# Patient Record
Sex: FEMALE | Race: WHITE | Employment: OTHER | URBAN - METROPOLITAN AREA
[De-identification: names, ages, dates, MRNs, and addresses within clinical notes are randomized per-mention and may not be internally consistent; named-entity substitution may affect disease eponyms.]

---

## 2018-04-06 RX ORDER — LANOLIN ALCOHOL/MO/W.PET/CERES
100 CREAM (GRAM) TOPICAL DAILY
Qty: 30 TABLET | Refills: 5 | Status: SHIPPED | OUTPATIENT
Start: 2018-04-06 | End: 2018-10-08 | Stop reason: SDUPTHER

## 2018-04-06 RX ORDER — BUPROPION HYDROCHLORIDE 150 MG/1
150 TABLET ORAL EVERY MORNING
Qty: 30 TABLET | Refills: 5 | Status: SHIPPED | OUTPATIENT
Start: 2018-04-06 | End: 2018-06-04 | Stop reason: SDUPTHER

## 2018-06-04 ENCOUNTER — OFFICE VISIT (OUTPATIENT)
Dept: FAMILY MEDICINE CLINIC | Age: 58
End: 2018-06-04
Payer: COMMERCIAL

## 2018-06-04 ENCOUNTER — HOSPITAL ENCOUNTER (OUTPATIENT)
Age: 58
Discharge: HOME | End: 2018-06-06
Payer: COMMERCIAL

## 2018-06-04 VITALS
DIASTOLIC BLOOD PRESSURE: 82 MMHG | TEMPERATURE: 98.3 F | OXYGEN SATURATION: 98 % | SYSTOLIC BLOOD PRESSURE: 124 MMHG | WEIGHT: 123 LBS | BODY MASS INDEX: 21.11 KG/M2 | HEART RATE: 98 BPM

## 2018-06-04 DIAGNOSIS — J44.9 CHRONIC OBSTRUCTIVE PULMONARY DISEASE, UNSPECIFIED COPD TYPE (HCC): Primary | ICD-10-CM

## 2018-06-04 DIAGNOSIS — K21.9 GASTROESOPHAGEAL REFLUX DISEASE WITHOUT ESOPHAGITIS: ICD-10-CM

## 2018-06-04 DIAGNOSIS — D75.89 MACROCYTOSIS: ICD-10-CM

## 2018-06-04 DIAGNOSIS — F17.200 TOBACCO DEPENDENCE: ICD-10-CM

## 2018-06-04 DIAGNOSIS — F10.20 ALCOHOLISM (HCC): ICD-10-CM

## 2018-06-04 DIAGNOSIS — D12.6 ADENOMATOUS POLYP OF COLON, UNSPECIFIED PART OF COLON: ICD-10-CM

## 2018-06-04 DIAGNOSIS — F33.0 MILD EPISODE OF RECURRENT MAJOR DEPRESSIVE DISORDER (HCC): ICD-10-CM

## 2018-06-04 LAB
ALBUMIN SERPL-MCNC: 4.8 G/DL (ref 3.5–5.2)
ALP BLD-CCNC: 55 U/L (ref 35–104)
ALT SERPL-CCNC: 44 U/L (ref 0–32)
ANION GAP SERPL CALCULATED.3IONS-SCNC: 18 MMOL/L (ref 7–16)
AST SERPL-CCNC: 54 U/L (ref 0–31)
BASOPHILS ABSOLUTE: 0.05 E9/L (ref 0–0.2)
BASOPHILS RELATIVE PERCENT: 0.8 % (ref 0–2)
BILIRUB SERPL-MCNC: 0.4 MG/DL (ref 0–1.2)
BUN BLDV-MCNC: 6 MG/DL (ref 6–20)
CALCIUM SERPL-MCNC: 9.7 MG/DL (ref 8.6–10.2)
CHLORIDE BLD-SCNC: 98 MMOL/L (ref 98–107)
CO2: 21 MMOL/L (ref 22–29)
CREAT SERPL-MCNC: 0.6 MG/DL (ref 0.5–1)
EOSINOPHILS ABSOLUTE: 0.07 E9/L (ref 0.05–0.5)
EOSINOPHILS RELATIVE PERCENT: 1.2 % (ref 0–6)
FOLATE: 9.5 NG/ML (ref 4.8–24.2)
GFR AFRICAN AMERICAN: >60
GFR NON-AFRICAN AMERICAN: >60 ML/MIN/1.73
GLUCOSE BLD-MCNC: 68 MG/DL (ref 74–109)
HCT VFR BLD CALC: 44.9 % (ref 34–48)
HEMOGLOBIN: 15.3 G/DL (ref 11.5–15.5)
IMMATURE GRANULOCYTES #: 0.01 E9/L
IMMATURE GRANULOCYTES %: 0.2 % (ref 0–5)
LYMPHOCYTES ABSOLUTE: 2.31 E9/L (ref 1.5–4)
LYMPHOCYTES RELATIVE PERCENT: 38.6 % (ref 20–42)
MCH RBC QN AUTO: 34.2 PG (ref 26–35)
MCHC RBC AUTO-ENTMCNC: 34.1 % (ref 32–34.5)
MCV RBC AUTO: 100.2 FL (ref 80–99.9)
MONOCYTES ABSOLUTE: 0.69 E9/L (ref 0.1–0.95)
MONOCYTES RELATIVE PERCENT: 11.5 % (ref 2–12)
NEUTROPHILS ABSOLUTE: 2.86 E9/L (ref 1.8–7.3)
NEUTROPHILS RELATIVE PERCENT: 47.7 % (ref 43–80)
PDW BLD-RTO: 13.6 FL (ref 11.5–15)
PLATELET # BLD: 253 E9/L (ref 130–450)
PMV BLD AUTO: 10 FL (ref 7–12)
POTASSIUM SERPL-SCNC: 4.3 MMOL/L (ref 3.5–5)
RBC # BLD: 4.48 E12/L (ref 3.5–5.5)
SODIUM BLD-SCNC: 137 MMOL/L (ref 132–146)
TOTAL PROTEIN: 7.5 G/DL (ref 6.4–8.3)
VITAMIN B-12: 276 PG/ML (ref 211–946)
WBC # BLD: 6 E9/L (ref 4.5–11.5)

## 2018-06-04 PROCEDURE — G8926 SPIRO NO PERF OR DOC: HCPCS | Performed by: FAMILY MEDICINE

## 2018-06-04 PROCEDURE — 36415 COLL VENOUS BLD VENIPUNCTURE: CPT | Performed by: FAMILY MEDICINE

## 2018-06-04 PROCEDURE — 82746 ASSAY OF FOLIC ACID SERUM: CPT

## 2018-06-04 PROCEDURE — 3014F SCREEN MAMMO DOC REV: CPT | Performed by: FAMILY MEDICINE

## 2018-06-04 PROCEDURE — 3023F SPIROM DOC REV: CPT | Performed by: FAMILY MEDICINE

## 2018-06-04 PROCEDURE — 4004F PT TOBACCO SCREEN RCVD TLK: CPT | Performed by: FAMILY MEDICINE

## 2018-06-04 PROCEDURE — G8420 CALC BMI NORM PARAMETERS: HCPCS | Performed by: FAMILY MEDICINE

## 2018-06-04 PROCEDURE — 3017F COLORECTAL CA SCREEN DOC REV: CPT | Performed by: FAMILY MEDICINE

## 2018-06-04 PROCEDURE — G8427 DOCREV CUR MEDS BY ELIG CLIN: HCPCS | Performed by: FAMILY MEDICINE

## 2018-06-04 PROCEDURE — 80053 COMPREHEN METABOLIC PANEL: CPT

## 2018-06-04 PROCEDURE — 99214 OFFICE O/P EST MOD 30 MIN: CPT | Performed by: FAMILY MEDICINE

## 2018-06-04 PROCEDURE — 85025 COMPLETE CBC W/AUTO DIFF WBC: CPT

## 2018-06-04 PROCEDURE — 82607 VITAMIN B-12: CPT

## 2018-06-04 RX ORDER — OMEPRAZOLE 40 MG/1
40 CAPSULE, DELAYED RELEASE ORAL DAILY
Qty: 30 CAPSULE | Refills: 3 | Status: SHIPPED | OUTPATIENT
Start: 2018-06-04 | End: 2018-10-08 | Stop reason: SDUPTHER

## 2018-06-04 RX ORDER — BUPROPION HYDROCHLORIDE 300 MG/1
300 TABLET ORAL EVERY MORNING
Qty: 30 TABLET | Refills: 3 | Status: SHIPPED | OUTPATIENT
Start: 2018-06-04 | End: 2018-10-08 | Stop reason: SDUPTHER

## 2018-06-06 DIAGNOSIS — I10 ESSENTIAL HYPERTENSION: ICD-10-CM

## 2018-06-06 RX ORDER — LISINOPRIL 10 MG/1
10 TABLET ORAL DAILY
Qty: 30 TABLET | Refills: 5 | Status: SHIPPED | OUTPATIENT
Start: 2018-06-06 | End: 2018-10-19 | Stop reason: SDUPTHER

## 2018-06-29 ENCOUNTER — TELEPHONE (OUTPATIENT)
Dept: FAMILY MEDICINE CLINIC | Age: 58
End: 2018-06-29

## 2018-07-20 ENCOUNTER — OFFICE VISIT (OUTPATIENT)
Dept: FAMILY MEDICINE CLINIC | Age: 58
End: 2018-07-20
Payer: COMMERCIAL

## 2018-07-20 VITALS
WEIGHT: 120 LBS | OXYGEN SATURATION: 96 % | DIASTOLIC BLOOD PRESSURE: 88 MMHG | HEART RATE: 104 BPM | BODY MASS INDEX: 20.49 KG/M2 | TEMPERATURE: 97.6 F | SYSTOLIC BLOOD PRESSURE: 132 MMHG | HEIGHT: 64 IN

## 2018-07-20 DIAGNOSIS — I10 ESSENTIAL HYPERTENSION: ICD-10-CM

## 2018-07-20 DIAGNOSIS — F33.1 MODERATE EPISODE OF RECURRENT MAJOR DEPRESSIVE DISORDER (HCC): Primary | ICD-10-CM

## 2018-07-20 DIAGNOSIS — F10.20 ALCOHOLISM (HCC): ICD-10-CM

## 2018-07-20 DIAGNOSIS — K29.20 CHRONIC ALCOHOLIC GASTRITIS, PRESENCE OF BLEEDING UNSPECIFIED: ICD-10-CM

## 2018-07-20 DIAGNOSIS — Z23 NEED FOR SHINGLES VACCINE: ICD-10-CM

## 2018-07-20 PROCEDURE — 99214 OFFICE O/P EST MOD 30 MIN: CPT | Performed by: FAMILY MEDICINE

## 2018-07-20 PROCEDURE — G8420 CALC BMI NORM PARAMETERS: HCPCS | Performed by: FAMILY MEDICINE

## 2018-07-20 PROCEDURE — G8427 DOCREV CUR MEDS BY ELIG CLIN: HCPCS | Performed by: FAMILY MEDICINE

## 2018-07-20 PROCEDURE — 4004F PT TOBACCO SCREEN RCVD TLK: CPT | Performed by: FAMILY MEDICINE

## 2018-07-20 PROCEDURE — 3017F COLORECTAL CA SCREEN DOC REV: CPT | Performed by: FAMILY MEDICINE

## 2018-07-20 PROCEDURE — 90750 HZV VACC RECOMBINANT IM: CPT | Performed by: FAMILY MEDICINE

## 2018-07-20 PROCEDURE — 90471 IMMUNIZATION ADMIN: CPT | Performed by: FAMILY MEDICINE

## 2018-07-20 PROCEDURE — 3014F SCREEN MAMMO DOC REV: CPT | Performed by: FAMILY MEDICINE

## 2018-07-20 RX ORDER — PAROXETINE 10 MG/1
10 TABLET, FILM COATED ORAL EVERY MORNING
Qty: 30 TABLET | Refills: 3 | Status: SHIPPED | OUTPATIENT
Start: 2018-07-20 | End: 2018-10-19 | Stop reason: SDUPTHER

## 2018-07-20 NOTE — PROGRESS NOTES
[facility]  Office Progress Note - Dr. Yomi Salas  7/20/18    CC:   Chief Complaint   Patient presents with    Discuss Medications        S: Follow-up Depression and gastritis     Depression  We increased her Wellbutrin to 300 mg daily at her last appointment. She tried it for 4 days but it felt too strong, so she went back to the 150mg. She reports that her mood has been fair since going back to the 150mg but still with aily depressive symptoms. Did good with Paxil and Zoloft historically. She has continued to drink alcohol regularly. 8-15 daily probably. Beer. Gastritis  Increased proton pump inhibitor at last appointment. Patient continues chronic alcohol consumption. She reports that her symptoms are improved. \"Actually I really haven't had much problem. \"  No bowel changes,  N, V. Hypertension  Follow-up  Blood pressure is not controlled today. IMproved on recheck though. Took med recently. BP Readings from Last 3 Encounters:   07/20/18 132/88   06/04/18 124/82   03/02/18 (!) 134/94     Patient continues medications regularly. Compliance is good. Denies CP, sob, abd pain, headaches, vision changes, dizziness, hypotensive symptoms. No side effects from medications noted.       Past Medical History:   Diagnosis Date    Abdominal pain 8-19-15    for EGD/Colonoscopy       Family History   Problem Relation Age of Onset    Cancer Mother     High Blood Pressure Father     Arthritis Father     Cancer Father        Past Surgical History:   Procedure Laterality Date    APPENDECTOMY  1995    COLONOSCOPY  8/19/15    ENDOSCOPY, COLON, DIAGNOSTIC  8/19/15       Social History   Substance Use Topics    Smoking status: Current Every Day Smoker     Packs/day: 0.75     Years: 45.00     Types: Cigarettes    Smokeless tobacco: Never Used    Alcohol use 42.0 oz/week     70 Cans of beer per week      Comment: 8-12 cans daily       ROS:  No CP, No palpitations,   No sob, No cough,   No abd pain, No heartburn,   No headaches,   No tingling, No numbness, No weakness,   No bowel changes, No hematochezia, No melena,  No bladder changes, No hematuria  No skin rashes, No skin lesions. No vision changes, No hearing changes,   No polyuria, polydipsia, polyphagia. Depressed mood. ROS otherwise negative unless as listed in HPI. Chart reviewed and updated where appropriate for PMH, Fam, and Soc Hx. Physical Exam   /88   Pulse 104   Temp 97.6 °F (36.4 °C) (Oral)   Ht 5' 4\" (1.626 m)   Wt 120 lb (54.4 kg)   SpO2 96%   BMI 20.60 kg/m²   Wt Readings from Last 3 Encounters:   07/20/18 120 lb (54.4 kg)   06/04/18 123 lb (55.8 kg)   03/02/18 122 lb (55.3 kg)       Constitutional:    She is oriented to person, place, and time. She appears well-developed and well-nourished. HENT:    Nose: Nose normal.    Mouth/Throat: Oropharynx is clear and moist.   Eyes:    Conjunctivae are normal.    Pupils are equal, round, and reactive to light. EOMI. Neck:    Normal range of motion. No thyromegaly or nodules noted. No bruit. Cardiovascular:    Normal rate, regular rhythm and normal heart sounds. No murmur. No gallop and no friction rub. Pulmonary/Chest:    Effort normal and breath sounds normal.    No wheezes. No rales or rhonchi. Abdominal:    Soft. Thin. Bowel sounds are normal.    No distension. No tenderness. Musculoskeletal:    Normal range of motion. No joint swelling noted. No peripheral edema. Skin:    Skin is warm and dry. No rashes, lesions. Psychiatric:    She has a Depressed mood and normal to tearful affect. Normal groom and dress.     Current Outpatient Prescriptions on File Prior to Visit   Medication Sig Dispense Refill    lisinopril (PRINIVIL;ZESTRIL) 10 MG tablet Take 1 tablet by mouth daily 30 tablet 5    omeprazole (PRILOSEC) 40 MG delayed release capsule Take 1 capsule by mouth Daily 30 capsule 3    buPROPion (WELLBUTRIN XL) 300 MG may persist.

## 2018-07-21 PROBLEM — I10 ESSENTIAL HYPERTENSION: Status: ACTIVE | Noted: 2018-07-21

## 2018-07-26 ENCOUNTER — OFFICE VISIT (OUTPATIENT)
Dept: FAMILY MEDICINE CLINIC | Age: 58
End: 2018-07-26
Payer: COMMERCIAL

## 2018-07-26 VITALS
DIASTOLIC BLOOD PRESSURE: 71 MMHG | HEART RATE: 96 BPM | HEIGHT: 64 IN | OXYGEN SATURATION: 98 % | BODY MASS INDEX: 21.51 KG/M2 | SYSTOLIC BLOOD PRESSURE: 125 MMHG | TEMPERATURE: 98.5 F | WEIGHT: 126 LBS | RESPIRATION RATE: 12 BRPM

## 2018-07-26 DIAGNOSIS — H61.22 HEARING LOSS OF LEFT EAR DUE TO CERUMEN IMPACTION: Primary | ICD-10-CM

## 2018-07-26 PROCEDURE — 3014F SCREEN MAMMO DOC REV: CPT | Performed by: PHYSICIAN ASSISTANT

## 2018-07-26 PROCEDURE — G8420 CALC BMI NORM PARAMETERS: HCPCS | Performed by: PHYSICIAN ASSISTANT

## 2018-07-26 PROCEDURE — 4004F PT TOBACCO SCREEN RCVD TLK: CPT | Performed by: PHYSICIAN ASSISTANT

## 2018-07-26 PROCEDURE — 99213 OFFICE O/P EST LOW 20 MIN: CPT | Performed by: PHYSICIAN ASSISTANT

## 2018-07-26 PROCEDURE — 3017F COLORECTAL CA SCREEN DOC REV: CPT | Performed by: PHYSICIAN ASSISTANT

## 2018-07-26 PROCEDURE — G8427 DOCREV CUR MEDS BY ELIG CLIN: HCPCS | Performed by: PHYSICIAN ASSISTANT

## 2018-07-26 NOTE — PROGRESS NOTES
External Canals: Left TM not visible d/t impacted cerumen. Ear canal without swelling or exudate. Right TM translucent. Nose:  No congestion of the nasal mucosa. Throat:  Posterior pharynx without injection, exudate, or tonsillar hypertrophy. Airway patient. Neck:  Normal ROM. Supple. No adenopathy. Respiratory:  CTAB without wheezing, rales, or rhonchi  CV: Regular rate and rhythm, normal heart sounds, without pathological murmurs, ectopy, gallops, or rubs. Skin:  Moist and warm without rashes or lesions. Lymphatic: No lymphangitis or adenopathy noted. Neurological:  Oriented. Motor functions intact. Lab / Imaging Results   (All laboratory and radiology results have been personally reviewed by myself)  Labs:  No results found for this visit on 07/26/18. Assessment / Plan     Impression(s):  1. Hearing loss of left ear due to cerumen impaction      Disposition:  Disposition: Discharge to home. Ear lavage performed in office, symptoms resolved post-lavage. Left TM translucent without erythema or perforation. Avoid Q-tip use to prevent recurrence. F/u PCP in 5-7 days if symptoms recur. ED sooner if symptoms worsen or change. ED immediately with fever, severe or worsening ear pain, CP, dyspnea, or dysphagia. Pt is in agreement with this care plan. All questions answered.

## 2018-10-02 ENCOUNTER — OFFICE VISIT (OUTPATIENT)
Dept: FAMILY MEDICINE CLINIC | Age: 58
End: 2018-10-02
Payer: COMMERCIAL

## 2018-10-02 VITALS
BODY MASS INDEX: 22.14 KG/M2 | WEIGHT: 129 LBS | SYSTOLIC BLOOD PRESSURE: 128 MMHG | DIASTOLIC BLOOD PRESSURE: 68 MMHG | TEMPERATURE: 97.8 F | OXYGEN SATURATION: 92 % | HEART RATE: 88 BPM

## 2018-10-02 DIAGNOSIS — F33.1 MODERATE EPISODE OF RECURRENT MAJOR DEPRESSIVE DISORDER (HCC): ICD-10-CM

## 2018-10-02 DIAGNOSIS — I10 ESSENTIAL HYPERTENSION: ICD-10-CM

## 2018-10-02 DIAGNOSIS — F10.20 ALCOHOLISM (HCC): Primary | ICD-10-CM

## 2018-10-02 PROCEDURE — 4004F PT TOBACCO SCREEN RCVD TLK: CPT | Performed by: FAMILY MEDICINE

## 2018-10-02 PROCEDURE — 3014F SCREEN MAMMO DOC REV: CPT | Performed by: FAMILY MEDICINE

## 2018-10-02 PROCEDURE — G8420 CALC BMI NORM PARAMETERS: HCPCS | Performed by: FAMILY MEDICINE

## 2018-10-02 PROCEDURE — G8427 DOCREV CUR MEDS BY ELIG CLIN: HCPCS | Performed by: FAMILY MEDICINE

## 2018-10-02 PROCEDURE — G8484 FLU IMMUNIZE NO ADMIN: HCPCS | Performed by: FAMILY MEDICINE

## 2018-10-02 PROCEDURE — 99214 OFFICE O/P EST MOD 30 MIN: CPT | Performed by: FAMILY MEDICINE

## 2018-10-02 PROCEDURE — 3017F COLORECTAL CA SCREEN DOC REV: CPT | Performed by: FAMILY MEDICINE

## 2018-10-08 ENCOUNTER — TELEPHONE (OUTPATIENT)
Dept: FAMILY MEDICINE CLINIC | Age: 58
End: 2018-10-08

## 2018-10-08 DIAGNOSIS — F33.0 MILD EPISODE OF RECURRENT MAJOR DEPRESSIVE DISORDER (HCC): ICD-10-CM

## 2018-10-08 DIAGNOSIS — K21.9 GASTROESOPHAGEAL REFLUX DISEASE WITHOUT ESOPHAGITIS: ICD-10-CM

## 2018-10-08 RX ORDER — OMEPRAZOLE 40 MG/1
40 CAPSULE, DELAYED RELEASE ORAL DAILY
Qty: 30 CAPSULE | Refills: 3 | Status: SHIPPED | OUTPATIENT
Start: 2018-10-08 | End: 2018-10-19 | Stop reason: SDUPTHER

## 2018-10-08 RX ORDER — LANOLIN ALCOHOL/MO/W.PET/CERES
100 CREAM (GRAM) TOPICAL DAILY
Qty: 30 TABLET | Refills: 5 | Status: SHIPPED | OUTPATIENT
Start: 2018-10-08 | End: 2018-10-19 | Stop reason: SDUPTHER

## 2018-10-08 RX ORDER — BUPROPION HYDROCHLORIDE 300 MG/1
300 TABLET ORAL EVERY MORNING
Qty: 30 TABLET | Refills: 3 | Status: SHIPPED | OUTPATIENT
Start: 2018-10-08 | End: 2018-10-19 | Stop reason: SDUPTHER

## 2018-10-08 NOTE — TELEPHONE ENCOUNTER
----- Message from Dionte Carver MD sent at 10/6/2018 12:45 PM EDT -----  Please check with patient and see if she decided to schedule an appointment with a counseling center. If she did not, offer for us to do that for her.

## 2018-10-08 NOTE — PROGRESS NOTES
Left message for pt- has she scheduled with counseling center? Or if she needs number or help scheduling she is to call us back.

## 2018-10-10 NOTE — TELEPHONE ENCOUNTER
Patient called back and left a message, returned her call and got her VM left another message asking for a return call to the office.

## 2018-10-19 ENCOUNTER — TELEPHONE (OUTPATIENT)
Dept: FAMILY MEDICINE CLINIC | Age: 58
End: 2018-10-19

## 2018-10-19 DIAGNOSIS — F33.0 MILD EPISODE OF RECURRENT MAJOR DEPRESSIVE DISORDER (HCC): ICD-10-CM

## 2018-10-19 DIAGNOSIS — K21.9 GASTROESOPHAGEAL REFLUX DISEASE WITHOUT ESOPHAGITIS: ICD-10-CM

## 2018-10-19 DIAGNOSIS — I10 ESSENTIAL HYPERTENSION: ICD-10-CM

## 2018-10-19 DIAGNOSIS — F33.1 MODERATE EPISODE OF RECURRENT MAJOR DEPRESSIVE DISORDER (HCC): ICD-10-CM

## 2018-10-19 NOTE — TELEPHONE ENCOUNTER
Per doctors request, left voicemail for patient to call back at her earliest convenience, notified her that we received a few prescription refill request forms from [address] [place] [address] via fax, and that doctor wanted to know if patient initiated switching pharmacies.     (Forms in [email or web address].)

## 2018-10-22 RX ORDER — LISINOPRIL 10 MG/1
10 TABLET ORAL DAILY
Qty: 90 TABLET | Refills: 0 | Status: SHIPPED | OUTPATIENT
Start: 2018-10-22 | End: 2019-01-29 | Stop reason: SDUPTHER

## 2018-10-22 RX ORDER — LANOLIN ALCOHOL/MO/W.PET/CERES
100 CREAM (GRAM) TOPICAL DAILY
Qty: 90 TABLET | Refills: 0 | Status: SHIPPED | OUTPATIENT
Start: 2018-10-22 | End: 2019-05-30 | Stop reason: SDUPTHER

## 2018-10-22 RX ORDER — OMEPRAZOLE 40 MG/1
40 CAPSULE, DELAYED RELEASE ORAL DAILY
Qty: 90 CAPSULE | Refills: 0 | Status: SHIPPED | OUTPATIENT
Start: 2018-10-22 | End: 2019-04-02 | Stop reason: SDUPTHER

## 2018-10-22 RX ORDER — BUPROPION HYDROCHLORIDE 150 MG/1
150 TABLET ORAL EVERY MORNING
Qty: 90 TABLET | Refills: 0 | Status: SHIPPED | OUTPATIENT
Start: 2018-10-22 | End: 2019-01-29 | Stop reason: SDUPTHER

## 2018-10-22 RX ORDER — ALBUTEROL SULFATE 90 UG/1
2 AEROSOL, METERED RESPIRATORY (INHALATION) EVERY 6 HOURS PRN
Qty: 3 INHALER | Refills: 0 | Status: SHIPPED | OUTPATIENT
Start: 2018-10-22 | End: 2019-04-02 | Stop reason: SDUPTHER

## 2018-10-22 RX ORDER — PAROXETINE 10 MG/1
10 TABLET, FILM COATED ORAL EVERY MORNING
Qty: 90 TABLET | Refills: 0 | Status: SHIPPED | OUTPATIENT
Start: 2018-10-22 | End: 2018-12-26 | Stop reason: SDUPTHER

## 2018-11-27 ENCOUNTER — TELEPHONE (OUTPATIENT)
Dept: FAMILY MEDICINE CLINIC | Age: 58
End: 2018-11-27

## 2018-12-26 DIAGNOSIS — F33.1 MODERATE EPISODE OF RECURRENT MAJOR DEPRESSIVE DISORDER (HCC): ICD-10-CM

## 2018-12-27 RX ORDER — PAROXETINE 10 MG/1
10 TABLET, FILM COATED ORAL EVERY MORNING
Qty: 90 TABLET | Refills: 0 | Status: SHIPPED | OUTPATIENT
Start: 2018-12-27 | End: 2019-04-02 | Stop reason: DRUGHIGH

## 2019-01-02 ENCOUNTER — OFFICE VISIT (OUTPATIENT)
Dept: FAMILY MEDICINE CLINIC | Age: 59
End: 2019-01-02
Payer: COMMERCIAL

## 2019-01-02 ENCOUNTER — HOSPITAL ENCOUNTER (OUTPATIENT)
Age: 59
Discharge: HOME | End: 2019-01-04
Payer: COMMERCIAL

## 2019-01-02 VITALS
RESPIRATION RATE: 14 BRPM | DIASTOLIC BLOOD PRESSURE: 70 MMHG | HEART RATE: 105 BPM | SYSTOLIC BLOOD PRESSURE: 130 MMHG | TEMPERATURE: 98.4 F | OXYGEN SATURATION: 95 % | WEIGHT: 129 LBS | BODY MASS INDEX: 22.86 KG/M2 | HEIGHT: 63 IN

## 2019-01-02 DIAGNOSIS — J44.9 CHRONIC OBSTRUCTIVE PULMONARY DISEASE, UNSPECIFIED COPD TYPE (HCC): ICD-10-CM

## 2019-01-02 DIAGNOSIS — R14.0 BLOATING: Primary | ICD-10-CM

## 2019-01-02 DIAGNOSIS — F10.20 ALCOHOLISM (HCC): ICD-10-CM

## 2019-01-02 DIAGNOSIS — D36.9 TUBULAR ADENOMA: ICD-10-CM

## 2019-01-02 LAB
ALBUMIN SERPL-MCNC: 4.9 G/DL (ref 3.5–5.2)
ALP BLD-CCNC: 64 U/L (ref 35–104)
ALT SERPL-CCNC: 44 U/L (ref 0–32)
ANION GAP SERPL CALCULATED.3IONS-SCNC: 19 MMOL/L (ref 7–16)
AST SERPL-CCNC: 65 U/L (ref 0–31)
BASOPHILS ABSOLUTE: 0.05 E9/L (ref 0–0.2)
BASOPHILS RELATIVE PERCENT: 0.7 % (ref 0–2)
BILIRUB SERPL-MCNC: 0.3 MG/DL (ref 0–1.2)
BUN BLDV-MCNC: 8 MG/DL (ref 6–20)
CALCIUM SERPL-MCNC: 10.1 MG/DL (ref 8.6–10.2)
CHLORIDE BLD-SCNC: 95 MMOL/L (ref 98–107)
CO2: 20 MMOL/L (ref 22–29)
CREAT SERPL-MCNC: 0.6 MG/DL (ref 0.5–1)
EOSINOPHILS ABSOLUTE: 0.14 E9/L (ref 0.05–0.5)
EOSINOPHILS RELATIVE PERCENT: 1.9 % (ref 0–6)
FOLATE: 8.8 NG/ML (ref 4.8–24.2)
GFR AFRICAN AMERICAN: >60
GFR NON-AFRICAN AMERICAN: >60 ML/MIN/1.73
GLUCOSE BLD-MCNC: 66 MG/DL (ref 74–99)
HCT VFR BLD CALC: 46.7 % (ref 34–48)
HEMOGLOBIN: 16 G/DL (ref 11.5–15.5)
IMMATURE GRANULOCYTES #: 0.02 E9/L
IMMATURE GRANULOCYTES %: 0.3 % (ref 0–5)
LYMPHOCYTES ABSOLUTE: 2.4 E9/L (ref 1.5–4)
LYMPHOCYTES RELATIVE PERCENT: 32.9 % (ref 20–42)
MCH RBC QN AUTO: 34.2 PG (ref 26–35)
MCHC RBC AUTO-ENTMCNC: 34.3 % (ref 32–34.5)
MCV RBC AUTO: 99.8 FL (ref 80–99.9)
MONOCYTES ABSOLUTE: 0.61 E9/L (ref 0.1–0.95)
MONOCYTES RELATIVE PERCENT: 8.4 % (ref 2–12)
NEUTROPHILS ABSOLUTE: 4.08 E9/L (ref 1.8–7.3)
NEUTROPHILS RELATIVE PERCENT: 55.8 % (ref 43–80)
PDW BLD-RTO: 13 FL (ref 11.5–15)
PLATELET # BLD: 209 E9/L (ref 130–450)
PMV BLD AUTO: 11.7 FL (ref 7–12)
POTASSIUM SERPL-SCNC: 4.7 MMOL/L (ref 3.5–5)
RBC # BLD: 4.68 E12/L (ref 3.5–5.5)
SODIUM BLD-SCNC: 134 MMOL/L (ref 132–146)
TOTAL PROTEIN: 8.3 G/DL (ref 6.4–8.3)
VITAMIN B-12: 297 PG/ML (ref 211–946)
WBC # BLD: 7.3 E9/L (ref 4.5–11.5)

## 2019-01-02 PROCEDURE — 36415 COLL VENOUS BLD VENIPUNCTURE: CPT | Performed by: FAMILY MEDICINE

## 2019-01-02 PROCEDURE — 3014F SCREEN MAMMO DOC REV: CPT | Performed by: FAMILY MEDICINE

## 2019-01-02 PROCEDURE — 99214 OFFICE O/P EST MOD 30 MIN: CPT | Performed by: FAMILY MEDICINE

## 2019-01-02 PROCEDURE — G8420 CALC BMI NORM PARAMETERS: HCPCS | Performed by: FAMILY MEDICINE

## 2019-01-02 PROCEDURE — 3023F SPIROM DOC REV: CPT | Performed by: FAMILY MEDICINE

## 2019-01-02 PROCEDURE — 85025 COMPLETE CBC W/AUTO DIFF WBC: CPT

## 2019-01-02 PROCEDURE — 82746 ASSAY OF FOLIC ACID SERUM: CPT

## 2019-01-02 PROCEDURE — 3017F COLORECTAL CA SCREEN DOC REV: CPT | Performed by: FAMILY MEDICINE

## 2019-01-02 PROCEDURE — G8427 DOCREV CUR MEDS BY ELIG CLIN: HCPCS | Performed by: FAMILY MEDICINE

## 2019-01-02 PROCEDURE — G8482 FLU IMMUNIZE ORDER/ADMIN: HCPCS | Performed by: FAMILY MEDICINE

## 2019-01-02 PROCEDURE — 82607 VITAMIN B-12: CPT

## 2019-01-02 PROCEDURE — 80053 COMPREHEN METABOLIC PANEL: CPT

## 2019-01-02 PROCEDURE — G8926 SPIRO NO PERF OR DOC: HCPCS | Performed by: FAMILY MEDICINE

## 2019-01-02 PROCEDURE — 4004F PT TOBACCO SCREEN RCVD TLK: CPT | Performed by: FAMILY MEDICINE

## 2019-01-03 ENCOUNTER — TELEPHONE (OUTPATIENT)
Dept: FAMILY MEDICINE CLINIC | Age: 59
End: 2019-01-03

## 2019-01-03 PROCEDURE — 90686 IIV4 VACC NO PRSV 0.5 ML IM: CPT | Performed by: FAMILY MEDICINE

## 2019-01-03 PROCEDURE — 90471 IMMUNIZATION ADMIN: CPT | Performed by: FAMILY MEDICINE

## 2019-01-10 ENCOUNTER — TELEPHONE (OUTPATIENT)
Dept: FAMILY MEDICINE CLINIC | Age: 59
End: 2019-01-10

## 2019-01-10 ENCOUNTER — TELEPHONE (OUTPATIENT)
Dept: SURGERY | Age: 59
End: 2019-01-10

## 2019-01-17 ENCOUNTER — OFFICE VISIT (OUTPATIENT)
Dept: SURGERY | Age: 59
End: 2019-01-17
Payer: COMMERCIAL

## 2019-01-17 ENCOUNTER — PREP FOR PROCEDURE (OUTPATIENT)
Dept: SURGERY | Age: 59
End: 2019-01-17

## 2019-01-17 VITALS
OXYGEN SATURATION: 97 % | HEART RATE: 98 BPM | DIASTOLIC BLOOD PRESSURE: 93 MMHG | SYSTOLIC BLOOD PRESSURE: 137 MMHG | RESPIRATION RATE: 16 BRPM | BODY MASS INDEX: 23.92 KG/M2 | TEMPERATURE: 98 F | HEIGHT: 63 IN | WEIGHT: 135 LBS

## 2019-01-17 DIAGNOSIS — D12.6 ADENOMATOUS POLYP OF COLON, UNSPECIFIED PART OF COLON: ICD-10-CM

## 2019-01-17 DIAGNOSIS — R10.13 EPIGASTRIC PAIN: Primary | ICD-10-CM

## 2019-01-17 PROCEDURE — G8420 CALC BMI NORM PARAMETERS: HCPCS | Performed by: SURGERY

## 2019-01-17 PROCEDURE — 99243 OFF/OP CNSLTJ NEW/EST LOW 30: CPT | Performed by: SURGERY

## 2019-01-17 PROCEDURE — G8482 FLU IMMUNIZE ORDER/ADMIN: HCPCS | Performed by: SURGERY

## 2019-01-17 PROCEDURE — 3017F COLORECTAL CA SCREEN DOC REV: CPT | Performed by: SURGERY

## 2019-01-17 PROCEDURE — 3014F SCREEN MAMMO DOC REV: CPT | Performed by: SURGERY

## 2019-01-17 PROCEDURE — G8427 DOCREV CUR MEDS BY ELIG CLIN: HCPCS | Performed by: SURGERY

## 2019-01-17 RX ORDER — 0.9 % SODIUM CHLORIDE 0.9 %
10 VIAL (ML) INJECTION EVERY 12 HOURS SCHEDULED
Status: CANCELLED | OUTPATIENT
Start: 2019-01-17

## 2019-01-17 RX ORDER — 0.9 % SODIUM CHLORIDE 0.9 %
10 VIAL (ML) INJECTION PRN
Status: CANCELLED | OUTPATIENT
Start: 2019-01-17

## 2019-01-29 DIAGNOSIS — F33.0 MILD EPISODE OF RECURRENT MAJOR DEPRESSIVE DISORDER (HCC): ICD-10-CM

## 2019-01-29 DIAGNOSIS — I10 ESSENTIAL HYPERTENSION: ICD-10-CM

## 2019-01-29 RX ORDER — LISINOPRIL 10 MG/1
10 TABLET ORAL DAILY
Qty: 90 TABLET | Refills: 3 | Status: SHIPPED | OUTPATIENT
Start: 2019-01-29 | End: 2020-01-21

## 2019-01-29 RX ORDER — BUPROPION HYDROCHLORIDE 150 MG/1
150 TABLET ORAL EVERY MORNING
Qty: 90 TABLET | Refills: 3 | Status: SHIPPED | OUTPATIENT
Start: 2019-01-29 | End: 2020-01-02

## 2019-04-02 ENCOUNTER — OFFICE VISIT (OUTPATIENT)
Dept: FAMILY MEDICINE CLINIC | Age: 59
End: 2019-04-02
Payer: COMMERCIAL

## 2019-04-02 VITALS
WEIGHT: 129 LBS | BODY MASS INDEX: 22.02 KG/M2 | HEIGHT: 64 IN | TEMPERATURE: 97.8 F | OXYGEN SATURATION: 99 % | SYSTOLIC BLOOD PRESSURE: 100 MMHG | DIASTOLIC BLOOD PRESSURE: 70 MMHG | HEART RATE: 52 BPM

## 2019-04-02 DIAGNOSIS — F33.1 MODERATE EPISODE OF RECURRENT MAJOR DEPRESSIVE DISORDER (HCC): ICD-10-CM

## 2019-04-02 DIAGNOSIS — K21.9 GASTROESOPHAGEAL REFLUX DISEASE WITHOUT ESOPHAGITIS: ICD-10-CM

## 2019-04-02 DIAGNOSIS — F10.20 ALCOHOLISM (HCC): Primary | ICD-10-CM

## 2019-04-02 PROCEDURE — 3017F COLORECTAL CA SCREEN DOC REV: CPT | Performed by: FAMILY MEDICINE

## 2019-04-02 PROCEDURE — G8420 CALC BMI NORM PARAMETERS: HCPCS | Performed by: FAMILY MEDICINE

## 2019-04-02 PROCEDURE — G8427 DOCREV CUR MEDS BY ELIG CLIN: HCPCS | Performed by: FAMILY MEDICINE

## 2019-04-02 PROCEDURE — 3014F SCREEN MAMMO DOC REV: CPT | Performed by: FAMILY MEDICINE

## 2019-04-02 PROCEDURE — 4004F PT TOBACCO SCREEN RCVD TLK: CPT | Performed by: FAMILY MEDICINE

## 2019-04-02 PROCEDURE — 99214 OFFICE O/P EST MOD 30 MIN: CPT | Performed by: FAMILY MEDICINE

## 2019-04-02 RX ORDER — OMEPRAZOLE 20 MG/1
20 CAPSULE, DELAYED RELEASE ORAL DAILY
Qty: 90 CAPSULE | Refills: 0 | Status: SHIPPED | OUTPATIENT
Start: 2019-04-02 | End: 2019-07-05 | Stop reason: SDUPTHER

## 2019-04-02 RX ORDER — UMECLIDINIUM 62.5 UG/1
AEROSOL, POWDER ORAL
Qty: 90 EACH | Refills: 3 | Status: SHIPPED
Start: 2019-04-02 | End: 2020-02-14

## 2019-04-02 RX ORDER — PAROXETINE 10 MG/1
5 TABLET, FILM COATED ORAL EVERY MORNING
Qty: 7 TABLET | Refills: 0 | Status: SHIPPED
Start: 2019-04-02 | End: 2019-10-02

## 2019-04-02 RX ORDER — OMEPRAZOLE 40 MG/1
40 CAPSULE, DELAYED RELEASE ORAL DAILY
Qty: 90 CAPSULE | Refills: 10 | OUTPATIENT
Start: 2019-04-02

## 2019-04-02 RX ORDER — ALBUTEROL SULFATE 90 UG/1
2 AEROSOL, METERED RESPIRATORY (INHALATION) EVERY 6 HOURS PRN
Qty: 54 G | Refills: 3 | Status: SHIPPED
Start: 2019-04-02 | End: 2020-04-01 | Stop reason: SDUPTHER

## 2019-04-02 NOTE — PROGRESS NOTES
[facility]  Office Progress Note - Dr. Dominique Pickens  4/2/19    CC:   Chief Complaint   Patient presents with    Gastroesophageal Reflux        S: GERD  stomac has been great lately  She has bene cuttting back on drinking a little bit and been eating better foods like salads and better nutrition instead of fast food. No N/V. No melena or bowel changes. Continues high dose PPI. Mood  She has been feeling pretty decent recently  Interested in getting rid of a medication. Would like to stop the paxil. COntinue wellbutrin. Has bene on it long term. No SI or HI. Strained relationships with children still. Alcoholism. Alcoholism. She is drunk right now. Usually is every day at this point. She remains interested in detox program, but doesn't want to do much beyond the initial detox. l  When asked about how she will maintain sobriety after returning home, she doesn't have that insight right now. Discussed this as a reason to transition to a program first to maintain sobriety and then return home. She is worried about finances while she is gone. Wants to work on putting some money back for this over next couple months and then go to detox program.     Past Medical History:   Diagnosis Date    Abdominal pain 8-19-15    for EGD/Colonoscopy       Family History   Problem Relation Age of Onset    Cancer Mother     High Blood Pressure Father     Arthritis Father     Cancer Father        Past Surgical History:   Procedure Laterality Date    APPENDECTOMY  1995    COLONOSCOPY  8/19/15    ENDOSCOPY, COLON, DIAGNOSTIC  8/19/15       Social History     Tobacco Use    Smoking status: Current Every Day Smoker     Packs/day: 0.75     Years: 45.00     Pack years: 33.75     Types: Cigarettes    Smokeless tobacco: Never Used   Substance Use Topics    Alcohol use:  Yes     Alcohol/week: 42.0 oz     Types: 70 Cans of beer per week     Comment: 8-12 cans daily    Drug use: No       ROS:  No CP, (PRINIVIL;ZESTRIL) 10 MG tablet TAKE 1 TABLET BY MOUTH DAILY 90 tablet 3    thiamine 100 MG tablet Take 1 tablet by mouth daily 90 tablet 0     No current facility-administered medications on file prior to visit. Patient Active Problem List   Diagnosis Code    Epigastric abdominal pain R10.13    Alcoholism ([facility] F10.20    Gastroesophageal reflux disease without esophagitis K21.9    Polyp of colon, adenomatous D12.6    Chronic obstructive pulmonary disease ([facility] J44.9    Depression F32.9    Essential hypertension I10        Assessment / Plan  Kehinde Nava was seen today for gastroesophageal reflux. Diagnoses and all orders for this visit:    Alcoholism [facility])  Not making progress, but contemplative. Does admit to decreasing number of beers daily slightly. Still motivated to go to detox but finances of lost work while she is there remain the major barrier. She is going to save some money for a couple months and see if she is able to go then. Remains motivated. Gastroesophageal reflux disease without esophagitis  - Decrease dose. omeprazole (PRILOSEC) 20 MG delayed release capsule; Take 1 capsule by mouth Daily For upset stomach . Moderate episode of recurrent major depressive disorder ([facility]  -  Wean and stop. PARoxetine (PAXIL) 10 MG tablet; Take 0.5 tablets by mouth every morning For a week and then stop. Continue wellbutrin. Return in about 2 months (around 6/2/2019). Patient counseled to follow up sooner or seek more acute care if symptoms worsening. Electronically signed by Tim Hoang MD on 4/7/2019    This note may have been created using dictation software.  Efforts were made to reduce grammatical or syntax errors, but some may persist.

## 2019-04-30 DIAGNOSIS — F33.1 MODERATE EPISODE OF RECURRENT MAJOR DEPRESSIVE DISORDER (HCC): ICD-10-CM

## 2019-04-30 RX ORDER — PAROXETINE 10 MG/1
TABLET, FILM COATED ORAL
Qty: 4 TABLET | Refills: 10 | OUTPATIENT
Start: 2019-04-30

## 2019-05-15 DIAGNOSIS — F33.1 MODERATE EPISODE OF RECURRENT MAJOR DEPRESSIVE DISORDER (HCC): ICD-10-CM

## 2019-05-15 RX ORDER — PAROXETINE 10 MG/1
TABLET, FILM COATED ORAL
Qty: 4 TABLET | Refills: 0 | OUTPATIENT
Start: 2019-05-15

## 2019-07-05 DIAGNOSIS — K21.9 GASTROESOPHAGEAL REFLUX DISEASE WITHOUT ESOPHAGITIS: ICD-10-CM

## 2019-07-08 RX ORDER — OMEPRAZOLE 20 MG/1
20 CAPSULE, DELAYED RELEASE ORAL DAILY
Qty: 90 CAPSULE | Refills: 3 | Status: SHIPPED
Start: 2019-07-08 | End: 2020-06-18

## 2019-10-02 ENCOUNTER — HOSPITAL ENCOUNTER (OUTPATIENT)
Age: 59
Discharge: HOME | End: 2019-10-04
Payer: COMMERCIAL

## 2019-10-02 ENCOUNTER — OFFICE VISIT (OUTPATIENT)
Dept: FAMILY MEDICINE CLINIC | Age: 59
End: 2019-10-02
Payer: COMMERCIAL

## 2019-10-02 VITALS
HEIGHT: 64 IN | DIASTOLIC BLOOD PRESSURE: 90 MMHG | SYSTOLIC BLOOD PRESSURE: 140 MMHG | WEIGHT: 128 LBS | OXYGEN SATURATION: 95 % | TEMPERATURE: 98 F | HEART RATE: 95 BPM | BODY MASS INDEX: 21.85 KG/M2

## 2019-10-02 DIAGNOSIS — I10 ESSENTIAL HYPERTENSION: Primary | ICD-10-CM

## 2019-10-02 DIAGNOSIS — K21.9 GASTROESOPHAGEAL REFLUX DISEASE WITHOUT ESOPHAGITIS: ICD-10-CM

## 2019-10-02 DIAGNOSIS — I10 ESSENTIAL HYPERTENSION: ICD-10-CM

## 2019-10-02 DIAGNOSIS — Z12.31 ENCOUNTER FOR MAMMOGRAM TO ESTABLISH BASELINE MAMMOGRAM: ICD-10-CM

## 2019-10-02 DIAGNOSIS — F17.210 CIGARETTE NICOTINE DEPENDENCE WITHOUT COMPLICATION: ICD-10-CM

## 2019-10-02 DIAGNOSIS — J44.9 CHRONIC OBSTRUCTIVE PULMONARY DISEASE, UNSPECIFIED COPD TYPE (HCC): ICD-10-CM

## 2019-10-02 DIAGNOSIS — Z12.11 COLON CANCER SCREENING: ICD-10-CM

## 2019-10-02 LAB
ALBUMIN SERPL-MCNC: 4.9 G/DL (ref 3.5–5.2)
ALP BLD-CCNC: 68 U/L (ref 35–104)
ALT SERPL-CCNC: 63 U/L (ref 0–32)
ANION GAP SERPL CALCULATED.3IONS-SCNC: 17 MMOL/L (ref 7–16)
AST SERPL-CCNC: 63 U/L (ref 0–31)
BASOPHILS ABSOLUTE: 0.07 E9/L (ref 0–0.2)
BASOPHILS RELATIVE PERCENT: 1.1 % (ref 0–2)
BILIRUB SERPL-MCNC: 0.4 MG/DL (ref 0–1.2)
BUN BLDV-MCNC: 7 MG/DL (ref 6–20)
CALCIUM SERPL-MCNC: 10.3 MG/DL (ref 8.6–10.2)
CHLORIDE BLD-SCNC: 99 MMOL/L (ref 98–107)
CHOLESTEROL, TOTAL: 232 MG/DL (ref 0–199)
CO2: 22 MMOL/L (ref 22–29)
CREAT SERPL-MCNC: 0.6 MG/DL (ref 0.5–1)
EOSINOPHILS ABSOLUTE: 0.12 E9/L (ref 0.05–0.5)
EOSINOPHILS RELATIVE PERCENT: 1.8 % (ref 0–6)
GFR AFRICAN AMERICAN: >60
GFR NON-AFRICAN AMERICAN: >60 ML/MIN/1.73
GLUCOSE BLD-MCNC: 90 MG/DL (ref 74–99)
HCT VFR BLD CALC: 48 % (ref 34–48)
HDLC SERPL-MCNC: 101 MG/DL
HEMOGLOBIN: 16.8 G/DL (ref 11.5–15.5)
IMMATURE GRANULOCYTES #: 0.03 E9/L
IMMATURE GRANULOCYTES %: 0.5 % (ref 0–5)
LDL CHOLESTEROL CALCULATED: 112 MG/DL (ref 0–99)
LYMPHOCYTES ABSOLUTE: 1.81 E9/L (ref 1.5–4)
LYMPHOCYTES RELATIVE PERCENT: 27.4 % (ref 20–42)
MCH RBC QN AUTO: 34.9 PG (ref 26–35)
MCHC RBC AUTO-ENTMCNC: 35 % (ref 32–34.5)
MCV RBC AUTO: 99.6 FL (ref 80–99.9)
MONOCYTES ABSOLUTE: 0.78 E9/L (ref 0.1–0.95)
MONOCYTES RELATIVE PERCENT: 11.8 % (ref 2–12)
NEUTROPHILS ABSOLUTE: 3.8 E9/L (ref 1.8–7.3)
NEUTROPHILS RELATIVE PERCENT: 57.4 % (ref 43–80)
PDW BLD-RTO: 14.2 FL (ref 11.5–15)
PLATELET # BLD: 274 E9/L (ref 130–450)
PMV BLD AUTO: 10.2 FL (ref 7–12)
POTASSIUM SERPL-SCNC: 4.7 MMOL/L (ref 3.5–5)
RBC # BLD: 4.82 E12/L (ref 3.5–5.5)
SODIUM BLD-SCNC: 138 MMOL/L (ref 132–146)
TOTAL PROTEIN: 8.2 G/DL (ref 6.4–8.3)
TRIGL SERPL-MCNC: 96 MG/DL (ref 0–149)
VLDLC SERPL CALC-MCNC: 19 MG/DL
WBC # BLD: 6.6 E9/L (ref 4.5–11.5)

## 2019-10-02 PROCEDURE — 80053 COMPREHEN METABOLIC PANEL: CPT

## 2019-10-02 PROCEDURE — 4004F PT TOBACCO SCREEN RCVD TLK: CPT | Performed by: FAMILY MEDICINE

## 2019-10-02 PROCEDURE — 99214 OFFICE O/P EST MOD 30 MIN: CPT | Performed by: FAMILY MEDICINE

## 2019-10-02 PROCEDURE — 90686 IIV4 VACC NO PRSV 0.5 ML IM: CPT | Performed by: FAMILY MEDICINE

## 2019-10-02 PROCEDURE — G8926 SPIRO NO PERF OR DOC: HCPCS | Performed by: FAMILY MEDICINE

## 2019-10-02 PROCEDURE — 36415 COLL VENOUS BLD VENIPUNCTURE: CPT

## 2019-10-02 PROCEDURE — 3017F COLORECTAL CA SCREEN DOC REV: CPT | Performed by: FAMILY MEDICINE

## 2019-10-02 PROCEDURE — G8482 FLU IMMUNIZE ORDER/ADMIN: HCPCS | Performed by: FAMILY MEDICINE

## 2019-10-02 PROCEDURE — 3023F SPIROM DOC REV: CPT | Performed by: FAMILY MEDICINE

## 2019-10-02 PROCEDURE — 3014F SCREEN MAMMO DOC REV: CPT | Performed by: FAMILY MEDICINE

## 2019-10-02 PROCEDURE — G8420 CALC BMI NORM PARAMETERS: HCPCS | Performed by: FAMILY MEDICINE

## 2019-10-02 PROCEDURE — G8427 DOCREV CUR MEDS BY ELIG CLIN: HCPCS | Performed by: FAMILY MEDICINE

## 2019-10-02 PROCEDURE — 80061 LIPID PANEL: CPT

## 2019-10-02 PROCEDURE — 90471 IMMUNIZATION ADMIN: CPT | Performed by: FAMILY MEDICINE

## 2019-10-02 PROCEDURE — 85025 COMPLETE CBC W/AUTO DIFF WBC: CPT

## 2019-10-02 RX ORDER — NICOTINE 21 MG/24HR
1 PATCH, TRANSDERMAL 24 HOURS TRANSDERMAL EVERY 24 HOURS
Qty: 30 PATCH | Refills: 1 | Status: SHIPPED | OUTPATIENT
Start: 2019-10-02 | End: 2019-10-31 | Stop reason: SDUPTHER

## 2019-12-06 DIAGNOSIS — Z12.11 COLON CANCER SCREENING: ICD-10-CM

## 2019-12-06 LAB
CONTROL: NORMAL
HEMOCCULT STL QL: NORMAL

## 2019-12-06 PROCEDURE — 82274 ASSAY TEST FOR BLOOD FECAL: CPT | Performed by: FAMILY MEDICINE

## 2020-01-02 ENCOUNTER — OFFICE VISIT (OUTPATIENT)
Dept: FAMILY MEDICINE CLINIC | Age: 60
End: 2020-01-02
Payer: COMMERCIAL

## 2020-01-02 VITALS
HEIGHT: 64 IN | HEART RATE: 111 BPM | DIASTOLIC BLOOD PRESSURE: 90 MMHG | TEMPERATURE: 98.3 F | SYSTOLIC BLOOD PRESSURE: 140 MMHG | OXYGEN SATURATION: 94 % | BODY MASS INDEX: 23.05 KG/M2 | WEIGHT: 135 LBS

## 2020-01-02 PROBLEM — F10.20 ALCOHOL USE DISORDER, MODERATE, DEPENDENCE (HCC): Status: ACTIVE | Noted: 2020-01-02

## 2020-01-02 PROCEDURE — G8482 FLU IMMUNIZE ORDER/ADMIN: HCPCS | Performed by: FAMILY MEDICINE

## 2020-01-02 PROCEDURE — G8427 DOCREV CUR MEDS BY ELIG CLIN: HCPCS | Performed by: FAMILY MEDICINE

## 2020-01-02 PROCEDURE — G8420 CALC BMI NORM PARAMETERS: HCPCS | Performed by: FAMILY MEDICINE

## 2020-01-02 PROCEDURE — 3017F COLORECTAL CA SCREEN DOC REV: CPT | Performed by: FAMILY MEDICINE

## 2020-01-02 PROCEDURE — 3023F SPIROM DOC REV: CPT | Performed by: FAMILY MEDICINE

## 2020-01-02 PROCEDURE — 4004F PT TOBACCO SCREEN RCVD TLK: CPT | Performed by: FAMILY MEDICINE

## 2020-01-02 PROCEDURE — 99214 OFFICE O/P EST MOD 30 MIN: CPT | Performed by: FAMILY MEDICINE

## 2020-01-02 PROCEDURE — G8926 SPIRO NO PERF OR DOC: HCPCS | Performed by: FAMILY MEDICINE

## 2020-01-02 PROCEDURE — G0296 VISIT TO DETERM LDCT ELIG: HCPCS | Performed by: FAMILY MEDICINE

## 2020-01-02 RX ORDER — BUPROPION HYDROCHLORIDE 300 MG/1
300 TABLET ORAL EVERY MORNING
Qty: 30 TABLET | Refills: 2 | Status: SHIPPED
Start: 2020-01-02 | End: 2020-02-12

## 2020-01-02 NOTE — PROGRESS NOTES
[facility]  Office Progress Note - Dr. Arnav Causey  1/2/20    CC:   Chief Complaint   Patient presents with    Hypertension        S:   Hypertension  Follow-up  She has not yet taken her blood pressure pill today. Blood pressure is not quite controlled today. BP Readings from Last 3 Encounters:   01/02/20 (!) 140/90   10/02/19 (!) 140/90   04/02/19 100/70     Patient continues medications regularly. Compliance is good. Denies CP, sob, abd pain, headaches, vision changes, dizziness, hypotensive symptoms. No side effects from medications noted. COPD  Feels that SOB has persisted since last appointment. With exertion, makes it hard to work. Nicotine patches not helping much   Chantix worked but mood side effects. Some coughing. Some sputum production. Continues Incruse inhaler as well as albuterol. GERD  Has been on and off symptomatic recently. Gets some bloating after meals. She continues taking omeprazole 20 mg daily. She is not having reflux. Abdominal pain is decreased. Alcohol use is decreased. Alcohol use disorder  Improving  Patient has cut back significantly on consumption. She was previously doing 8 to 15 cans of beer daily, now she is probably down to 6 to 8 cans of beer daily. Changes came because she has wanted to work on this, and she has been taking care of her grandson more regularly, which has been enjoyable to her and given her some purpose. She says that she drove to her appointment today, which is probably the first time that she has ever been able to do this. She has not had anything to drink yet. She does feel mildly anxious, she does have mild tremulousness. Nicotine dependence  Patient continues smoking. She would qualify for CT lung cancer screening and is interested in that. She took Chantix historically, but it caused mood side effects. Nicotine patches have not helped much and she is currently using them.   She continues round, and reactive to light. EOMI. Neck:    Normal range of motion. No thyromegaly or nodules noted. No bruit. No adenopathy  Cardiovascular:    Normal rate, regular rhythm and normal heart sounds. No murmur. No gallop and no friction rub. Pulmonary/Chest:    Effort normal and breath sounds normal.    No wheezes. No rales or rhonchi. Abdominal:    Soft. Thin. Bowel sounds are normal.    No distension. Mild epigastric tenderness. Musculoskeletal:    Normal range of motion. No joint swelling noted. No peripheral edema. Neurological:    She is alert and oriented to person, place, and time. Motor and sensation grossly intact. Normal Gait. Slight shakiness. Skin:    Skin is warm and dry. No rashes, lesions. Psychiatric:    She has a sometimes anxious mood and affect. Normal groom and dress. Current Outpatient Medications on File Prior to Visit   Medication Sig Dispense Refill    nicotine (NICODERM CQ) 21 MG/24HR APPLY 1 PATCH TOPICALLY TO THE SKIN EVERY 24 HOURS 30 patch 0    omeprazole (PRILOSEC) 20 MG delayed release capsule TAKE 1 CAPSULE BY MOUTH DAILY FOR UPSET STOMACH 90 capsule 3    Thiamine HCl (B-1) 100 MG TABS TAKE 1 TABLET BY MOUTH DAILY 90 tablet 3    albuterol sulfate  (90 Base) MCG/ACT inhaler INHALE 2 PUFFS INTO THE LUNGS EVERY 6 HOURS AS NEEDED FOR WHEEZING OR SHORTNESS OF BREATH 54 g 3    INCRUSE ELLIPTA 62.5 MCG/INH AEPB INHALE ONE (1) PUFF BY MOUTH DAILY 90 each 3    lisinopril (PRINIVIL;ZESTRIL) 10 MG tablet TAKE 1 TABLET BY MOUTH DAILY 90 tablet 3     No current facility-administered medications on file prior to visit.         Patient Active Problem List   Diagnosis Code    Epigastric abdominal pain R10.13    Gastroesophageal reflux disease without esophagitis K21.9    Polyp of colon, adenomatous D12.6    Chronic obstructive pulmonary disease ([facility] J44.9    Depression F32.9    Essential hypertension I10    Alcohol use disorder, criteria met   Age 50-69   Pack year smoking >30   Still smoking or less than 15 year since quit   No sign or symptoms of lung cancer   > 11 months since last LDCT     Risks and benefits of lung cancer screening with LDCT scans discussed:    Significance of positive screen - False-positive LDCT results often occur. 95% of all positive results do not lead to a diagnosis of cancer. Usually further imaging can resolve most false-positive results; however, some patients may require invasive procedures. Over diagnosis risk - 10% to 12% of screen-detected lung cancer cases are over diagnosed--that is, the cancer would not have been detected in the patient's lifetime without the screening. Need for follow up screens annually to continue lung cancer screening effectiveness     Risks associated with radiation from annual LDCT- Radiation exposure is about the same as for a mammogram, which is about 1/3 of the annual background radiation exposure from everyday life. Starting screening at age 54 is not likely to increase cancer risk from radiation exposure. Patients with comorbidities resulting in life expectancy of < 10 years, or that would preclude treatment of an abnormality identified on CT, should not be screened due to lack of benefit.     To obtain maximal benefit from this screening, smoking cessation and long-term abstinence from smoking is critical

## 2020-01-14 RX ORDER — NICOTINE 21 MG/24HR
PATCH, TRANSDERMAL 24 HOURS TRANSDERMAL
Qty: 30 PATCH | Refills: 0 | Status: CANCELLED | OUTPATIENT
Start: 2020-01-14

## 2020-01-14 NOTE — TELEPHONE ENCOUNTER
Last Appointment:  1/2/2020  Future Appointments   Date Time Provider Glen Alvarado   4/3/2020  9:00 AM Artis Bo  [address]

## 2020-01-16 ENCOUNTER — TELEPHONE (OUTPATIENT)
Dept: FAMILY MEDICINE CLINIC | Age: 60
End: 2020-01-16

## 2020-02-12 NOTE — TELEPHONE ENCOUNTER
Last Appointment:  1/2/2020  Future Appointments   Date Time Provider Glen Alvarado   4/3/2020  9:00 AM Briana Glaser  [address] [facility] [address]

## 2020-02-13 RX ORDER — BUPROPION HYDROCHLORIDE 300 MG/1
300 TABLET ORAL EVERY MORNING
Qty: 30 TABLET | Refills: 5 | Status: SHIPPED
Start: 2020-02-13 | End: 2020-03-13 | Stop reason: SDUPTHER

## 2020-02-24 ENCOUNTER — TELEPHONE (OUTPATIENT)
Dept: FAMILY MEDICINE CLINIC | Age: 60
End: 2020-02-24

## 2020-03-13 RX ORDER — BUPROPION HYDROCHLORIDE 300 MG/1
300 TABLET ORAL EVERY MORNING
Qty: 30 TABLET | Refills: 5 | Status: SHIPPED
Start: 2020-03-13 | End: 2020-09-15

## 2020-04-01 RX ORDER — ALBUTEROL SULFATE 90 UG/1
2 AEROSOL, METERED RESPIRATORY (INHALATION) EVERY 6 HOURS PRN
Qty: 54 G | Refills: 0 | Status: SHIPPED
Start: 2020-04-01 | End: 2020-06-18

## 2020-06-18 RX ORDER — OMEPRAZOLE 20 MG/1
CAPSULE, DELAYED RELEASE ORAL
Qty: 90 CAPSULE | Refills: 3 | Status: SHIPPED
Start: 2020-06-18 | End: 2021-05-19 | Stop reason: ALTCHOICE

## 2020-06-18 RX ORDER — ALBUTEROL SULFATE 90 UG/1
AEROSOL, METERED RESPIRATORY (INHALATION)
Qty: 54 G | Refills: 0 | Status: SHIPPED
Start: 2020-06-18 | End: 2020-09-15

## 2020-07-17 RX ORDER — LISINOPRIL 10 MG/1
TABLET ORAL
Qty: 90 TABLET | Refills: 1 | Status: SHIPPED
Start: 2020-07-17 | End: 2021-01-18

## 2020-08-17 ENCOUNTER — TELEPHONE (OUTPATIENT)
Dept: ADMINISTRATIVE | Age: 60
End: 2020-08-17

## 2020-08-17 ENCOUNTER — NURSE TRIAGE (OUTPATIENT)
Dept: OTHER | Facility: CLINIC | Age: 60
End: 2020-08-17

## 2020-08-17 NOTE — TELEPHONE ENCOUNTER
I sent to call to nurse triage, but had to leave a message for them to call her back. She wants an appt with Dr. Kimmy Gilmore in the office, but she has a whole list of symptoms going on that are on our [facility]\"" list. She has diarrhea, bloating, vomiting, SOB she says from COPD, stomach pains/gas and weight loss. She states this has been going on for 3 months now. Please contact pt with further instructions on what to do?

## 2020-08-17 NOTE — TELEPHONE ENCOUNTER
Spoke with patient and advised [facility] d/t multiple symptoms on Covid list.   Pt understood and said she will go there

## 2020-08-18 ENCOUNTER — HOSPITAL ENCOUNTER (OUTPATIENT)
Age: 60
Discharge: HOME | End: 2020-08-20
Payer: COMMERCIAL

## 2020-08-18 ENCOUNTER — NURSE ONLY (OUTPATIENT)
Dept: PRIMARY CARE CLINIC | Age: 60
End: 2020-08-18
Payer: COMMERCIAL

## 2020-08-18 VITALS
HEIGHT: 64 IN | BODY MASS INDEX: 19.63 KG/M2 | HEART RATE: 113 BPM | WEIGHT: 115 LBS | DIASTOLIC BLOOD PRESSURE: 90 MMHG | TEMPERATURE: 98.3 F | SYSTOLIC BLOOD PRESSURE: 130 MMHG | OXYGEN SATURATION: 96 %

## 2020-08-18 PROBLEM — Z20.822 SUSPECTED COVID-19 VIRUS INFECTION: Status: ACTIVE | Noted: 2020-08-18

## 2020-08-18 PROBLEM — J44.1 ACUTE EXACERBATION OF CHRONIC OBSTRUCTIVE PULMONARY DISEASE (COPD) (HCC): Status: ACTIVE | Noted: 2020-08-18

## 2020-08-18 PROCEDURE — 99212 OFFICE O/P EST SF 10 MIN: CPT | Performed by: CLINICAL NURSE SPECIALIST

## 2020-08-18 PROCEDURE — U0003 INFECTIOUS AGENT DETECTION BY NUCLEIC ACID (DNA OR RNA); SEVERE ACUTE RESPIRATORY SYNDROME CORONAVIRUS 2 (SARS-COV-2) (CORONAVIRUS DISEASE [COVID-19]), AMPLIFIED PROBE TECHNIQUE, MAKING USE OF HIGH THROUGHPUT TECHNOLOGIES AS DESCRIBED BY CMS-2020-01-R: HCPCS

## 2020-08-18 RX ORDER — AZITHROMYCIN 250 MG/1
250 TABLET, FILM COATED ORAL SEE ADMIN INSTRUCTIONS
Qty: 6 TABLET | Refills: 0 | Status: SHIPPED | OUTPATIENT
Start: 2020-08-18 | End: 2020-08-23

## 2020-08-18 ASSESSMENT — ENCOUNTER SYMPTOMS
WHEEZING: 1
VOMITING: 1
RHINORRHEA: 1
EYES NEGATIVE: 1
DIARRHEA: 1
NAUSEA: 1

## 2020-08-18 NOTE — PROGRESS NOTES
clear. Posterior oropharyngeal erythema present. No oropharyngeal exudate. Eyes:      General: No scleral icterus. Right eye: No discharge. Left eye: No discharge. Extraocular Movements: Extraocular movements intact. Conjunctiva/sclera: Conjunctivae normal.      Pupils: Pupils are equal, round, and reactive to light. Neck:      Musculoskeletal: Normal range of motion and neck supple. No neck rigidity or muscular tenderness. Vascular: No carotid bruit. Cardiovascular:      Rate and Rhythm: Normal rate and regular rhythm. Pulses: Normal pulses. Heart sounds: Normal heart sounds. No murmur. No friction rub. No gallop. Pulmonary:      Effort: Pulmonary effort is normal. No respiratory distress. Breath sounds: No stridor. Wheezing present. No rhonchi or rales. Comments: Diminished with wheezing  Chest:      Chest wall: No tenderness. Abdominal:      General: Abdomen is flat. Bowel sounds are normal. There is no distension. Palpations: Abdomen is soft. There is no mass. Tenderness: There is no abdominal tenderness. There is no right CVA tenderness, left CVA tenderness, guarding or rebound. Hernia: No hernia is present. Musculoskeletal: Normal range of motion. General: No swelling, tenderness, deformity or signs of injury. Right lower leg: No edema. Left lower leg: No edema. Lymphadenopathy:      Cervical: No cervical adenopathy. Skin:     General: Skin is warm and dry. Capillary Refill: Capillary refill takes less than 2 seconds. Coloration: Skin is not jaundiced or pale. Findings: No bruising, erythema, lesion or rash. Neurological:      General: No focal deficit present. Mental Status: She is alert and oriented to person, place, and time. Cranial Nerves: No cranial nerve deficit. Sensory: No sensory deficit. Motor: No weakness.       Coordination: Coordination normal.      Gait: Gait normal.      Deep Tendon Reflexes: Reflexes normal.   Psychiatric:         Mood and Affect: Mood normal.         Behavior: Behavior normal.         Thought Content: Thought content normal.         Judgment: Judgment normal.     BP (!) 130/90   Pulse 113   Temp 98.3 °F (36.8 °C)   Ht 5' 4\" (1.626 m)   Wt 115 lb (52.2 kg)   LMP  (LMP Unknown)   SpO2 96%   Breastfeeding No   BMI 19.74 kg/m²       Assessment:       Diagnosis Orders   1. Suspected COVID-19 virus infection  COVID-19 Ambulatory   2. Chronic obstructive pulmonary disease, unspecified COPD type ([facility]     3. Acute exacerbation of chronic obstructive pulmonary disease (COPD) ([facility]             Plan:      Reviewed medication and plan of care. Patient was swabbed for Covid. Advised patient to self isolate until results are available. Suspect exacerbation of copd and will escribe zpack to [facility] in [place]. Encouraged patient to hydrate and rest.  Advised to go to ER if symptoms worsen.         Braulio Daly, APRN - CNP

## 2020-08-20 LAB
SARS-COV-2: NOT DETECTED
SOURCE: NORMAL

## 2020-09-15 RX ORDER — ALBUTEROL SULFATE 90 UG/1
AEROSOL, METERED RESPIRATORY (INHALATION)
Qty: 54 G | Refills: 5 | Status: SHIPPED
Start: 2020-09-15 | End: 2021-03-24 | Stop reason: SDUPTHER

## 2020-09-15 RX ORDER — BUPROPION HYDROCHLORIDE 300 MG/1
300 TABLET ORAL EVERY MORNING
Qty: 30 TABLET | Refills: 5 | Status: SHIPPED
Start: 2020-09-15 | End: 2021-03-17

## 2020-11-10 NOTE — TELEPHONE ENCOUNTER
Kenji calling In she prefers to use spriiva and wants a refill on it. Please disregard incruse ellipta .   She would like refill sent asap

## 2020-11-11 RX ORDER — TIOTROPIUM BROMIDE 18 UG/1
18 CAPSULE ORAL; RESPIRATORY (INHALATION) DAILY
Qty: 30 CAPSULE | Refills: 5 | Status: SHIPPED
Start: 2020-11-11 | End: 2021-03-24 | Stop reason: SDUPTHER

## 2020-12-18 RX ORDER — ADHESIVE BANDAGE 3/4"
BANDAGE TOPICAL
Qty: 15 ML | Refills: 10 | Status: SHIPPED
Start: 2020-12-18 | End: 2021-04-08

## 2021-01-18 DIAGNOSIS — I10 ESSENTIAL HYPERTENSION: ICD-10-CM

## 2021-01-18 RX ORDER — LISINOPRIL 10 MG/1
TABLET ORAL
Qty: 30 TABLET | Refills: 5 | Status: SHIPPED
Start: 2021-01-18 | End: 2021-03-24 | Stop reason: SDUPTHER

## 2021-03-17 DIAGNOSIS — F33.0 MILD EPISODE OF RECURRENT MAJOR DEPRESSIVE DISORDER (HCC): ICD-10-CM

## 2021-03-17 NOTE — TELEPHONE ENCOUNTER
Last Appointment:  1/2/2020  Future Appointments   Date Time Provider Glen Alvarado   3/24/2021  8:20 AM Juliet Uriarte  [address]

## 2021-03-19 RX ORDER — BUPROPION HYDROCHLORIDE 300 MG/1
300 TABLET ORAL EVERY MORNING
Qty: 30 TABLET | Refills: 5 | Status: SHIPPED
Start: 2021-03-19 | End: 2021-05-10

## 2021-03-24 ENCOUNTER — OFFICE VISIT (OUTPATIENT)
Dept: FAMILY MEDICINE CLINIC | Age: 61
End: 2021-03-24
Payer: COMMERCIAL

## 2021-03-24 VITALS
SYSTOLIC BLOOD PRESSURE: 144 MMHG | OXYGEN SATURATION: 95 % | HEART RATE: 114 BPM | BODY MASS INDEX: 20.38 KG/M2 | TEMPERATURE: 97.8 F | HEIGHT: 63 IN | WEIGHT: 115 LBS | DIASTOLIC BLOOD PRESSURE: 90 MMHG

## 2021-03-24 DIAGNOSIS — J44.9 CHRONIC OBSTRUCTIVE PULMONARY DISEASE, UNSPECIFIED COPD TYPE (HCC): ICD-10-CM

## 2021-03-24 DIAGNOSIS — K21.9 GASTROESOPHAGEAL REFLUX DISEASE WITHOUT ESOPHAGITIS: ICD-10-CM

## 2021-03-24 DIAGNOSIS — L65.9 HAIR THINNING: ICD-10-CM

## 2021-03-24 DIAGNOSIS — Z12.31 ENCOUNTER FOR MAMMOGRAM TO ESTABLISH BASELINE MAMMOGRAM: ICD-10-CM

## 2021-03-24 DIAGNOSIS — F10.20 ALCOHOL USE DISORDER, MODERATE, DEPENDENCE (HCC): ICD-10-CM

## 2021-03-24 DIAGNOSIS — Z13.31 POSITIVE DEPRESSION SCREENING: Primary | ICD-10-CM

## 2021-03-24 DIAGNOSIS — I10 ESSENTIAL HYPERTENSION: ICD-10-CM

## 2021-03-24 DIAGNOSIS — Z87.891 PERSONAL HISTORY OF TOBACCO USE: ICD-10-CM

## 2021-03-24 DIAGNOSIS — R39.15 URINARY URGENCY: ICD-10-CM

## 2021-03-24 LAB
ALBUMIN SERPL-MCNC: 5.2 G/DL (ref 3.5–5.2)
ALP BLD-CCNC: 89 U/L (ref 35–104)
ALT SERPL-CCNC: 38 U/L (ref 0–32)
ANION GAP SERPL CALCULATED.3IONS-SCNC: 16 MMOL/L (ref 7–16)
AST SERPL-CCNC: 46 U/L (ref 0–31)
BASOPHILS ABSOLUTE: 0.06 E9/L (ref 0–0.2)
BASOPHILS RELATIVE PERCENT: 0.8 % (ref 0–2)
BILIRUB SERPL-MCNC: 0.6 MG/DL (ref 0–1.2)
BUN BLDV-MCNC: 4 MG/DL (ref 8–23)
CALCIUM SERPL-MCNC: 9.7 MG/DL (ref 8.6–10.2)
CHLORIDE BLD-SCNC: 92 MMOL/L (ref 98–107)
CHOLESTEROL, TOTAL: 193 MG/DL (ref 0–199)
CO2: 23 MMOL/L (ref 22–29)
CREAT SERPL-MCNC: 0.5 MG/DL (ref 0.5–1)
EOSINOPHILS ABSOLUTE: 0.08 E9/L (ref 0.05–0.5)
EOSINOPHILS RELATIVE PERCENT: 1.1 % (ref 0–6)
FERRITIN: 369 NG/ML
FOLATE: 17.4 NG/ML (ref 4.8–24.2)
GFR AFRICAN AMERICAN: >60
GFR NON-AFRICAN AMERICAN: >60 ML/MIN/1.73
GLUCOSE BLD-MCNC: 90 MG/DL (ref 74–99)
HCT VFR BLD CALC: 49.6 % (ref 34–48)
HDLC SERPL-MCNC: 66 MG/DL
HEMOGLOBIN: 17.1 G/DL (ref 11.5–15.5)
IMMATURE GRANULOCYTES #: 0.03 E9/L
IMMATURE GRANULOCYTES %: 0.4 % (ref 0–5)
LDL CHOLESTEROL CALCULATED: 110 MG/DL (ref 0–99)
LIPASE: 29 U/L (ref 13–60)
LYMPHOCYTES ABSOLUTE: 1.65 E9/L (ref 1.5–4)
LYMPHOCYTES RELATIVE PERCENT: 23.1 % (ref 20–42)
MCH RBC QN AUTO: 34.1 PG (ref 26–35)
MCHC RBC AUTO-ENTMCNC: 34.5 % (ref 32–34.5)
MCV RBC AUTO: 99 FL (ref 80–99.9)
MONOCYTES ABSOLUTE: 0.61 E9/L (ref 0.1–0.95)
MONOCYTES RELATIVE PERCENT: 8.6 % (ref 2–12)
NEUTROPHILS ABSOLUTE: 4.7 E9/L (ref 1.8–7.3)
NEUTROPHILS RELATIVE PERCENT: 66 % (ref 43–80)
PDW BLD-RTO: 13 FL (ref 11.5–15)
PLATELET # BLD: 252 E9/L (ref 130–450)
PMV BLD AUTO: 10.1 FL (ref 7–12)
POTASSIUM SERPL-SCNC: 4.2 MMOL/L (ref 3.5–5)
RBC # BLD: 5.01 E12/L (ref 3.5–5.5)
SODIUM BLD-SCNC: 131 MMOL/L (ref 132–146)
TOTAL PROTEIN: 7.9 G/DL (ref 6.4–8.3)
TRIGL SERPL-MCNC: 87 MG/DL (ref 0–149)
TSH SERPL DL<=0.05 MIU/L-ACNC: 1.89 UIU/ML (ref 0.27–4.2)
VITAMIN B-12: 464 PG/ML (ref 211–946)
VLDLC SERPL CALC-MCNC: 17 MG/DL
WBC # BLD: 7.1 E9/L (ref 4.5–11.5)

## 2021-03-24 PROCEDURE — G0296 VISIT TO DETERM LDCT ELIG: HCPCS | Performed by: FAMILY MEDICINE

## 2021-03-24 PROCEDURE — 3023F SPIROM DOC REV: CPT | Performed by: FAMILY MEDICINE

## 2021-03-24 PROCEDURE — G8431 POS CLIN DEPRES SCRN F/U DOC: HCPCS | Performed by: FAMILY MEDICINE

## 2021-03-24 PROCEDURE — G8420 CALC BMI NORM PARAMETERS: HCPCS | Performed by: FAMILY MEDICINE

## 2021-03-24 PROCEDURE — G8427 DOCREV CUR MEDS BY ELIG CLIN: HCPCS | Performed by: FAMILY MEDICINE

## 2021-03-24 PROCEDURE — G8926 SPIRO NO PERF OR DOC: HCPCS | Performed by: FAMILY MEDICINE

## 2021-03-24 PROCEDURE — 4004F PT TOBACCO SCREEN RCVD TLK: CPT | Performed by: FAMILY MEDICINE

## 2021-03-24 PROCEDURE — 99215 OFFICE O/P EST HI 40 MIN: CPT | Performed by: FAMILY MEDICINE

## 2021-03-24 PROCEDURE — G8484 FLU IMMUNIZE NO ADMIN: HCPCS | Performed by: FAMILY MEDICINE

## 2021-03-24 PROCEDURE — 3017F COLORECTAL CA SCREEN DOC REV: CPT | Performed by: FAMILY MEDICINE

## 2021-03-24 RX ORDER — LISINOPRIL 10 MG/1
TABLET ORAL
Qty: 30 TABLET | Refills: 5 | Status: SHIPPED
Start: 2021-03-24 | End: 2021-07-12 | Stop reason: SDUPTHER

## 2021-03-24 RX ORDER — ALBUTEROL SULFATE 90 UG/1
AEROSOL, METERED RESPIRATORY (INHALATION)
Qty: 54 G | Refills: 5 | Status: SHIPPED
Start: 2021-03-24 | End: 2021-09-28

## 2021-03-24 RX ORDER — METHION/INOS/CHOL BT/B COM/LIV 110MG-86MG
CAPSULE ORAL
Qty: 90 TABLET | Refills: 3 | Status: SHIPPED
Start: 2021-03-24 | End: 2021-05-10 | Stop reason: SDUPTHER

## 2021-03-24 RX ORDER — OXYBUTYNIN CHLORIDE 5 MG/1
5 TABLET, EXTENDED RELEASE ORAL DAILY
Qty: 30 TABLET | Refills: 3 | Status: SHIPPED
Start: 2021-03-24 | End: 2021-07-12

## 2021-03-24 RX ORDER — TIOTROPIUM BROMIDE 18 UG/1
18 CAPSULE ORAL; RESPIRATORY (INHALATION) DAILY
Qty: 30 CAPSULE | Refills: 5 | Status: SHIPPED
Start: 2021-03-24 | End: 2021-11-19

## 2021-03-24 ASSESSMENT — PATIENT HEALTH QUESTIONNAIRE - PHQ9
1. LITTLE INTEREST OR PLEASURE IN DOING THINGS: 3
SUM OF ALL RESPONSES TO PHQ QUESTIONS 1-9: 18
2. FEELING DOWN, DEPRESSED OR HOPELESS: 3
10. IF YOU CHECKED OFF ANY PROBLEMS, HOW DIFFICULT HAVE THESE PROBLEMS MADE IT FOR YOU TO DO YOUR WORK, TAKE CARE OF THINGS AT HOME, OR GET ALONG WITH OTHER PEOPLE: 1
7. TROUBLE CONCENTRATING ON THINGS, SUCH AS READING THE NEWSPAPER OR WATCHING TELEVISION: 3
SUM OF ALL RESPONSES TO PHQ9 QUESTIONS 1 & 2: 6
9. THOUGHTS THAT YOU WOULD BE BETTER OFF DEAD, OR OF HURTING YOURSELF: 0
8. MOVING OR SPEAKING SO SLOWLY THAT OTHER PEOPLE COULD HAVE NOTICED. OR THE OPPOSITE, BEING SO FIGETY OR RESTLESS THAT YOU HAVE BEEN MOVING AROUND A LOT MORE THAN USUAL: 0

## 2021-03-24 NOTE — PROGRESS NOTES
from initial SBIRT screening. We will recheck in a couple weeks. Gastroesophageal reflux disease without esophagitis  -     CBC Auto Differential; Future  -     Comprehensive Metabolic Panel; Future  Continues PPI. Worsening abdominal complaints. Poor dietary intake. Major alcohol intake. Considering chronic pancreatitis. Will check lipase and may get CT abdomen to further assess. Exocrine pancreatic insufficiency is not unreasonable in that light and we will need to explore further. Chronic obstructive pulmonary disease, unspecified COPD type (HCC)  -     albuterol sulfate  (90 Base) MCG/ACT inhaler; NHALE 2 PUFFS BY MOUTH INTO THE LUNGS EVERY 6 HOURS AS NEEDED FOR WHEEZING OR SHORTNESS OF BREATH  -     tiotropium (SPIRIVA HANDIHALER) 18 MCG inhalation capsule; Inhale 1 capsule into the lungs daily  Worsening. Add Advair. Continue Spiriva, continue albuterol. Counseled to stop smoking and that this is the only thing which will slow down the progression of this problem. Alcohol use disorder, moderate, dependence (HCC)  -     Thiamine HCl (B-1) 100 MG TABS; TAKE 1 TABLET BY MOUTH DAILY  -     Vitamin B12 & Folate; Future  -     VITAMIN B1; Future  -     VITAMIN B6; Future  -     LIPASE; Future  Continue thiamine supplementation. Hair thinning  -     TSH without Reflex; Future  -     Ferritin; Future    Urinary urgency  Start oxybutynin    Personal history of tobacco use  -     [place] VISIT TO DISCUSS LUNG CA SCREEN W LDCT  -     CT Lung Screen (Annual); Future  She would like to continue annual lung cancer screening as she does have a family history. She continues smoking and meets the qualifications. Shared decision making. Encounter for mammogram to establish baseline mammogram  -     [facility] [place] DIGITAL SCREEN BILATERAL; Future    Other orders  -Start   fluticasone-salmeterol (ADVAIR DISKUS) 250-50 MCG/DOSE AEPB; Inhale 1 puff into the lungs 2 times daily Rinse mouth after use.   -   Start oxybutynin (DITROPAN XL) 5 MG extended release tablet; Take 1 tablet by mouth daily For bladder spasms      2 to 3 weeks or sooner as needed or as scheduled. Patient counseled to follow up sooner or seek more acute care if symptoms worsening or not improving according to plan. Electronically signed by Roshni Land MD on 3/24/2021  Please note that >40 minutes was spent face-to-face with patient gathering history, performing physical exam, discussing findings, counseling patient, and determining plan forward, documenting, coordinating care. All questions addressed and answered. Patient had not been in quite some time due to Covid. We addressed many concerns today, started new medications, worked up new problems. We will revisit some of this in 2 weeks. _________________________________________________________  Current Outpatient Medications on File Prior to Visit   Medication Sig Dispense Refill    buPROPion (WELLBUTRIN XL) 300 MG extended release tablet TAKE 1 TABLET BY MOUTH EVERY MORNING (Patient not taking: Reported on 3/24/2021) 30 tablet 5    EAR DROPS 6.5 % otic solution PLACE 5 DROPS INTO LEFT EAR AS NEEDED FOR EARWAX (Patient not taking: Reported on 3/24/2021) 15 mL 10    omeprazole (PRILOSEC) 20 MG delayed release capsule TAKE (1) CAPSULE BY MOUTH DAILY FOR UPSET STOMACH (Patient not taking: Reported on 8/18/2020) 90 capsule 3    nicotine (NICODERM CQ) 21 MG/24HR APPLY 1 PATCH TOPICALLY TO THE SKIN EVERY 24 HOURS (Patient not taking: Reported on 8/18/2020) 30 patch 0     No current facility-administered medications on file prior to visit.         Patient Active Problem List   Diagnosis Code    Epigastric abdominal pain R10.13    Gastroesophageal reflux disease without esophagitis K21.9    Polyp of colon, adenomatous D12.6    Chronic obstructive pulmonary disease ([facility] [address] J44.9    Depression F32.9    Essential hypertension I10    Alcohol use disorder, moderate, dependence ([facility] [address] F10.20    Suspected COVID-19 virus infection Z20.822    Acute exacerbation of chronic obstructive pulmonary disease (COPD) ([facility]) J44.1     _________________________________________________________  Past Medical History:   Diagnosis Date    Abdominal pain 8-19-15    for EGD/Colonoscopy       Family History   Problem Relation Age of Onset    Cancer Mother     High Blood Pressure Father     Arthritis Father     Cancer Father        Past Surgical History:   Procedure Laterality Date    APPENDECTOMY  1995    COLONOSCOPY  8/19/15    ENDOSCOPY, COLON, DIAGNOSTIC  8/19/15       Social History     Tobacco Use    Smoking status: Current Every Day Smoker     Packs/day: 0.75     Years: 45.00     Pack years: 33.75     Types: Cigarettes    Smokeless tobacco: Never Used   Substance Use Topics    Alcohol use: Yes     Alcohol/week: 70.0 standard drinks     Types: 70 Cans of beer per week     Comment: 8-12 cans daily    Drug use: No       Chart reviewed and updated where appropriate for PMH, Fam, and Soc Hx.  _________________________________________________________  ROS: POSITIVE: As in the HPI. Otherwise Pertinent negatives are negative.    __________________________________________________________  Physical Exam   BP (!) 144/90   Pulse 114   Temp 97.8 °F (36.6 °C)   Ht 5' 2.5\" (1.588 m)   Wt 115 lb (52.2 kg)   LMP  (LMP Unknown)   SpO2 95%   BMI 20.70 kg/m²   Wt Readings from Last 3 Encounters:   03/24/21 115 lb (52.2 kg)   08/18/20 115 lb (52.2 kg)   01/02/20 135 lb (61.2 kg)       Constitutional:    She is oriented to person, place, and time. She appears well-developed and well-nourished. Eyes:    Conjunctivae are normal.    Pupils are equal, round, and reactive to light. EOMI. Neck:    Normal range of motion. No thyromegaly or nodules noted. No bruit. No adenopathy  Cardiovascular:    Normal rate, regular rhythm and normal heart sounds. No murmur. No gallop and no friction rub. Pulmonary/Chest:    Effort increased and breath sounds decreased. Scattered wheezes. No rales or rhonchi. Abdominal:    Soft. Thin. Bowel sounds are normal.    No distension. No tenderness. Musculoskeletal:    Normal range of motion. No joint swelling noted. No peripheral edema. Skin:    Skin is warm and dry. No rashes, No lesions. Psychiatric:    She has a anxious mood and affect. Normal groom and dress. No SI or HI.   ________________________________________________________    This note may have been created using dictation software. Efforts were made to reduce grammatical or syntax errors, but some may persist.   Low Dose CT (LDCT) Lung Screening criteria met   Age 50-69   Pack year smoking >30   Still smoking or less than 15 year since quit   No sign or symptoms of lung cancer   > 11 months since last LDCT     Risks and benefits of lung cancer screening with LDCT scans discussed:    Significance of positive screen - False-positive LDCT results often occur. 95% of all positive results do not lead to a diagnosis of cancer. Usually further imaging can resolve most false-positive results; however, some patients may require invasive procedures. Over diagnosis risk - 10% to 12% of screen-detected lung cancer cases are over diagnosed--that is, the cancer would not have been detected in the patient's lifetime without the screening. Need for follow up screens annually to continue lung cancer screening effectiveness     Risks associated with radiation from annual LDCT- Radiation exposure is about the same as for a mammogram, which is about 1/3 of the annual background radiation exposure from everyday life. Starting screening at age 54 is not likely to increase cancer risk from radiation exposure. Patients with comorbidities resulting in life expectancy of < 10 years, or that would preclude treatment of an abnormality identified on CT, should not be screened due to lack of benefit.     To obtain maximal benefit from this screening, smoking cessation and long-term abstinence from smoking is critical

## 2021-03-29 LAB — VITAMIN B6: 43.4 NMOL/L (ref 20–125)

## 2021-03-30 LAB — VITAMIN B1 WHOLE BLOOD: 135 NMOL/L (ref 70–180)

## 2021-04-02 DIAGNOSIS — G89.29 CHRONIC ABDOMINAL PAIN: Primary | ICD-10-CM

## 2021-04-02 DIAGNOSIS — R10.9 CHRONIC ABDOMINAL PAIN: Primary | ICD-10-CM

## 2021-04-08 ENCOUNTER — OFFICE VISIT (OUTPATIENT)
Dept: FAMILY MEDICINE CLINIC | Age: 61
End: 2021-04-08
Payer: COMMERCIAL

## 2021-04-08 VITALS
BODY MASS INDEX: 20.91 KG/M2 | TEMPERATURE: 98.2 F | HEIGHT: 63 IN | HEART RATE: 112 BPM | WEIGHT: 118 LBS | SYSTOLIC BLOOD PRESSURE: 126 MMHG | DIASTOLIC BLOOD PRESSURE: 86 MMHG | OXYGEN SATURATION: 97 %

## 2021-04-08 DIAGNOSIS — R39.15 URINARY URGENCY: ICD-10-CM

## 2021-04-08 DIAGNOSIS — R10.9 CHRONIC ABDOMINAL PAIN: Primary | ICD-10-CM

## 2021-04-08 DIAGNOSIS — J44.9 CHRONIC OBSTRUCTIVE PULMONARY DISEASE, UNSPECIFIED COPD TYPE (HCC): ICD-10-CM

## 2021-04-08 DIAGNOSIS — G89.29 CHRONIC ABDOMINAL PAIN: Primary | ICD-10-CM

## 2021-04-08 DIAGNOSIS — R41.3 MEMORY CHANGES: ICD-10-CM

## 2021-04-08 DIAGNOSIS — I10 ESSENTIAL HYPERTENSION: ICD-10-CM

## 2021-04-08 PROCEDURE — 3023F SPIROM DOC REV: CPT | Performed by: FAMILY MEDICINE

## 2021-04-08 PROCEDURE — G8926 SPIRO NO PERF OR DOC: HCPCS | Performed by: FAMILY MEDICINE

## 2021-04-08 PROCEDURE — G8420 CALC BMI NORM PARAMETERS: HCPCS | Performed by: FAMILY MEDICINE

## 2021-04-08 PROCEDURE — 3017F COLORECTAL CA SCREEN DOC REV: CPT | Performed by: FAMILY MEDICINE

## 2021-04-08 PROCEDURE — G8427 DOCREV CUR MEDS BY ELIG CLIN: HCPCS | Performed by: FAMILY MEDICINE

## 2021-04-08 PROCEDURE — 99214 OFFICE O/P EST MOD 30 MIN: CPT | Performed by: FAMILY MEDICINE

## 2021-04-08 PROCEDURE — 4004F PT TOBACCO SCREEN RCVD TLK: CPT | Performed by: FAMILY MEDICINE

## 2021-04-08 NOTE — PROGRESS NOTES
[facility]  Office Progress Note - Dr. Renan Avina  4/8/21    CC:   Chief Complaint   Patient presents with    Depression        HPI:   Hypertension  Follow-up  Blood pressure is better and controlled today. BP Readings from Last 3 Encounters:   04/08/21 126/86   03/24/21 (!) 144/90   08/18/20 (!) 130/90     Patient continues medications regularly. Compliance is good. Denies CP, sob, abd pain, headaches, vision changes, dizziness, hypotensive symptoms. No side effects from medications noted. Added Advair for COPD  Reports she just started it this morning, but her breathing feels a little easier right now. She is not wheezing on exam.  Continue Spiriva and has albuterol inhaler. Continues smoking. Added thiamine given chronic Etoh and memory changes noted lats appt. Labs all normal I31, thiamine, folic acid, lipase. LFT mildly elevated chromically. Not trending. Mild hyponatremia  Ferritin was high at 369. CT abd pelv pending to check on pancreas in consideration of chronic pancreatitis or EPI. Mammo pending  CT lung screen pending. Says her memory seems better now and she thinks that she was having panic attacks  She has been watching her grandson 2 yo for pretyt much all winter 5 days a week. That's bene stressful. She felt like everything was piling up on her but feels like things are smoothing out now. She was having some wordfinding problems last time (swingset, picnic table)  She feels like this has not been as notable. Seemed to occur more when stressed last appt. She hasnt started the thiamine yet bc didn't know it was coming via [facility]     Given oxybutynin for suspected OAB. She just started it this morning. She stopped omeprazole bc didn't feel like it was helping  She has been off for a onth now and stomach fels about the same. And stopped wellbutrin for similar reasons. _________________________________________________________    Assessment / Laura Mir was seen today for depression. Diagnoses and all orders for this visit:    Chronic abdominal pain  CT of the abdomen and pelvis pending. She has had more of an appetite and has gained 3 pounds in the last 2 weeks. No significant changes made. Continues regular alcohol consumption. Essential hypertension  Blood pressure at goal today. Continue same medication. Patient was upset at her last appointment. Chronic obstructive pulmonary disease, unspecified COPD type ([address]  Addition of Advair seems to be helping, but she has only tried this for 1 day and it is really too soon to tell overall. Smoking cessation encouraged. She continue Spiriva and albuterol. Urinary urgency  Took oxybutynin this morning. Too soon to tell if it is helpful. Memory changes  Unclear etiology. She relates this to stress. I ordered her a thiamine supplement given the chronic alcohol consumption. She did not yet start it, she thinks that is probably in her box of medications from [address] which she has not yet open. Ct abd and screening lungs and mammo at [place]. Return in about 1 month (around 5/8/2021). or as scheduled. Patient counseled to follow up sooner or seek more acute care if symptoms worsening or not improving according to plan.      Electronically signed by Flora Fischer MD on 4/8/2021    _________________________________________________________  Current Outpatient Medications on File Prior to Visit   Medication Sig Dispense Refill    albuterol sulfate  (90 Base) MCG/ACT inhaler NHALE 2 PUFFS BY MOUTH INTO THE LUNGS EVERY 6 HOURS AS NEEDED FOR WHEEZING OR SHORTNESS OF BREATH 54 g 5    lisinopril (PRINIVIL;ZESTRIL) 10 MG tablet TAKE (1) TABLET BY MOUTH DAILY 30 tablet 5    Thiamine HCl (B-1) 100 MG TABS TAKE 1 TABLET BY MOUTH DAILY 90 tablet 3    tiotropium (SPIRIVA HANDIHALER) 18 MCG inhalation capsule Inhale 1 capsule into the lungs daily 30 capsule 5    fluticasone-salmeterol (ADVAIR DISKUS) 250-50 MCG/DOSE AEPB Inhale 1 puff into the lungs 2 times daily Rinse mouth after use. 1 Inhaler 5    oxybutynin (DITROPAN XL) 5 MG extended release tablet Take 1 tablet by mouth daily For bladder spasms 30 tablet 3    buPROPion (WELLBUTRIN XL) 300 MG extended release tablet TAKE 1 TABLET BY MOUTH EVERY MORNING (Patient not taking: Reported on 4/8/2021) 30 tablet 5    omeprazole (PRILOSEC) 20 MG delayed release capsule TAKE (1) CAPSULE BY MOUTH DAILY FOR UPSET STOMACH (Patient not taking: Reported on 8/18/2020) 90 capsule 3     No current facility-administered medications on file prior to visit. Patient Active Problem List   Diagnosis Code    Epigastric abdominal pain R10.13    Gastroesophageal reflux disease without esophagitis K21.9    Polyp of colon, adenomatous D12.6    Chronic obstructive pulmonary disease ([facility] [address] J44.9    Depression F32.9    Essential hypertension I10    Alcohol use disorder, moderate, dependence ([facility] [address] F10.20    Suspected COVID-19 virus infection Z20.822    Acute exacerbation of chronic obstructive pulmonary disease (COPD) ([facility] [address] J44.1     _________________________________________________________  Past Medical History:   Diagnosis Date    Abdominal pain 8-19-15    for EGD/Colonoscopy       Family History   Problem Relation Age of Onset    Cancer Mother     High Blood Pressure Father     Arthritis Father     Cancer Father        Past Surgical History:   Procedure Laterality Date    APPENDECTOMY  1995    COLONOSCOPY  8/19/15    ENDOSCOPY, COLON, DIAGNOSTIC  8/19/15       Social History     Tobacco Use    Smoking status: Current Every Day Smoker     Packs/day: 0.75     Years: 45.00     Pack years: 33.75     Types: Cigarettes    Smokeless tobacco: Never Used   Substance Use Topics    Alcohol use:  Yes     Alcohol/week: 70.0 standard drinks     Types: 70 Cans of beer per week     Comment: 8-12 cans daily    Drug use: No       Chart reviewed and updated where appropriate for PMH, Fam, and Soc Hx.  _________________________________________________________  ROS: POSITIVE: As in the HPI. Otherwise Pertinent negatives are negative.    __________________________________________________________  Physical Exam   /86 (Site: Right Upper Arm, Position: Sitting, Cuff Size: Medium Adult)   Pulse 112   Temp 98.2 °F (36.8 °C) (Temporal)   Ht 5' 3\" (1.6 m)   Wt 118 lb (53.5 kg)   LMP  (LMP Unknown)   SpO2 97%   BMI 20.90 kg/m²   Wt Readings from Last 3 Encounters:   04/08/21 118 lb (53.5 kg)   03/24/21 115 lb (52.2 kg)   08/18/20 115 lb (52.2 kg)       Constitutional:    She is oriented to person, place, and time. She appears well-developed and well-nourished. Eyes:    Conjunctivae are normal.    Pupils are equal, round, and reactive to light. EOMI. Neck:    Normal range of motion. No thyromegaly or nodules noted. No bruit. Cardiovascular:    Normal rate, regular rhythm and normal heart sounds. No murmur. No gallop and no friction rub. Pulmonary/Chest:    Effort normal and breath sounds normal.    No wheezes. No rales or rhonchi. Clear today. Abdominal:    Soft. Bowel sounds are normal.    No distension. Mild epigastric tenderness. Musculoskeletal:    Normal range of motion. No joint swelling noted. No peripheral edema. Skin:    Skin is warm and dry. No rashes, No lesions. Psychiatric:    She has a normal mood and affect. Normal groom and dress. No SI or HI.   ________________________________________________________    This note may have been created using dictation software.  Efforts were made to reduce grammatical or syntax errors, but some may persist.

## 2021-04-08 NOTE — Clinical Note
Patient needs mammogram, CT lung screening, and CT abdomen pelvis all scheduled at [place]. Ordered prior to this appointment and she has not been contacted. I originally ordered abdomen pelvis CT at [facility], but have changed it.

## 2021-04-09 ENCOUNTER — TELEPHONE (OUTPATIENT)
Dept: FAMILY MEDICINE CLINIC | Age: 61
End: 2021-04-09

## 2021-04-09 NOTE — TELEPHONE ENCOUNTER
All test have been faxed to [facility]. I called patient and advised that [facility] will be calling her to schedule and if she does not hear from them within the next week to give us a call.

## 2021-04-09 NOTE — TELEPHONE ENCOUNTER
----- Message from Helen Encarnacion MD sent at 4/8/2021  5:25 PM EDT -----  Patient needs mammogram, CT lung screening, and CT abdomen pelvis all scheduled at [facility]. Ordered prior to this appointment and she has not been contacted. I originally ordered abdomen pelvis CT at [facility], but have changed it.

## 2021-04-22 ENCOUNTER — TELEPHONE (OUTPATIENT)
Dept: FAMILY MEDICINE CLINIC | Age: 61
End: 2021-04-22

## 2021-04-22 NOTE — TELEPHONE ENCOUNTER
Liana calling about the CT scan ordered. The insurance has not covered this yet and is requesting US results be faxed to them.     She is asking if you want to get the US done there first?

## 2021-04-22 NOTE — TELEPHONE ENCOUNTER
CT abdomen and pelvis  Or CT lungs for screening? I ordered two CTs. Also, they are asking for an ultrasound first? Your message is not clear to me. I hadnt brought up an ultrasound with patient or in notes, I dont think. Also who is Liana?

## 2021-04-26 NOTE — TELEPHONE ENCOUNTER
Got it, thanks. Please advise patient CT abd likely wont be covered. She wanted this due to abdominal pains, and I had concern about her pancreas.    She improved though, but said she would still like the test.    If she wants to work toward that, I'll likely need to order an ultrasound of her abdomen first.

## 2021-04-29 NOTE — TELEPHONE ENCOUNTER
Spoke with patient. She states she has improved and she is okay for now. She will let us know if she would like to move forward with this in the future.

## 2021-05-10 ENCOUNTER — OFFICE VISIT (OUTPATIENT)
Dept: FAMILY MEDICINE CLINIC | Age: 61
End: 2021-05-10
Payer: COMMERCIAL

## 2021-05-10 VITALS
HEIGHT: 63 IN | HEART RATE: 83 BPM | WEIGHT: 119 LBS | SYSTOLIC BLOOD PRESSURE: 128 MMHG | OXYGEN SATURATION: 99 % | BODY MASS INDEX: 21.09 KG/M2 | TEMPERATURE: 97.8 F | DIASTOLIC BLOOD PRESSURE: 80 MMHG

## 2021-05-10 DIAGNOSIS — Z12.11 COLON CANCER SCREENING: ICD-10-CM

## 2021-05-10 DIAGNOSIS — F10.20 ALCOHOL USE DISORDER, MODERATE, DEPENDENCE (HCC): ICD-10-CM

## 2021-05-10 DIAGNOSIS — I73.00 RAYNAUD'S DISEASE WITHOUT GANGRENE: ICD-10-CM

## 2021-05-10 DIAGNOSIS — R10.84 GENERALIZED ABDOMINAL PAIN: Primary | ICD-10-CM

## 2021-05-10 DIAGNOSIS — L98.9 SKIN LESION: ICD-10-CM

## 2021-05-10 PROCEDURE — G8427 DOCREV CUR MEDS BY ELIG CLIN: HCPCS | Performed by: FAMILY MEDICINE

## 2021-05-10 PROCEDURE — 99214 OFFICE O/P EST MOD 30 MIN: CPT | Performed by: FAMILY MEDICINE

## 2021-05-10 PROCEDURE — G8420 CALC BMI NORM PARAMETERS: HCPCS | Performed by: FAMILY MEDICINE

## 2021-05-10 PROCEDURE — 4004F PT TOBACCO SCREEN RCVD TLK: CPT | Performed by: FAMILY MEDICINE

## 2021-05-10 PROCEDURE — 3017F COLORECTAL CA SCREEN DOC REV: CPT | Performed by: FAMILY MEDICINE

## 2021-05-10 RX ORDER — METHION/INOS/CHOL BT/B COM/LIV 110MG-86MG
CAPSULE ORAL
Qty: 90 TABLET | Refills: 3 | Status: SHIPPED
Start: 2021-05-10 | End: 2021-05-19 | Stop reason: ALTCHOICE

## 2021-05-10 RX ORDER — PANTOPRAZOLE SODIUM 40 MG/1
40 TABLET, DELAYED RELEASE ORAL
Qty: 30 TABLET | Refills: 0 | Status: SHIPPED
Start: 2021-05-10 | End: 2021-05-13

## 2021-05-10 NOTE — PROGRESS NOTES
ayah.  _________________________________________________________    Assessment / Helga Melo was seen today for gi problem. Diagnoses and all orders for this visit:    Generalized abdominal pain  -     Xavi Kay MD, General Surgery, [address] [place] [address]; Future  I have asked her to follow-up with her surgeon for upper and lower endoscopy. She is due for follow-up colonoscopy a few years ago. I suspect that she could have gastritis or an ulcer given that her abdominal pain does worsen when she eats and she has loss of appetite. We will try starting a high-dose PPI as the lower dose less potent PPI did not work in the past.  We will also get an ultrasound of her gallbladder to attend to any possibility of stones. -     pantoprazole (PROTONIX) 40 MG tablet; Take 1 tablet by mouth every morning (before breakfast)    Alcohol use disorder, moderate, dependence (HCC)  -Refill   thiamine HCl (B-1) 100 MG TABS; TAKE 1 TABLET BY MOUTH DAILY    Skin lesion  -     Yulia Nascimento DO, Dermatology, [place] ([place])  Suspect seborrheic keratosis, but this has changed recently. Colon cancer screening  -     Xavi Kay MD, General Surgery, [place]    Raynaud's disease without gangrene  Likely Raynaud's phenomenon. Not particularly bothersome at this time. Does not warrant taking medication right now and she does not want to go down that path yet. Encouraged to keep her hands as warm as she is able. Follow-up in 2 months or as scheduled. Patient counseled to follow up sooner or seek more acute care if symptoms worsening or not improving according to plan.      Electronically signed by Alena Grace MD on 5/10/2021    _________________________________________________________  Current Outpatient Medications on File Prior to Visit   Medication Sig Dispense Refill    albuterol sulfate  (90 Base) MCG/ACT inhaler NHALE 2 PUFFS BY MOUTH INTO THE LUNGS EVERY 6 HOURS AS NEEDED FOR WHEEZING OR SHORTNESS OF BREATH 54 g 5    lisinopril (PRINIVIL;ZESTRIL) 10 MG tablet TAKE (1) TABLET BY MOUTH DAILY 30 tablet 5    tiotropium (SPIRIVA HANDIHALER) 18 MCG inhalation capsule Inhale 1 capsule into the lungs daily 30 capsule 5    fluticasone-salmeterol (ADVAIR DISKUS) 250-50 MCG/DOSE AEPB Inhale 1 puff into the lungs 2 times daily Rinse mouth after use. 1 Inhaler 5    oxybutynin (DITROPAN XL) 5 MG extended release tablet Take 1 tablet by mouth daily For bladder spasms 30 tablet 3    omeprazole (PRILOSEC) 20 MG delayed release capsule TAKE (1) CAPSULE BY MOUTH DAILY FOR UPSET STOMACH (Patient not taking: Reported on 8/18/2020) 90 capsule 3     No current facility-administered medications on file prior to visit. Patient Active Problem List   Diagnosis Code    Epigastric abdominal pain R10.13    Gastroesophageal reflux disease without esophagitis K21.9    Polyp of colon, adenomatous D12.6    Chronic obstructive pulmonary disease ([facility] [address] J44.9    Depression F32.9    Essential hypertension I10    Alcohol use disorder, moderate, dependence ([facility] [address] F10.20    Suspected COVID-19 virus infection Z20.822    Acute exacerbation of chronic [facility] pulmonary disease (COPD) [facility] J44.1     _________________________________________________________  Past Medical History:   Diagnosis Date    Abdominal pain 8-19-15    for EGD/Colonoscopy       Family History   Problem Relation Age of Onset    Cancer Mother     High Blood Pressure Father     Arthritis Father     Cancer Father        Past Surgical History:   Procedure Laterality Date    APPENDECTOMY  1995    COLONOSCOPY  8/19/15    ENDOSCOPY, COLON, DIAGNOSTIC  8/19/15       Social History     Tobacco Use    Smoking status: Current Every Day Smoker     Packs/day: 0.75     Years: 45.00     Pack years: 33.75     Types: Cigarettes    Smokeless tobacco: Never Used   Substance Use Topics    Alcohol use:  Yes Alcohol/week: 70.0 standard drinks     Types: 70 Cans of beer per week     Comment: 8-12 cans daily    Drug use: No       Chart reviewed and updated where appropriate for PMH, Fam, and Soc Hx.  _________________________________________________________  ROS: POSITIVE: As in the HPI. Otherwise Pertinent negatives are negative.    __________________________________________________________  Physical Exam   Constitutional:    She is oriented to person, place, and time. She appears well-developed and well-nourished. Eyes:    Conjunctivae are normal.    Pupils are equal, round, and reactive to light. EOMI. Neck:    Normal range of motion. No thyromegaly or nodules noted. No bruit. No LAD. Cardiovascular:    Normal rate, regular rhythm and normal heart sounds. No murmur. No gallop and no friction rub. Pulmonary/Chest:    Effort normal and breath sounds normal.    No wheezes. No rales or rhonchi. Abdominal:    Soft. Bowel sounds are normal.    No distension. Mild epigastric tenderness. Musculoskeletal:    Normal range of motion. No joint swelling noted. No peripheral edema. Skin:    Skin is warm and dry. No rashes, No lesions. Psychiatric:    She has a normal mood and affect. Normal groom and dress. No SI or HI.   ________________________________________________________    This note may have been created using dictation software.  Efforts were made to reduce errors, but some may persist.

## 2021-05-13 RX ORDER — PANTOPRAZOLE SODIUM 40 MG/1
TABLET, DELAYED RELEASE ORAL
Qty: 30 TABLET | Refills: 5 | Status: ON HOLD
Start: 2021-05-13 | End: 2021-06-11 | Stop reason: SDUPTHER

## 2021-05-13 NOTE — TELEPHONE ENCOUNTER
Last Appointment:  5/10/2021  Future Appointments   Date Time Provider [place]   5/17/2021  8:00  [address]   5/19/2021  1:50 PM Juliette Lunsford MD [facility]   7/12/2021  8:20 AM Aba Chapa  [address] [facility] [address]

## 2021-05-19 ENCOUNTER — OFFICE VISIT (OUTPATIENT)
Dept: SURGERY | Age: 61
End: 2021-05-19
Payer: COMMERCIAL

## 2021-05-19 VITALS
RESPIRATION RATE: 16 BRPM | DIASTOLIC BLOOD PRESSURE: 80 MMHG | SYSTOLIC BLOOD PRESSURE: 110 MMHG | BODY MASS INDEX: 20.49 KG/M2 | HEART RATE: 112 BPM | WEIGHT: 120 LBS | HEIGHT: 64 IN

## 2021-05-19 DIAGNOSIS — R10.13 EPIGASTRIC PAIN: Primary | ICD-10-CM

## 2021-05-19 DIAGNOSIS — R11.14 BILIOUS VOMITING WITH NAUSEA: ICD-10-CM

## 2021-05-19 PROCEDURE — 3017F COLORECTAL CA SCREEN DOC REV: CPT | Performed by: SURGERY

## 2021-05-19 PROCEDURE — 4004F PT TOBACCO SCREEN RCVD TLK: CPT | Performed by: SURGERY

## 2021-05-19 PROCEDURE — G8420 CALC BMI NORM PARAMETERS: HCPCS | Performed by: SURGERY

## 2021-05-19 PROCEDURE — G8427 DOCREV CUR MEDS BY ELIG CLIN: HCPCS | Performed by: SURGERY

## 2021-05-19 PROCEDURE — 99214 OFFICE O/P EST MOD 30 MIN: CPT | Performed by: SURGERY

## 2021-05-19 NOTE — PROGRESS NOTES
General Surgery History and Physical    Patient's Name/Date of Birth: Liban Chavez / 1960    Date: 5/19/2021    PCP: Emmanuel Alfonso MD    Referring Physician:   Jigar Joseph  [phone]    CHIEF COMPLAINT:    Chief Complaint   Patient presents with    New Patient    Abdominal Pain    Colonoscopy     consult         HISTORY OF PRESENT ILLNESS:    Liban Chavez is an 64 y.o. female who presents with bloating, dry heaving that awakens her in the middle of the night, work. She said she has no appetite. She has diarrhea as well. She said she has these symptoms intermittently but it can last for months at a time. She said these are similar to the symptoms she has in 2019 when I saw her last but it has worsened. She said she is now having heartburn which is new. She has a hard time telling if certain foods make it worse. She has upper abdominal pain as well. Past Medical History:   Past Medical History:   Diagnosis Date    Abdominal pain 8-19-15    for EGD/Colonoscopy        Past Surgical History:   Past Surgical History:   Procedure Laterality Date    APPENDECTOMY  1995    COLONOSCOPY  8/19/15    ENDOSCOPY, COLON, DIAGNOSTIC  8/19/15        Allergies: Patient has no known allergies. Medications:   Current Outpatient Medications   Medication Sig Dispense Refill    albuterol sulfate  (90 Base) MCG/ACT inhaler NHALE 2 PUFFS BY MOUTH INTO THE LUNGS EVERY 6 HOURS AS NEEDED FOR WHEEZING OR SHORTNESS OF BREATH 54 g 5    lisinopril (PRINIVIL;ZESTRIL) 10 MG tablet TAKE (1) TABLET BY MOUTH DAILY 30 tablet 5    tiotropium (SPIRIVA HANDIHALER) 18 MCG inhalation capsule Inhale 1 capsule into the lungs daily 30 capsule 5    fluticasone-salmeterol (ADVAIR DISKUS) 250-50 MCG/DOSE AEPB Inhale 1 puff into the lungs 2 times daily Rinse mouth after use.  1 Inhaler 5    oxybutynin (DITROPAN XL) 5 MG extended release tablet Take 1 tablet by mouth daily For bladder spasms 30 tablet 3    complications associated with the above procedure to be performed and transfusions when applicable with the patient/responsible person prior to the procedure. I discussed the risk of bowel peroration, postoperative bleeding, post-polypectomy syndrome, as well as the possibility of needing emergency surgery or another colonoscopy. All of the patient's questions were answered. The patient understands and agrees to the procedure. Check HIDA with [employer].            Physician Signature: Electronically signed by Yu Talley MD, General Surgery    Send copy of H&P to PCP, Carmelina Mdoi MD and referring physician, Kristi Weaver,*

## 2021-05-20 ENCOUNTER — TELEPHONE (OUTPATIENT)
Dept: SURGERY | Age: 61
End: 2021-05-20

## 2021-05-20 NOTE — TELEPHONE ENCOUNTER
Called patient's insurance and no auth required for HIDA Scan. Ref [id number]. ANAHY with Juliana scheduling to schedule. Order faxed to [facility].

## 2021-05-21 ENCOUNTER — TELEPHONE (OUTPATIENT)
Dept: SURGERY | Age: 61
End: 2021-05-21

## 2021-05-21 NOTE — TELEPHONE ENCOUNTER
Prior Authorization Form:      DEMOGRAPHICS:                     Patient Name:  Mimi Kelly  Patient :  1960            Insurance:  Payor: [id number] [place] [address]  [address] / Plan: [facility] [address] / Product Type: *No Product type* /   Insurance ID Number:    Payor/Plan Subscr  Sex Relation Sub.  Ins. ID Effective Group Num   1. SILVIO TRAN* DEMETRIO ARNETT 1960 Female Self [id number] 1/1/15                                    [address]         DIAGNOSIS & PROCEDURE:                       Procedure/Operation: EGD/COLONOSCOPY           CPT Code: 18631/84454    Diagnosis:  EPIGASTRIC PAIN/DIARRHEA    ICD10 Code: R10.13/R19.7    Location:  [facility]     Surgeon:  Duyen Back    SCHEDULING INFORMATION:                          Date: 21    Time: 8AM              Anesthesia:  MAC/TIVA                                                       Status:  Outpatient        Special Comments:         Electronically signed by Vince Johnson MA on 2021 at 11:27 AM

## 2021-05-21 NOTE — TELEPHONE ENCOUNTER
Scheduled for 6/1 at [place] [address] at 7:45AM. NPO after midnight.   Detailed message left for patient

## 2021-06-10 ENCOUNTER — ANESTHESIA EVENT (OUTPATIENT)
Dept: ENDOSCOPY | Age: 61
End: 2021-06-10
Payer: COMMERCIAL

## 2021-06-10 RX ORDER — THIAMINE MONONITRATE (VIT B1) 100 MG
100 TABLET ORAL DAILY
COMMUNITY
End: 2021-07-12

## 2021-06-11 ENCOUNTER — HOSPITAL ENCOUNTER (OUTPATIENT)
Age: 61
Setting detail: OUTPATIENT SURGERY
Discharge: HOME | End: 2021-06-11
Attending: SURGERY | Admitting: SURGERY
Payer: COMMERCIAL

## 2021-06-11 ENCOUNTER — ANESTHESIA (OUTPATIENT)
Dept: ENDOSCOPY | Age: 61
End: 2021-06-11
Payer: COMMERCIAL

## 2021-06-11 VITALS — DIASTOLIC BLOOD PRESSURE: 66 MMHG | SYSTOLIC BLOOD PRESSURE: 102 MMHG | OXYGEN SATURATION: 96 %

## 2021-06-11 VITALS
WEIGHT: 118 LBS | HEART RATE: 84 BPM | OXYGEN SATURATION: 94 % | SYSTOLIC BLOOD PRESSURE: 141 MMHG | BODY MASS INDEX: 20.91 KG/M2 | RESPIRATION RATE: 16 BRPM | TEMPERATURE: 97.5 F | DIASTOLIC BLOOD PRESSURE: 78 MMHG | HEIGHT: 63 IN

## 2021-06-11 PROCEDURE — 43239 EGD BIOPSY SINGLE/MULTIPLE: CPT | Performed by: SURGERY

## 2021-06-11 PROCEDURE — 3700000000 HC ANESTHESIA ATTENDED CARE: Performed by: SURGERY

## 2021-06-11 PROCEDURE — 88108 CYTOPATH CONCENTRATE TECH: CPT

## 2021-06-11 PROCEDURE — 3700000001 HC ADD 15 MINUTES (ANESTHESIA): Performed by: SURGERY

## 2021-06-11 PROCEDURE — 88305 TISSUE EXAM BY PATHOLOGIST: CPT

## 2021-06-11 PROCEDURE — 88312 SPECIAL STAINS GROUP 1: CPT

## 2021-06-11 PROCEDURE — 2580000003 HC RX 258

## 2021-06-11 PROCEDURE — 2709999900 HC NON-CHARGEABLE SUPPLY: Performed by: SURGERY

## 2021-06-11 PROCEDURE — 7100000010 HC PHASE II RECOVERY - FIRST 15 MIN: Performed by: SURGERY

## 2021-06-11 PROCEDURE — 6360000002 HC RX W HCPCS

## 2021-06-11 PROCEDURE — 88342 IMHCHEM/IMCYTCHM 1ST ANTB: CPT

## 2021-06-11 PROCEDURE — 7100000011 HC PHASE II RECOVERY - ADDTL 15 MIN: Performed by: SURGERY

## 2021-06-11 PROCEDURE — 3609012400 HC EGD TRANSORAL BIOPSY SINGLE/MULTIPLE: Performed by: SURGERY

## 2021-06-11 RX ORDER — PANTOPRAZOLE SODIUM 40 MG/1
40 TABLET, DELAYED RELEASE ORAL 2 TIMES DAILY
Qty: 120 TABLET | Refills: 0 | Status: SHIPPED | OUTPATIENT
Start: 2021-06-11 | End: 2021-08-17

## 2021-06-11 RX ORDER — SODIUM CHLORIDE 9 MG/ML
INJECTION, SOLUTION INTRAVENOUS CONTINUOUS PRN
Status: DISCONTINUED | OUTPATIENT
Start: 2021-06-11 | End: 2021-06-11 | Stop reason: SDUPTHER

## 2021-06-11 RX ORDER — PROPOFOL 10 MG/ML
INJECTION, EMULSION INTRAVENOUS PRN
Status: DISCONTINUED | OUTPATIENT
Start: 2021-06-11 | End: 2021-06-11 | Stop reason: SDUPTHER

## 2021-06-11 RX ORDER — FLUCONAZOLE 100 MG/1
200 TABLET ORAL DAILY
Qty: 28 TABLET | Refills: 0 | Status: SHIPPED | OUTPATIENT
Start: 2021-06-11 | End: 2021-06-25

## 2021-06-11 RX ADMIN — SODIUM CHLORIDE: 9 INJECTION, SOLUTION INTRAVENOUS at 07:56

## 2021-06-11 RX ADMIN — PROPOFOL 220 MG: 10 INJECTION, EMULSION INTRAVENOUS at 07:59

## 2021-06-11 ASSESSMENT — LIFESTYLE VARIABLES: SMOKING_STATUS: 1

## 2021-06-11 ASSESSMENT — PAIN SCALES - GENERAL
PAINLEVEL_OUTOF10: 0

## 2021-06-11 ASSESSMENT — PAIN - FUNCTIONAL ASSESSMENT: PAIN_FUNCTIONAL_ASSESSMENT: 0-10

## 2021-06-11 ASSESSMENT — COPD QUESTIONNAIRES: CAT_SEVERITY: MODERATE

## 2021-06-11 NOTE — PROGRESS NOTES
Have you been tested for COVID  Yes           Have you been told you were positive for COVID No  Have you had any known exposure to someone that is positive for COVID No  Do you have a cough                   No              Do you have shortness of breath No                 Do you have a sore throat            No                Are you having chills                    No                Are you having muscle aches. No                    Please come to the hospital wearing a mask and have your significant other wear a mask as well. Both of you should check your temperature before leaving to come here,  if it is 100 or higher please call [phone] for instruction.
Patient had called earlier and said she had thrown up her prep and had no result. Was told to come in anyway.
Patient has been fully vaccinated for COVID -19, preop COVID test not required.
would greatly appreciate your comments    [x] Please notify surgeon if you develop any illness between now and time of surgery (cold, cough, sore throat, fever, nausea, vomiting) or any signs of infections  including skin, wounds, and dental.    []  The Outpatient Pharmacy is available to fill your prescription here on your day of surgery, ask your preop nurse for details

## 2021-06-11 NOTE — ANESTHESIA POSTPROCEDURE EVALUATION
Department of Anesthesiology  Postprocedure Note    Patient: Ramon Rincon  MRN: [id number]  YOB: 1960  Date of evaluation: 6/11/2021  Time:  8:57 AM     Procedure Summary     Date: 06/11/21 Room / Location: [facility] 01 / 106 [facility]    Anesthesia Start: 2032 Anesthesia Stop: 3728    Procedure: EGD BIOPSY (N/A ) Diagnosis: (EPIGASTRIC PAIN DIARRHEA)    Surgeons: Ben Quiñonez MD Responsible Provider: Jacob Campos MD    Anesthesia Type: MAC ASA Status: 3          Anesthesia Type: MAC    Juan Luis Phase I: Juan Luis Score: 10    Juan Luis Phase II: Juan Luis Score: 10    Last vitals: Reviewed and per EMR flowsheets.        Anesthesia Post Evaluation    Patient location during evaluation: bedside  Patient participation: complete - patient participated  Level of consciousness: awake  Pain score: 2  Airway patency: patent  Nausea & Vomiting: no nausea  Complications: no  Cardiovascular status: blood pressure returned to baseline  Respiratory status: acceptable  Hydration status: euvolemic

## 2021-06-11 NOTE — H&P
Patient's office history and physical was reviewed. Patient examined. There has been no change in the patient's history and physical.    Patient did not tolerate her prep and is not clear. Will proceed with EGD only. Physician Signature: Electronically signed by Dr. Delmy Rosa Surgery History and Physical    Patient's Name/Date of Birth: Zoë Zamora / 1960    Date: 5/20/2021    PCP: Dylan Avila MD    Referring Physician:   No ref. provider found  N/A    CHIEF COMPLAINT:    No chief complaint on file. HISTORY OF PRESENT ILLNESS:    Zoë Zamora is an 64 y.o. female who presents with bloating, dry heaving that awakens her in the middle of the night, work. She said she has no appetite. She has diarrhea as well. She said she has these symptoms intermittently but it can last for months at a time. She said these are similar to the symptoms she has in 2019 when I saw her last but it has worsened. She said she is now having heartburn which is new. She has a hard time telling if certain foods make it worse. She has upper abdominal pain as well. Past Medical History:   Past Medical History:   Diagnosis Date    Bladder spasm     COPD (chronic obstructive pulmonary disease) (HCC)     Diarrhea     GERD (gastroesophageal reflux disease)     Hypertension         Past Surgical History:   Past Surgical History:   Procedure Laterality Date    APPENDECTOMY  1995    COLONOSCOPY  8/19/15    ENDOSCOPY, COLON, DIAGNOSTIC  8/19/15        Allergies: Patient has no known allergies. Medications:   No current facility-administered medications for this encounter. Social History:   Social History     Tobacco Use    Smoking status: Current Every Day Smoker     Packs/day: 1.00     Years: 45.00     Pack years: 45.00     Types: Cigarettes    Smokeless tobacco: Never Used   Substance Use Topics    Alcohol use:  Yes     Alcohol/week: 70.0 standard drinks     Types: 70 Electronically signed by Mukesh Fernandez MD, General Surgery    Send copy of H&P to PCP, Samanta Dougherty MD and referring physician, No ref.  provider found

## 2021-06-11 NOTE — OP NOTE
Operative Note: EGD    Trevor Blue     DATE OF PROCEDURE: 6/11/2021  SURGEON: Dr. Dm Mackey MD, M.D. PREOPERATIVE DIAGNOSES:   Nausea and vomiting  Diarrhea  Bloating    POSTOPERATIVE DIAGNOSES:  R/O candida esophagitis  Moderately severe gastritis  Duodenitis      OPERATION:    EGD esophagogastroduodenoscopy with distal esophageal, gastric body, antral, duodenal biopsies  Esophageal brushings    SPECIMENS:  ID Type Source Tests Collected by Time Destination   A : DUODENUM Tissue Tissue SURGICAL PATHOLOGY Myke Mitchell MD 6/11/2021 0802    B : ANTRUM Tissue Tissue SURGICAL PATHOLOGY Myke Mitchell MD 6/11/2021 0803    C : GASTRIC BODY Tissue Tissue SURGICAL PATHOLOGY Myke Mitchell MD 6/11/2021 0805    D : DISTAL ESOPHAGUS Tissue Tissue SURGICAL PATHOLOGY Myke Mitchell MD 6/11/2021 5907    E : ESOPHAGUS Respiratory Esophageal [place] CYTOLOGY, NON-GYN Myke Mitchell MD 6/11/2021 0809        ANESTHESIA: LMAC    BLOOD LOSS: Minimal    CONSENT AND INDICATIONS:  This is a 64y.o. year old female who is having the above issues. I have discussed with the patient and/or the patient representative the indication, alternatives, and the possible risks and/or complications of the planned procedure and the anesthesia methods. The patient and/or patient representative appear to understand and agree to proceed. OPERATIONS: The patient was placed on the table and sedated by anesthesia. Bite block was placed. A lubricated scope was easily passed into the upper esophagus which looked abnormal: white exudate that is suspicious for candida and brushings were taken. The distal esophagus looked abnormal: GERD. The gastroesophageal junction was at 40 cm. The scope was passed into the stomach and retroflexed. There was a small hiatal hernia. The gastric body had white patchy appearence and was also biopsied. The scope was passed down toward the pylorus.  The antral mucosa all looked abnormal: moderately severe gastritis. Biopsy was taken to check for H. pylori. The scope was then passed through the pylorus into the duodenal bulb which looked abnormal: duodenitis, then around to the distal duodenum which looked abnormal and biopsies were taken, and the scope was then withdrawn. The patient tolerated the procedure well. PLAN: Follow up biopsies. Increase protonix to BID  Diflucan  HIDA showed EF 18%, will need cholecystectomy. Will need colonoscopy once these above issues are resolved and she can tolerate a prep. Physician Signature: Electronically signed by Dr. Arin Funes MD [employer]. FACS    Send copy of H&P to PCP, Shaheed Link MD and referring physician, No ref.  provider found

## 2021-06-16 ENCOUNTER — OFFICE VISIT (OUTPATIENT)
Dept: SURGERY | Age: 61
End: 2021-06-16
Payer: COMMERCIAL

## 2021-06-16 VITALS
HEART RATE: 104 BPM | DIASTOLIC BLOOD PRESSURE: 80 MMHG | WEIGHT: 111 LBS | BODY MASS INDEX: 19.66 KG/M2 | SYSTOLIC BLOOD PRESSURE: 130 MMHG | RESPIRATION RATE: 16 BRPM

## 2021-06-16 DIAGNOSIS — K80.50 BILIARY COLIC: ICD-10-CM

## 2021-06-16 DIAGNOSIS — B37.81 CANDIDA ESOPHAGITIS (HCC): ICD-10-CM

## 2021-06-16 DIAGNOSIS — K29.90 GASTRODUODENITIS: Primary | ICD-10-CM

## 2021-06-16 PROCEDURE — G8427 DOCREV CUR MEDS BY ELIG CLIN: HCPCS | Performed by: SURGERY

## 2021-06-16 PROCEDURE — G8420 CALC BMI NORM PARAMETERS: HCPCS | Performed by: SURGERY

## 2021-06-16 PROCEDURE — 3017F COLORECTAL CA SCREEN DOC REV: CPT | Performed by: SURGERY

## 2021-06-16 PROCEDURE — 4004F PT TOBACCO SCREEN RCVD TLK: CPT | Performed by: SURGERY

## 2021-06-16 PROCEDURE — 99214 OFFICE O/P EST MOD 30 MIN: CPT | Performed by: SURGERY

## 2021-06-16 RX ORDER — SODIUM CHLORIDE 0.9 % (FLUSH) 0.9 %
10 SYRINGE (ML) INJECTION PRN
Status: CANCELLED | OUTPATIENT
Start: 2021-06-16

## 2021-06-16 RX ORDER — SODIUM CHLORIDE 9 MG/ML
25 INJECTION, SOLUTION INTRAVENOUS PRN
Status: CANCELLED | OUTPATIENT
Start: 2021-06-16

## 2021-06-16 RX ORDER — INDOCYANINE GREEN AND WATER 25 MG
2.5 KIT INJECTION ONCE
Status: CANCELLED | OUTPATIENT
Start: 2021-06-16 | End: 2021-06-16

## 2021-06-16 RX ORDER — DOXYCYCLINE HYCLATE 100 MG
100 TABLET ORAL 2 TIMES DAILY
Qty: 28 TABLET | Refills: 0 | Status: SHIPPED | OUTPATIENT
Start: 2021-06-16 | End: 2021-06-30

## 2021-06-16 RX ORDER — SODIUM CHLORIDE 0.9 % (FLUSH) 0.9 %
10 SYRINGE (ML) INJECTION EVERY 12 HOURS SCHEDULED
Status: CANCELLED | OUTPATIENT
Start: 2021-06-16

## 2021-06-16 RX ORDER — AMOXICILLIN AND CLAVULANATE POTASSIUM 875; 125 MG/1; MG/1
1 TABLET, FILM COATED ORAL 2 TIMES DAILY
Qty: 20 TABLET | Refills: 0 | Status: CANCELLED | OUTPATIENT
Start: 2021-06-16 | End: 2021-06-26

## 2021-06-17 ENCOUNTER — TELEPHONE (OUTPATIENT)
Dept: SURGERY | Age: 61
End: 2021-06-17

## 2021-06-22 ENCOUNTER — ANESTHESIA EVENT (OUTPATIENT)
Dept: OPERATING ROOM | Age: 61
End: 2021-06-22
Payer: COMMERCIAL

## 2021-06-22 RX ORDER — ADHESIVE BANDAGE 3/4"
BANDAGE TOPICAL
COMMUNITY
Start: 2021-05-28 | End: 2021-06-22

## 2021-06-22 RX ORDER — BUPROPION HYDROCHLORIDE 300 MG/1
TABLET ORAL EVERY MORNING
COMMUNITY
Start: 2021-05-28 | End: 2021-06-22 | Stop reason: ALTCHOICE

## 2021-06-22 NOTE — PROGRESS NOTES
[facility] PRE-ADMISSION TESTING INSTRUCTIONS    The Preadmission Testing patient is instructed accordingly using the following criteria (check applicable):    ARRIVAL INSTRUCTIONS:  [x] Parking the day of Surgery is located in the Main Entrance lot. Upon entering the door, make an immediate right to the surgery reception desk    [x] Bring photo ID and insurance card    [] Bring in a copy of Living will or Durable Power of  papers. [x] Please be sure to arrange transportation to and from the hospital    [x] Please arrange for someone to be with you the remainder of the day due to having anesthesia      GENERAL INSTRUCTIONS:    [x] Nothing by mouth after midnight, including gum, candy, mints or water    [x] You may brush your teeth, but do not swallow any water    [x] Take medications as instructed with 1-2 oz of water    [] Stop herbal supplements and vitamins 5 days prior to procedure    [x] Follow preop dosing of blood thinners per physician instructions    [] Do not take insulin or oral diabetic medications    [] If diabetic and have low blood sugar or feel symptomatic, take 1-2oz apple juice or glucose tablets    [] Bring inhalers day of surgery    [] Bring C-PAP/ Bi-Pap day of surgery    [] Bring urine specimen day of surgery    [x] Antibacterial Soap shower or bath AM of Surgery, no lotion, powders or creams to surgical site    [] Follow bowel prep as instructed per surgeon    [x] No tobacco products within 24 hours of surgery     [x] No alcohol or illegal drug use within 24 hours of surgery.     [x] Jewelry, body piercing's, eyeglasses, contact lenses and dentures are not permitted into surgery (bring cases)      [] Please do not wear any nail polish or make up on the day of surgery    [x] If not already done, you can expect a call from registration    [x] If surgeon requests a time change you will be notified the day prior to surgery    [] If you receive a survey after surgery we would greatly appreciate your comments    [] Parent/guardian of a minor must accompany their child and remain on the premises  the entire time they are under our care     [] Pediatric patients may bring favorite toy, blanket or comfort item with them    [] A caregiver or family member must remain with the patient during their stay if they are mentally handicapped, have dementia, disoriented or unable to use a call light or would be a safety concern if left unattended    [x] Please notify surgeon if you develop any illness between now and time of surgery (cold, cough, sore throat, fever, nausea, vomiting) or any signs of infections  including skin, wounds, and dental.    [] Other instructions    EDUCATIONAL MATERIALS PROVIDED:    [] PAT Preoperative Education Packet/Booklet     [] Medication List    [] Fluoroscopy Information Pamphlet    [] Transfusion bracelet applied with instructions    [] Joint replacement video reviewed    [] Shower with antibacterial soap and use CHG wipes provided the evening before surgery as instructed

## 2021-06-22 NOTE — PROGRESS NOTES
Have you been tested for COVID  Yes           Have you been told you were positive for COVID No  Have you had any known exposure to someone that is positive for COVID No  Do you have a cough                   No              Do you have shortness of breath No                 Do you have a sore throat            No                Are you having chills                    No                Are you having muscle aches. No                    Please come to the hospital wearing a mask and have your significant other wear a mask as well. Both of you should check your temperature before leaving to come here,  if it is 100 or higher please call [phone] for instruction.     HAS COUGH DAILY NO CHANGE

## 2021-06-23 ENCOUNTER — HOSPITAL ENCOUNTER (OUTPATIENT)
Age: 61
Setting detail: OUTPATIENT SURGERY
Discharge: HOME | End: 2021-06-23
Attending: SURGERY | Admitting: SURGERY
Payer: COMMERCIAL

## 2021-06-23 ENCOUNTER — ANESTHESIA (OUTPATIENT)
Dept: OPERATING ROOM | Age: 61
End: 2021-06-23
Payer: COMMERCIAL

## 2021-06-23 VITALS
OXYGEN SATURATION: 92 % | RESPIRATION RATE: 16 BRPM | DIASTOLIC BLOOD PRESSURE: 81 MMHG | BODY MASS INDEX: 19.67 KG/M2 | HEART RATE: 98 BPM | SYSTOLIC BLOOD PRESSURE: 133 MMHG | HEIGHT: 63 IN | TEMPERATURE: 98.1 F | WEIGHT: 111 LBS

## 2021-06-23 VITALS — SYSTOLIC BLOOD PRESSURE: 161 MMHG | DIASTOLIC BLOOD PRESSURE: 80 MMHG | OXYGEN SATURATION: 98 %

## 2021-06-23 DIAGNOSIS — Z01.818 PRE-OP EXAM: Primary | ICD-10-CM

## 2021-06-23 DIAGNOSIS — G89.18 POSTOPERATIVE PAIN: ICD-10-CM

## 2021-06-23 LAB
ALBUMIN SERPL-MCNC: 4 G/DL (ref 3.5–5.2)
ALBUMIN SERPL-MCNC: 4 G/DL (ref 3.5–5.2)
ALP BLD-CCNC: 88 U/L (ref 35–104)
ALP BLD-CCNC: 89 U/L (ref 35–104)
ALT SERPL-CCNC: 27 U/L (ref 0–32)
ALT SERPL-CCNC: 28 U/L (ref 0–32)
ANION GAP SERPL CALCULATED.3IONS-SCNC: 9 MMOL/L (ref 7–16)
AST SERPL-CCNC: 30 U/L (ref 0–31)
AST SERPL-CCNC: 30 U/L (ref 0–31)
BILIRUB SERPL-MCNC: 0.5 MG/DL (ref 0–1.2)
BILIRUB SERPL-MCNC: 0.5 MG/DL (ref 0–1.2)
BILIRUBIN DIRECT: <0.2 MG/DL (ref 0–0.3)
BILIRUBIN, INDIRECT: NORMAL MG/DL (ref 0–1)
BUN BLDV-MCNC: 5 MG/DL (ref 6–23)
CALCIUM SERPL-MCNC: 9.8 MG/DL (ref 8.6–10.2)
CHLORIDE BLD-SCNC: 94 MMOL/L (ref 98–107)
CO2: 27 MMOL/L (ref 22–29)
CREAT SERPL-MCNC: 0.5 MG/DL (ref 0.5–1)
EKG ATRIAL RATE: 94 BPM
EKG P AXIS: 80 DEGREES
EKG P-R INTERVAL: 156 MS
EKG Q-T INTERVAL: 374 MS
EKG QRS DURATION: 94 MS
EKG QTC CALCULATION (BAZETT): 467 MS
EKG R AXIS: 75 DEGREES
EKG T AXIS: 66 DEGREES
EKG VENTRICULAR RATE: 94 BPM
GFR AFRICAN AMERICAN: >60
GFR NON-AFRICAN AMERICAN: >60 ML/MIN/1.73
GLUCOSE BLD-MCNC: 133 MG/DL (ref 74–99)
HCG, URINE, POC: NEGATIVE
HCT VFR BLD CALC: 43.7 % (ref 34–48)
HEMOGLOBIN: 15.2 G/DL (ref 11.5–15.5)
Lab: NORMAL
MCH RBC QN AUTO: 34.2 PG (ref 26–35)
MCHC RBC AUTO-ENTMCNC: 34.8 % (ref 32–34.5)
MCV RBC AUTO: 98.2 FL (ref 80–99.9)
NEGATIVE QC PASS/FAIL: NORMAL
PDW BLD-RTO: 12.4 FL (ref 11.5–15)
PLATELET # BLD: 292 E9/L (ref 130–450)
PMV BLD AUTO: 9.2 FL (ref 7–12)
POSITIVE QC PASS/FAIL: NORMAL
POTASSIUM REFLEX MAGNESIUM: 4.3 MMOL/L (ref 3.5–5)
RBC # BLD: 4.45 E12/L (ref 3.5–5.5)
SODIUM BLD-SCNC: 130 MMOL/L (ref 132–146)
TOTAL PROTEIN: 7.1 G/DL (ref 6.4–8.3)
TOTAL PROTEIN: 7.3 G/DL (ref 6.4–8.3)
WBC # BLD: 6.5 E9/L (ref 4.5–11.5)

## 2021-06-23 PROCEDURE — 88304 TISSUE EXAM BY PATHOLOGIST: CPT

## 2021-06-23 PROCEDURE — 36415 COLL VENOUS BLD VENIPUNCTURE: CPT

## 2021-06-23 PROCEDURE — 7100000001 HC PACU RECOVERY - ADDTL 15 MIN: Performed by: SURGERY

## 2021-06-23 PROCEDURE — 93005 ELECTROCARDIOGRAM TRACING: CPT

## 2021-06-23 PROCEDURE — 7100000010 HC PHASE II RECOVERY - FIRST 15 MIN: Performed by: SURGERY

## 2021-06-23 PROCEDURE — 2580000003 HC RX 258

## 2021-06-23 PROCEDURE — 7100000000 HC PACU RECOVERY - FIRST 15 MIN: Performed by: SURGERY

## 2021-06-23 PROCEDURE — 47562 LAPAROSCOPIC CHOLECYSTECTOMY: CPT | Performed by: SURGERY

## 2021-06-23 PROCEDURE — 80053 COMPREHEN METABOLIC PANEL: CPT

## 2021-06-23 PROCEDURE — S2900 ROBOTIC SURGICAL SYSTEM: HCPCS | Performed by: SURGERY

## 2021-06-23 PROCEDURE — 6360000002 HC RX W HCPCS

## 2021-06-23 PROCEDURE — 2709999900 HC NON-CHARGEABLE SUPPLY: Performed by: SURGERY

## 2021-06-23 PROCEDURE — 3600000019 HC SURGERY ROBOT ADDTL 15MIN: Performed by: SURGERY

## 2021-06-23 PROCEDURE — 80076 HEPATIC FUNCTION PANEL: CPT

## 2021-06-23 PROCEDURE — 85027 COMPLETE CBC AUTOMATED: CPT

## 2021-06-23 PROCEDURE — 3600000009 HC SURGERY ROBOT BASE: Performed by: SURGERY

## 2021-06-23 PROCEDURE — 7100000011 HC PHASE II RECOVERY - ADDTL 15 MIN: Performed by: SURGERY

## 2021-06-23 PROCEDURE — 94664 DEMO&/EVAL PT USE INHALER: CPT

## 2021-06-23 PROCEDURE — 3700000000 HC ANESTHESIA ATTENDED CARE: Performed by: SURGERY

## 2021-06-23 PROCEDURE — 3700000001 HC ADD 15 MINUTES (ANESTHESIA): Performed by: SURGERY

## 2021-06-23 PROCEDURE — 2500000003 HC RX 250 WO HCPCS: Performed by: SURGERY

## 2021-06-23 PROCEDURE — 6360000002 HC RX W HCPCS: Performed by: ANESTHESIOLOGY

## 2021-06-23 PROCEDURE — 2500000003 HC RX 250 WO HCPCS: Performed by: ANESTHESIOLOGY

## 2021-06-23 PROCEDURE — 6360000002 HC RX W HCPCS: Performed by: SURGERY

## 2021-06-23 PROCEDURE — 2500000003 HC RX 250 WO HCPCS

## 2021-06-23 RX ORDER — PROPOFOL 10 MG/ML
INJECTION, EMULSION INTRAVENOUS PRN
Status: DISCONTINUED | OUTPATIENT
Start: 2021-06-23 | End: 2021-06-23 | Stop reason: SDUPTHER

## 2021-06-23 RX ORDER — OXYCODONE HYDROCHLORIDE AND ACETAMINOPHEN 5; 325 MG/1; MG/1
1 TABLET ORAL
Status: DISCONTINUED | OUTPATIENT
Start: 2021-06-23 | End: 2021-06-23 | Stop reason: HOSPADM

## 2021-06-23 RX ORDER — DEXAMETHASONE SODIUM PHOSPHATE 4 MG/ML
INJECTION, SOLUTION INTRA-ARTICULAR; INTRALESIONAL; INTRAMUSCULAR; INTRAVENOUS; SOFT TISSUE PRN
Status: DISCONTINUED | OUTPATIENT
Start: 2021-06-23 | End: 2021-06-23 | Stop reason: SDUPTHER

## 2021-06-23 RX ORDER — INDOCYANINE GREEN AND WATER 25 MG
2.5 KIT INJECTION ONCE
Status: COMPLETED | OUTPATIENT
Start: 2021-06-23 | End: 2021-06-23

## 2021-06-23 RX ORDER — HYDROCODONE BITARTRATE AND ACETAMINOPHEN 5; 325 MG/1; MG/1
1 TABLET ORAL EVERY 4 HOURS PRN
Qty: 18 TABLET | Refills: 0 | Status: SHIPPED | OUTPATIENT
Start: 2021-06-23 | End: 2021-06-26

## 2021-06-23 RX ORDER — PROMETHAZINE HYDROCHLORIDE 25 MG/ML
6.25 INJECTION, SOLUTION INTRAMUSCULAR; INTRAVENOUS
Status: DISCONTINUED | OUTPATIENT
Start: 2021-06-23 | End: 2021-06-23 | Stop reason: HOSPADM

## 2021-06-23 RX ORDER — LABETALOL HYDROCHLORIDE 5 MG/ML
5 INJECTION, SOLUTION INTRAVENOUS EVERY 10 MIN PRN
Status: DISCONTINUED | OUTPATIENT
Start: 2021-06-23 | End: 2021-06-23 | Stop reason: HOSPADM

## 2021-06-23 RX ORDER — MEPERIDINE HYDROCHLORIDE 25 MG/ML
12.5 INJECTION INTRAMUSCULAR; INTRAVENOUS; SUBCUTANEOUS EVERY 5 MIN PRN
Status: DISCONTINUED | OUTPATIENT
Start: 2021-06-23 | End: 2021-06-23 | Stop reason: HOSPADM

## 2021-06-23 RX ORDER — SODIUM CHLORIDE 9 MG/ML
INJECTION, SOLUTION INTRAVENOUS CONTINUOUS PRN
Status: DISCONTINUED | OUTPATIENT
Start: 2021-06-23 | End: 2021-06-23 | Stop reason: SDUPTHER

## 2021-06-23 RX ORDER — SODIUM CHLORIDE 0.9 % (FLUSH) 0.9 %
10 SYRINGE (ML) INJECTION PRN
Status: DISCONTINUED | OUTPATIENT
Start: 2021-06-23 | End: 2021-06-23 | Stop reason: HOSPADM

## 2021-06-23 RX ORDER — GLYCOPYRROLATE 1 MG/5 ML
SYRINGE (ML) INTRAVENOUS PRN
Status: DISCONTINUED | OUTPATIENT
Start: 2021-06-23 | End: 2021-06-23 | Stop reason: SDUPTHER

## 2021-06-23 RX ORDER — ALBUTEROL SULFATE 2.5 MG/3ML
2.5 SOLUTION RESPIRATORY (INHALATION) ONCE
Status: COMPLETED | OUTPATIENT
Start: 2021-06-23 | End: 2021-06-23

## 2021-06-23 RX ORDER — FENTANYL CITRATE 50 UG/ML
INJECTION, SOLUTION INTRAMUSCULAR; INTRAVENOUS PRN
Status: DISCONTINUED | OUTPATIENT
Start: 2021-06-23 | End: 2021-06-23 | Stop reason: SDUPTHER

## 2021-06-23 RX ORDER — ONDANSETRON 2 MG/ML
INJECTION INTRAMUSCULAR; INTRAVENOUS PRN
Status: DISCONTINUED | OUTPATIENT
Start: 2021-06-23 | End: 2021-06-23 | Stop reason: SDUPTHER

## 2021-06-23 RX ORDER — LIDOCAINE HYDROCHLORIDE 20 MG/ML
INJECTION, SOLUTION EPIDURAL; INFILTRATION; INTRACAUDAL; PERINEURAL PRN
Status: DISCONTINUED | OUTPATIENT
Start: 2021-06-23 | End: 2021-06-23 | Stop reason: SDUPTHER

## 2021-06-23 RX ORDER — SODIUM CHLORIDE 9 MG/ML
25 INJECTION, SOLUTION INTRAVENOUS PRN
Status: DISCONTINUED | OUTPATIENT
Start: 2021-06-23 | End: 2021-06-23 | Stop reason: HOSPADM

## 2021-06-23 RX ORDER — ESMOLOL HYDROCHLORIDE 10 MG/ML
INJECTION INTRAVENOUS PRN
Status: DISCONTINUED | OUTPATIENT
Start: 2021-06-23 | End: 2021-06-23 | Stop reason: SDUPTHER

## 2021-06-23 RX ORDER — MIDAZOLAM HYDROCHLORIDE 1 MG/ML
INJECTION INTRAMUSCULAR; INTRAVENOUS PRN
Status: DISCONTINUED | OUTPATIENT
Start: 2021-06-23 | End: 2021-06-23 | Stop reason: SDUPTHER

## 2021-06-23 RX ORDER — ROCURONIUM BROMIDE 10 MG/ML
INJECTION, SOLUTION INTRAVENOUS PRN
Status: DISCONTINUED | OUTPATIENT
Start: 2021-06-23 | End: 2021-06-23 | Stop reason: SDUPTHER

## 2021-06-23 RX ORDER — NEOSTIGMINE METHYLSULFATE 1 MG/ML
INJECTION, SOLUTION INTRAVENOUS PRN
Status: DISCONTINUED | OUTPATIENT
Start: 2021-06-23 | End: 2021-06-23 | Stop reason: SDUPTHER

## 2021-06-23 RX ORDER — SODIUM CHLORIDE 0.9 % (FLUSH) 0.9 %
10 SYRINGE (ML) INJECTION EVERY 12 HOURS SCHEDULED
Status: DISCONTINUED | OUTPATIENT
Start: 2021-06-23 | End: 2021-06-23 | Stop reason: HOSPADM

## 2021-06-23 RX ORDER — HYDRALAZINE HYDROCHLORIDE 20 MG/ML
5 INJECTION INTRAMUSCULAR; INTRAVENOUS EVERY 10 MIN PRN
Status: DISCONTINUED | OUTPATIENT
Start: 2021-06-23 | End: 2021-06-23 | Stop reason: HOSPADM

## 2021-06-23 RX ADMIN — FENTANYL CITRATE 100 MCG: 50 INJECTION, SOLUTION INTRAMUSCULAR; INTRAVENOUS at 08:13

## 2021-06-23 RX ADMIN — MIDAZOLAM 2 MG: 1 INJECTION INTRAMUSCULAR; INTRAVENOUS at 08:03

## 2021-06-23 RX ADMIN — Medication 2000 MG: at 08:18

## 2021-06-23 RX ADMIN — PROPOFOL 200 MG: 10 INJECTION, EMULSION INTRAVENOUS at 08:13

## 2021-06-23 RX ADMIN — Medication 2.5 MG: at 08:59

## 2021-06-23 RX ADMIN — ROCURONIUM BROMIDE 50 MG: 10 INJECTION, SOLUTION INTRAVENOUS at 08:13

## 2021-06-23 RX ADMIN — SODIUM CHLORIDE: 9 INJECTION, SOLUTION INTRAVENOUS at 08:02

## 2021-06-23 RX ADMIN — ALBUTEROL SULFATE 2.5 MG: 2.5 SOLUTION RESPIRATORY (INHALATION) at 07:47

## 2021-06-23 RX ADMIN — ESMOLOL HYDROCHLORIDE 50 MG: 10 INJECTION, SOLUTION INTRAVENOUS at 09:01

## 2021-06-23 RX ADMIN — INDOCYANINE GREEN AND WATER 2.5 MG: KIT at 07:29

## 2021-06-23 RX ADMIN — ONDANSETRON 4 MG: 2 INJECTION INTRAMUSCULAR; INTRAVENOUS at 08:59

## 2021-06-23 RX ADMIN — DEXAMETHASONE SODIUM PHOSPHATE 10 MG: 4 INJECTION, SOLUTION INTRAMUSCULAR; INTRAVENOUS at 08:22

## 2021-06-23 RX ADMIN — Medication 0.5 MG: at 08:59

## 2021-06-23 RX ADMIN — LIDOCAINE HYDROCHLORIDE 100 MG: 20 INJECTION, SOLUTION EPIDURAL; INFILTRATION; INTRACAUDAL; PERINEURAL at 08:13

## 2021-06-23 ASSESSMENT — PULMONARY FUNCTION TESTS
PIF_VALUE: 18
PIF_VALUE: 18
PIF_VALUE: 11
PIF_VALUE: 26
PIF_VALUE: 2
PIF_VALUE: 18
PIF_VALUE: 18
PIF_VALUE: 2
PIF_VALUE: 11
PIF_VALUE: 18
PIF_VALUE: 11
PIF_VALUE: 13
PIF_VALUE: 11
PIF_VALUE: 18
PIF_VALUE: 20
PIF_VALUE: 18
PIF_VALUE: 13
PIF_VALUE: 13
PIF_VALUE: 2
PIF_VALUE: 11
PIF_VALUE: 16
PIF_VALUE: 18
PIF_VALUE: 13
PIF_VALUE: 18
PIF_VALUE: 12
PIF_VALUE: 11
PIF_VALUE: 18
PIF_VALUE: 17
PIF_VALUE: 18
PIF_VALUE: 13
PIF_VALUE: 11
PIF_VALUE: 18
PIF_VALUE: 2
PIF_VALUE: 1
PIF_VALUE: 19
PIF_VALUE: 15
PIF_VALUE: 17
PIF_VALUE: 11
PIF_VALUE: 18
PIF_VALUE: 2
PIF_VALUE: 12
PIF_VALUE: 14
PIF_VALUE: 11
PIF_VALUE: 17
PIF_VALUE: 12
PIF_VALUE: 11
PIF_VALUE: 18
PIF_VALUE: 2
PIF_VALUE: 17
PIF_VALUE: 11
PIF_VALUE: 2
PIF_VALUE: 13
PIF_VALUE: 16
PIF_VALUE: 11
PIF_VALUE: 18
PIF_VALUE: 16
PIF_VALUE: 18
PIF_VALUE: 18

## 2021-06-23 ASSESSMENT — PAIN SCALES - GENERAL
PAINLEVEL_OUTOF10: 0
PAINLEVEL_OUTOF10: 3
PAINLEVEL_OUTOF10: 0

## 2021-06-23 ASSESSMENT — LIFESTYLE VARIABLES: SMOKING_STATUS: 1

## 2021-06-23 NOTE — H&P
Patient's office history and physical was reviewed. Patient examined. There has been no change in the patient's history and physical.      Physician Signature: Electronically signed by Dr. Mary Ann Jean    Patient's Name/Date of Birth: Ethan Roque / 1960    Date: 6/17/2021    PCP: Vladimir Mckeon MD    Referring Physician:   No ref. provider found  N/A    No chief complaint on file. HPI:    The patient continues to have upper abdominal pain, nausea, diarrhea. She also has dysphagia and burning with swallowing. She said she has not gotten the diflucan yet because it was sent to her mail order prescription service. Patient's medications, allergies, past medical, surgical, social and family histories were reviewed and updated as appropriate. Review of Systems  Constitutional: negative  Respiratory: negative  Cardiovascular: negative  Gastrointestinal: as in hpi  Genitourinary:negative  Integument/breast: negative    Physical Exam:  /70   Pulse 97   Temp 98.7 °F (37.1 °C) (Temporal)   Resp 16   Ht 5' 3\" (1.6 m)   Wt 111 lb (50.3 kg)   LMP  (LMP Unknown)   SpO2 97%   BMI 19.66 kg/m²   General appearance: alert, cooperative and in no acute distress. Lungs: normal work of breathing  Heart: regular rate  Abdomen: soft, nontender, nondistended  Musculoskeletal: symmetrical without edema.   Skin: normal     Data Reviewed:   Pathology: Diagnosis:   A.  Duodenum, biopsy: Chronic duodenitis with gastric foveolar   metaplasia, consistent with peptic duodenitis     B.  Stomach, antrum, biopsy: Reactive gastropathy with superimposed mild   chronic gastritis; immunostain negative for Helicobacter pylori organisms     No evidence of intestinal metaplasia or malignancy     C.  Stomach, body, biopsy: Mild chronic gastritis with lamina propria   edema; immunostain negative for Helicobacter pylori organisms   Microcystic glandular change suggestive of proton pump inhibitor effect   No evidence of intestinal metaplasia or malignancy     D.  Distal esophagus, biopsy: Detached, superficial squamous epithelium   with fungal organisms consistent with Candida species (positive by PAS   stain) and bacterial colonization   Intact esophageal squamous mucosa with no significant pathologic change   Gastric body-type mucosa with mild chronic gastritis; immunostain   negative for Helicobacter pylori  organisms   No evidence of intestinal metaplasia or malignancy     Cytology:    DIAGNOSIS   NEGATIVE FOR MALIGNANT CELLS     Reactive squamous cells, fungal organisms morphologically consistent with candida, abundant bacteria, and neutrophils present. Cellblock is similar. HIDA  IMPRESSION:             HIDA scan with CCK: Normal filling of the gallbladder. The ejection fraction measured 18% (Normal      range 30-50%).           Impression/Plan:  64y.o. year old female with:    1) Gastroduodenitis - continue PPI  2) Esophagitis - candida and bacteria: Doxycycline , Diflucan  3) Robotic assisted Laparoscopic possible open cholecystectomy possible intraoperative cholangiogram  Risks of cholecystectomy were discussed with the patient. These include but are not limited to common bile duct injury, bile leak, liver injury, damage to blood vessels and bleeding, injury to bowel with port placement, as well as infection in the abdomen or of the incisions. I explained all other risks, benefits, alternatives, and potential complications associated with the procedure to be performed and transfusions when applicable with the patient/responsible person prior to the procedure. All of the patient's questions were answered. The patient understands and agrees to surgery. Electronically by Linus Hanna MD, General Surgery  on 6/23/2021 at 7:55 AM      Send copy of H&P to PCP, Jose Cano MD and referring physician, No ref.  provider found      6/17/2021

## 2021-06-23 NOTE — ANESTHESIA PRE PROCEDURE
Department of Anesthesiology  Preprocedure Note       Name:  Pedro Terry   Age:  64 y.o.  :  1960                                          MRN:  [id number]         Date:  2021      Surgeon: Galo Scales):  Agatha Osler, MD    Procedure: Procedure(s):  LAPAROSCOPIC ROBOTIC XI ASSISTED CHOLECYSTECTOMY POSSIBLE OPEN POSSIBLE GRAM    Medications prior to admission:   Prior to Admission medications    Medication Sig Start Date End Date Taking? Authorizing Provider   doxycycline hyclate (VIBRA-TABS) 100 MG tablet Take 1 tablet by mouth 2 times daily for 14 days 21 Yes Agatha Osler, MD   pantoprazole (PROTONIX) 40 MG tablet Take 1 tablet by mouth 2 times daily 6/11/21 8/10/21 Yes Agatha Osler, MD   fluconazole (DIFLUCAN) 100 MG tablet Take 2 tablets by mouth daily for 14 days  Patient taking differently: Take 200 mg by mouth daily TAKING FOR ESOPHAGEAL INFECTION 21 Yes Agatha Osler, MD   vitamin B-1 (THIAMINE) 100 MG tablet Take 100 mg by mouth daily   Yes Historical Provider, MD   albuterol sulfate  (90 Base) MCG/ACT inhaler NHALE 2 PUFFS BY MOUTH INTO THE LUNGS EVERY 6 HOURS AS NEEDED FOR WHEEZING OR SHORTNESS OF BREATH 3/24/21  Yes Aiyana Machado MD   lisinopril (PRINIVIL;ZESTRIL) 10 MG tablet TAKE (1) TABLET BY MOUTH DAILY 3/24/21  Yes Aiyana Machado MD   tiotropium (Carine Sly) 18 MCG inhalation capsule Inhale 1 capsule into the lungs daily 3/24/21  Yes Aiyana Machado MD   fluticasone-salmeterol (ADVAIR DISKUS) 250-50 MCG/DOSE AEPB Inhale 1 puff into the lungs 2 times daily Rinse mouth after use. 3/24/21  Yes Aiyana Machado MD   oxybutynin (DITROPAN XL) 5 MG extended release tablet Take 1 tablet by mouth daily For bladder spasms 3/24/21  Yes Aiyana Machado MD       Current medications:    No current facility-administered medications for this encounter.        Allergies:  No Known Allergies    Problem List:    Patient Active Problem List   Diagnosis Code    Epigastric abdominal pain R10.13    Gastroesophageal reflux disease without esophagitis K21.9    Polyp of colon, adenomatous D12.6    Chronic obstructive pulmonary disease ([facility] [address] J44.9    Depression F32.9    Essential hypertension I10    Alcohol use disorder, moderate, dependence ([facility] [address] F10.20    Suspected COVID-19 virus infection Z20.822    Acute exacerbation of chronic obstructive pulmonary disease (COPD) ([facility] [address] J44.1    Bilious vomiting with nausea R11.14       Past Medical History:        Diagnosis Date    Bladder spasm     COPD (chronic obstructive pulmonary disease) (HCC)     Diarrhea     GERD (gastroesophageal reflux disease)     Hypertension        Past Surgical History:        Procedure Laterality Date    APPENDECTOMY  1995    COLONOSCOPY  8/19/15    ENDOSCOPY, COLON, DIAGNOSTIC  8/19/15    UPPER GASTROINTESTINAL ENDOSCOPY N/A 6/11/2021    EGD [facility]OPSY performed by Walter Bonner MD at [facility] ENDOSCOPY       Social History:    Social History     Tobacco Use    Smoking status: Current Every Day Smoker     Packs/day: 1.00     Years: 45.00     Pack years: 45.00     Types: Cigarettes    Smokeless tobacco: Never Used   Substance Use Topics    Alcohol use:  Yes     Alcohol/week: 70.0 standard drinks     Types: 70 Cans of beer per week     Comment: 12 cans daily                                Ready to quit: Not Answered  Counseling given: Not Answered      Vital Signs (Current):   Vitals:    06/22/21 0925   Weight: 111 lb (50.3 kg)   Height: 5' 3\" (1.6 m)                                              BP Readings from Last 3 Encounters:   06/16/21 130/80   06/11/21 102/66   06/11/21 (!) 141/78       NPO Status:                                                                                 BMI:   Wt Readings from Last 3 Encounters:   06/22/21 111 lb (50.3 kg)   06/16/21 111 lb (50.3 kg)   06/11/21 118 lb (53.5 kg) Body mass index is 19.66 kg/m². CBC:   Lab Results   Component Value Date    WBC 7.1 03/24/2021    RBC 5.01 03/24/2021    HGB 17.1 03/24/2021    HCT 49.6 03/24/2021    MCV 99.0 03/24/2021    RDW 13.0 03/24/2021     03/24/2021       CMP:   Lab Results   Component Value Date     03/24/2021    K 4.2 03/24/2021    CL 92 03/24/2021    CO2 23 03/24/2021    BUN 4 03/24/2021    CREATININE 0.5 03/24/2021    GFRAA >60 03/24/2021    LABGLOM >60 03/24/2021    GLUCOSE 90 03/24/2021    PROT 7.9 03/24/2021    CALCIUM 9.7 03/24/2021    BILITOT 0.6 03/24/2021    ALKPHOS 89 03/24/2021    AST 46 03/24/2021    ALT 38 03/24/2021       POC Tests: No results for input(s): POCGLU, POCNA, POCK, POCCL, POCBUN, POCHEMO, POCHCT in the last 72 hours. Coags: No results found for: PROTIME, INR, APTT    HCG (If Applicable): No results found for: PREGTESTUR, PREGSERUM, HCG, HCGQUANT     ABGs: No results found for: PHART, PO2ART, CSX8JKU, AYK2BRR, BEART, G6VZGJKE     Type & Screen (If Applicable):  No results found for: LABABO, LABRH    Drug/Infectious Status (If Applicable):  No results found for: HIV, HEPCAB    COVID-19 Screening (If Applicable):   Lab Results   Component Value Date    COVID19 Not Detected 08/18/2020           Anesthesia Evaluation  Patient summary reviewed no history of anesthetic complications:   Airway: Mallampati: II  TM distance: >3 FB   Neck ROM: full  Mouth opening: > = 3 FB Dental:          Pulmonary: breath sounds clear to auscultation  (+) COPD:  current smoker          Patient did not smoke on day of surgery. ROS comment: CT Chest 4/2021:     1. Extensive emphysematous changes with upper lobe predominance. Suspect pulmonary fibrosis in the     lung bases similar to 02/25/2020.     2.  No significant lung nodules are identified.  Annual Low-Dose CT Lung Screening is suggested.         Cardiovascular:  Exercise tolerance: good (>4 METS),   (+) hypertension:,       ECG reviewed  Rhythm: regular  Rate: normal                 ROS comment: EKG 4/2017:  Sinus  Tachycardia   -Combined atrial enlargement. Possible left ventricular hypertrophy on non-voltage basis. -Nonspecific ST depression   +   Nonspecific T-abnormality  -Seen with left ventricular hypertrophy (strain). Neuro/Psych:   (+) psychiatric history (ETOH dependence, depression):            GI/Hepatic/Renal:   (+) GERD:, PUD ( Gastroduodenitis),          ROS comment: Bladder spasm. Endo/Other:    (+) blood dyscrasia (Erythrocytosis)::., .                 Abdominal:           Vascular: negative vascular ROS. Anesthesia Plan      general     ASA 3       Induction: intravenous. MIPS: Postoperative opioids intended, Prophylactic antiemetics administered and Postoperative trial extubation. Anesthetic plan and risks discussed with patient. Use of blood products discussed with patient whom did not consent to blood products.                  Kiesha Puckett MD   6/23/2021

## 2021-06-23 NOTE — PROGRESS NOTES
CLINICAL PHARMACY NOTE: MEDS TO BEDS    Total # of Prescriptions Filled: 1   The following medications were delivered to the patient:  · norco 5/ 325 mg    Additional Documentation:

## 2021-06-23 NOTE — OP NOTE
Operative Note - Robotic Assisted Laparoscopic Cholecystectomy    Liz Escobedo     DATE OF PROCEDURE: 6/23/2021    SURGEON: Surgeon(s):  Teagan Walker MD    PREOPERATIVE DIAGNOSIS: Biliary dyskinesia    POSTOPERATIVE DIAGNOSIS: Same    OPERATION: Robotic Assisted Laparoscopic cholecystectomy. ANESTHESIA: General endotracheal.     ESTIMATED BLOOD LOSS: minimal    SPECIMEN: Gallbladder    COMPLICATIONS: None. BRIEF HISTORY: Liz Escobedo is a 64 y.o.  female who presented with RUQ pain. I discussed the procedure with the patient, including the procedure, benefits, risks, and alternatives. She agreed. ICG was given in preoperative holding prior to the procedure. PROCEDURE: The patient was taken to the operative suite and was placed on the table in the supine position. General endotracheal anesthesia was administered. An orogastric tube was placed to decompress the stomach. The abdomen was then prepped with Chloraprep and draped in a sterile fashion. An 8 mm incision was made at Trevino's point with an 11-blade scalpel, and then while tenting the abdominal wall upward, a Veress needle was easily inserted through the abdominal cavity. After confirmation of its placement using the saline drop test, a total of approximately 3 L of carbon dioxide was insufflated, creating a pneumoperitoneum. The Veress needle was removed, and an 8 mm trocar was inserted while tenting the abdominal wall upward. A prepared laparoscope was inserted, and the right upper quadrant was visualized. An 8-mm port was placed in the supraumbilical position, another two 8 mm ports were placed in the RLQ. There was no injury from port placement. The robot was then placed over the patient and the ports docked. I then broke scrub and began the case at the surgeon console. The robotic scope was introduced into the abdomen and two Cadiere graspers were placed in arms 1 and 2 under direct vision.  A hook was then skin incisions were closed with 4-0 Monocryl subcuticular sutures. Skin glue was placed over all incisions. The patient tolerated the procedure well. Sponge, needle, and instrument counts were correct. The orogastric tube was replaced, and the patient was extubated and sent to the postanesthesia care unit in stable condition. Severa Ear, MD, MD, Karie Spencer 6/23/2021 8:52 AM    Send copy of H&P to PCP, William Ramirez MD and referring physician, No ref.  provider found

## 2021-06-23 NOTE — ANESTHESIA POSTPROCEDURE EVALUATION
Department of Anesthesiology  Postprocedure Note    Patient: Gay De Leon  MRN: [id number]  YOB: 1960  Date of evaluation: 6/23/2021  Time:  12:10 PM     Procedure Summary     Date: 06/23/21 Room / Location: [address]    Anesthesia Start: 6525 Anesthesia Stop: 0912    Procedure: [address] (N/A Abdomen) Diagnosis: (BILIARY DYSKINESIA)    Surgeons: Talita Anderson MD Responsible Provider: Carin Cervantes MD    Anesthesia Type: general ASA Status: 3          Anesthesia Type: general    Juan Luis Phase I: Juan Luis Score: 10    Juan Luis Phase II: Juan Luis Score: 10    Last vitals: Reviewed and per EMR flowsheets.        Anesthesia Post Evaluation    Patient location during evaluation: PACU  Patient participation: complete - patient participated  Level of consciousness: awake and alert  Airway patency: patent  Nausea & Vomiting: no nausea and no vomiting  Complications: no  Cardiovascular status: hemodynamically stable  Respiratory status: acceptable  Hydration status: euvolemic

## 2021-07-08 ENCOUNTER — OFFICE VISIT (OUTPATIENT)
Dept: SURGERY | Age: 61
End: 2021-07-08

## 2021-07-08 VITALS
WEIGHT: 108.2 LBS | RESPIRATION RATE: 16 BRPM | HEART RATE: 81 BPM | BODY MASS INDEX: 19.17 KG/M2 | OXYGEN SATURATION: 98 % | DIASTOLIC BLOOD PRESSURE: 77 MMHG | HEIGHT: 63 IN | TEMPERATURE: 98.2 F | SYSTOLIC BLOOD PRESSURE: 126 MMHG

## 2021-07-08 DIAGNOSIS — Z09 POSTOP CHECK: Primary | ICD-10-CM

## 2021-07-08 PROCEDURE — 99024 POSTOP FOLLOW-UP VISIT: CPT | Performed by: SURGERY

## 2021-07-08 NOTE — PROGRESS NOTES
Progress Note - Post-op follow up     Patient's Name/Date of Birth: Adams Cabral / 1960    Date: 7/8/2021    PCP: Mariana Shaver MD    Referring Physician:   Jeffrey Chatman  [phone]    Chief Complaint   Patient presents with    Post-Op Check     post op check, lap olena and also needs a colonoscopy. Subjective:  Patient presents to the office following surgery. The patient is tolerating a regular diet. Denies n/v/d/c. Pain controlled with pain medication. The patient took the doxycycline and diflucan foe her esophagitis. She said it feels a little sore but it feels better overall. Patient's medications, allergies, past medical, surgical, social and family histories were reviewed and updated as appropriate. Physical Exam:  /77   Pulse 81   Temp 98.2 °F (36.8 °C) (Temporal)   Resp 16   Ht 5' 3\" (1.6 m)   Wt 108 lb 3.2 oz (49.1 kg)   LMP  (LMP Unknown)   SpO2 98%   BMI 19.17 kg/m²   General Appearance: NAD  Abdomen: soft, non-distended mild incisional tenderness, no rebound or guarding, incision C/D/I    Data Reviewed: Pathology reviewed with patient  Diagnosis:   Gallbladder, cholecystectomy: Mild chronic cholecystitis.      Assessment/Plan:    64y.o. year old female s/p robotic assisted laparoscopic cholecystectomy     Patient recovering well from surgery  Wound care discussed    Colonoscopy in 3 months  Follow up in 3 months    Osman Monreal MD 7/8/2021 10:15 AM     Send copy of H&P to PCP, Mariana Shaver MD and referring physician, Jeffrey Chatman,*

## 2021-07-12 ENCOUNTER — OFFICE VISIT (OUTPATIENT)
Dept: FAMILY MEDICINE CLINIC | Age: 61
End: 2021-07-12
Payer: COMMERCIAL

## 2021-07-12 VITALS
BODY MASS INDEX: 19.14 KG/M2 | TEMPERATURE: 98.8 F | HEIGHT: 63 IN | DIASTOLIC BLOOD PRESSURE: 86 MMHG | OXYGEN SATURATION: 96 % | SYSTOLIC BLOOD PRESSURE: 146 MMHG | HEART RATE: 120 BPM | WEIGHT: 108 LBS

## 2021-07-12 DIAGNOSIS — K52.9 CHRONIC DIARRHEA: ICD-10-CM

## 2021-07-12 DIAGNOSIS — R39.15 URINARY URGENCY: Primary | ICD-10-CM

## 2021-07-12 DIAGNOSIS — I10 ESSENTIAL HYPERTENSION: ICD-10-CM

## 2021-07-12 PROCEDURE — G8427 DOCREV CUR MEDS BY ELIG CLIN: HCPCS | Performed by: FAMILY MEDICINE

## 2021-07-12 PROCEDURE — 4004F PT TOBACCO SCREEN RCVD TLK: CPT | Performed by: FAMILY MEDICINE

## 2021-07-12 PROCEDURE — 99214 OFFICE O/P EST MOD 30 MIN: CPT | Performed by: FAMILY MEDICINE

## 2021-07-12 PROCEDURE — G8420 CALC BMI NORM PARAMETERS: HCPCS | Performed by: FAMILY MEDICINE

## 2021-07-12 PROCEDURE — 3017F COLORECTAL CA SCREEN DOC REV: CPT | Performed by: FAMILY MEDICINE

## 2021-07-12 RX ORDER — LISINOPRIL 20 MG/1
TABLET ORAL
Qty: 30 TABLET | Refills: 2 | Status: SHIPPED
Start: 2021-07-12 | End: 2021-12-23 | Stop reason: SDUPTHER

## 2021-07-12 RX ORDER — OXYBUTYNIN CHLORIDE 10 MG/1
10 TABLET, EXTENDED RELEASE ORAL DAILY
Qty: 30 TABLET | Refills: 2 | Status: SHIPPED
Start: 2021-07-12 | End: 2021-10-06 | Stop reason: SDUPTHER

## 2021-07-12 SDOH — ECONOMIC STABILITY: FOOD INSECURITY: WITHIN THE PAST 12 MONTHS, YOU WORRIED THAT YOUR FOOD WOULD RUN OUT BEFORE YOU GOT MONEY TO BUY MORE.: NEVER TRUE

## 2021-07-12 SDOH — ECONOMIC STABILITY: FOOD INSECURITY: WITHIN THE PAST 12 MONTHS, THE FOOD YOU BOUGHT JUST DIDN'T LAST AND YOU DIDN'T HAVE MONEY TO GET MORE.: NEVER TRUE

## 2021-07-12 ASSESSMENT — SOCIAL DETERMINANTS OF HEALTH (SDOH): HOW HARD IS IT FOR YOU TO PAY FOR THE VERY BASICS LIKE FOOD, HOUSING, MEDICAL CARE, AND HEATING?: VERY HARD

## 2021-07-12 NOTE — PROGRESS NOTES
no major concerns. Abd pain improvement s/p cholecystectomy. Consider questran if diarrhea persisting. Return in about 3 months (around 10/12/2021). or as scheduled. Patient counseled to follow up sooner or seek more acute care if symptoms worsening or not improving according to plan. Electronically signed by Norman Miller MD on 7/14/2021    _________________________________________________________  Current Outpatient Medications on File Prior to Visit   Medication Sig Dispense Refill    pantoprazole (PROTONIX) 40 MG tablet Take 1 tablet by mouth 2 times daily 120 tablet 0    albuterol sulfate  (90 Base) MCG/ACT inhaler NHALE 2 PUFFS BY MOUTH INTO THE LUNGS EVERY 6 HOURS AS NEEDED FOR WHEEZING OR SHORTNESS OF BREATH 54 g 5    tiotropium (SPIRIVA HANDIHALER) 18 MCG inhalation capsule Inhale 1 capsule into the lungs daily 30 capsule 5    fluticasone-salmeterol (ADVAIR DISKUS) 250-50 MCG/DOSE AEPB Inhale 1 puff into the lungs 2 times daily Rinse mouth after use. 1 Inhaler 5     No current facility-administered medications on file prior to visit.        Patient Active Problem List   Diagnosis Code    Epigastric abdominal pain R10.13    Gastroesophageal reflux disease without esophagitis K21.9    Polyp of colon, adenomatous D12.6    Chronic obstructive pulmonary disease ([address] J44.9    Depression F32.9    Essential hypertension I10    Alcohol use disorder, moderate, dependence ([address] F10.20    Suspected COVID-19 virus infection Z20.822    Acute exacerbation of chronic obstructive pulmonary disease (COPD) ([facility]) J44.1    Bilious vomiting with nausea R11.14     _________________________________________________________  Past Medical History:   Diagnosis Date    Bladder spasm     COPD (chronic obstructive pulmonary disease) (HCC)     Diarrhea     GERD (gastroesophageal reflux disease)     Hypertension        Family History   Problem Relation Age of Onset    Cancer Mother     High Blood Pressure Father     Arthritis Father     Cancer Father        Past Surgical History:   Procedure Laterality Date    APPENDECTOMY  1995    CHOLECYSTECTOMY, LAPAROSCOPIC N/A 6/23/2021    LAPAROSCOPIC ROBOTIC XI ASSISTED CHOLECYSTECTOMY performed by Love Puentes MD [facility] COLONOSCOPY  8/19/15    ENDOSCOPY, COLON, DIAGNOSTIC  8/19/15    UPPER GASTROINTESTINAL ENDOSCOPY N/A 6/11/2021    EGD BIOPSY performed by Love Puentes MD at [facility] ENDOSCOPY       Social History     Tobacco Use    Smoking status: Current Every Day Smoker     Packs/day: 1.00     Years: 45.00     Pack years: 45.00     Types: Cigarettes    Smokeless tobacco: Never Used   Vaping Use    Vaping Use: Never used   Substance Use Topics    Alcohol use: Yes     Alcohol/week: 70.0 standard drinks     Types: 70 Cans of beer per week     Comment: 12 cans daily    Drug use: Yes     Types: Marijuana     Comment: had marijuana 6/21/21       Chart reviewed and updated where appropriate for PMH, Fam, and Soc Hx.  _________________________________________________________  ROS: POSITIVE: As in the HPI. Otherwise Pertinent negatives are negative.    __________________________________________________________  Physical Exam   Constitutional:    She is oriented to person, place, and time. She appears well-developed and well-nourished. Cardiovascular:    Normal rate, regular rhythm and normal heart sounds. No murmur. No gallop and no friction rub. Pulmonary/Chest:    Effort normal and breath sounds normal.    No wheezes. No rales or rhonchi. Abdominal:    Soft. Bowel sounds are normal.    No distension. No tenderness. Musculoskeletal:    Normal range of motion. No joint swelling noted. No peripheral edema. Skin:    Skin is warm and dry. No rashes, No lesions. Psychiatric:    She has a normal mood and affect. Normal groom and dress. No SI or HI. ________________________________________________________    This note may have been created using dictation software.  Efforts were made to reduce errors, but some may persist.

## 2021-08-17 RX ORDER — PANTOPRAZOLE SODIUM 40 MG/1
TABLET, DELAYED RELEASE ORAL
Qty: 60 TABLET | Refills: 10 | Status: SHIPPED
Start: 2021-08-17 | End: 2022-07-26 | Stop reason: SDUPTHER

## 2021-09-20 RX ORDER — BUPROPION HYDROCHLORIDE 300 MG/1
TABLET ORAL
Qty: 30 TABLET | Refills: 5 | Status: SHIPPED
Start: 2021-09-20 | End: 2021-10-06 | Stop reason: SDUPTHER

## 2021-09-20 NOTE — TELEPHONE ENCOUNTER
Last Appointment:  7/12/2021  Future Appointments   Date Time Provider Glen Nievesi   10/6/2021  2:00 PM Marita Mishra MD [facility]   10/21/2021  8:40 AM Jorden Runner,  W [facility] [address]

## 2021-09-28 DIAGNOSIS — J44.9 CHRONIC OBSTRUCTIVE PULMONARY DISEASE, UNSPECIFIED COPD TYPE (HCC): ICD-10-CM

## 2021-09-28 RX ORDER — ALBUTEROL SULFATE 90 UG/1
AEROSOL, METERED RESPIRATORY (INHALATION)
Qty: 18 G | Refills: 10 | Status: SHIPPED
Start: 2021-09-28 | End: 2021-12-23 | Stop reason: SDUPTHER

## 2021-10-06 ENCOUNTER — TELEPHONE (OUTPATIENT)
Dept: SURGERY | Age: 61
End: 2021-10-06

## 2021-10-06 ENCOUNTER — OFFICE VISIT (OUTPATIENT)
Dept: SURGERY | Age: 61
End: 2021-10-06
Payer: COMMERCIAL

## 2021-10-06 VITALS
BODY MASS INDEX: 18.61 KG/M2 | SYSTOLIC BLOOD PRESSURE: 130 MMHG | DIASTOLIC BLOOD PRESSURE: 80 MMHG | WEIGHT: 109 LBS | HEIGHT: 64 IN | HEART RATE: 114 BPM

## 2021-10-06 DIAGNOSIS — R39.15 URINARY URGENCY: ICD-10-CM

## 2021-10-06 DIAGNOSIS — Z86.010 HISTORY OF COLON POLYPS: Primary | ICD-10-CM

## 2021-10-06 PROCEDURE — 3017F COLORECTAL CA SCREEN DOC REV: CPT | Performed by: SURGERY

## 2021-10-06 PROCEDURE — G8484 FLU IMMUNIZE NO ADMIN: HCPCS | Performed by: SURGERY

## 2021-10-06 PROCEDURE — 4004F PT TOBACCO SCREEN RCVD TLK: CPT | Performed by: SURGERY

## 2021-10-06 PROCEDURE — G8420 CALC BMI NORM PARAMETERS: HCPCS | Performed by: SURGERY

## 2021-10-06 PROCEDURE — G8427 DOCREV CUR MEDS BY ELIG CLIN: HCPCS | Performed by: SURGERY

## 2021-10-06 PROCEDURE — 99212 OFFICE O/P EST SF 10 MIN: CPT | Performed by: SURGERY

## 2021-10-06 RX ORDER — BUPROPION HYDROCHLORIDE 300 MG/1
TABLET ORAL
Qty: 30 TABLET | Refills: 5 | Status: SHIPPED
Start: 2021-10-06 | End: 2022-03-25 | Stop reason: SDUPTHER

## 2021-10-06 RX ORDER — OXYBUTYNIN CHLORIDE 10 MG/1
10 TABLET, EXTENDED RELEASE ORAL DAILY
Qty: 30 TABLET | Refills: 5 | Status: SHIPPED
Start: 2021-10-06 | End: 2021-12-17 | Stop reason: SDUPTHER

## 2021-10-06 NOTE — TELEPHONE ENCOUNTER
Prior Authorization Form:      DEMOGRAPHICS:                     Patient Name:  Luis Hein  Patient :  1960            Insurance:  Payor: [address] [place] [address]  [address] / Plan: [facility] [address] / Product Type: *No Product type* /   Insurance ID Number:    Payor/Plan Subscr  Sex Relation Sub.  Ins. ID Effective Group Num   1. SILVIO TRAN* DEMETRIO ARNETT 1960 Female Self [id number]                                    [address]         DIAGNOSIS & PROCEDURE:                       Procedure/Operation: colonoscopy            CPT Code: 73848    Diagnosis:  H/o colon polyps    ICD10 Code: Z86.010    Location:  [facility]    Surgeon:  Michele Olivarez INFORMATION:                          Date: 21  Time: 1200             Anesthesia:  MAC/TIVA                                                       Status:  Outpatient        Special Comments:         Electronically signed by Kenyetta Rollins MA on 10/6/2021 at 4:27 PM

## 2021-10-06 NOTE — TELEPHONE ENCOUNTER
Last Appointment:  7/12/2021  Future Appointments   Date Time Provider Glen Alvarado   10/6/2021  2:10 PM Harrison Cabello MD [facility]   10/21/2021  8:40 AM Robby Coates  W [facility] [address]

## 2021-10-06 NOTE — PROGRESS NOTES
Progress Note - Follow up    Patient's Name/Date of Birth: Jeffery Costa / 1960    Date: 10/6/2021    PCP: Aysha Gomez MD    Referring Physician:   Mariam Addison  [phone]    Chief Complaint   Patient presents with    3 Month Follow-Up    Colonoscopy       HPI:    The patient said she has been feeling ok. No n/v/d/c/abdominal pain. Patient's medications, allergies, past medical, surgical, social and family histories were reviewed and updated as appropriate. Review of Systems  Constitutional: negative  Respiratory: negative  Cardiovascular: negative  Gastrointestinal: as in hpi  Genitourinary:negative  Integument/breast: negative    Physical Exam:  /80   Pulse 114   Ht 5' 3.5\" (1.613 m)   Wt 109 lb (49.4 kg)   LMP  (LMP Unknown)   BMI 19.01 kg/m²   General appearance: alert, cooperative and in no acute distress. Lungs: normal work of breathing  Heart: regular rate  Abdomen: soft, nontender, nondistended  Musculoskeletal: symmetrical without edema. Skin: normal       Impression/Plan:  64y.o. year old female with with history of colon polyps    I will set the patient up for a colonoscopy, possible biopsy, possible polypectomy. I explained the risks including but not limited to bleeding, perforation leading to possible surgery, or infection. The benefits, alternatives, and potential complications associated with the above procedure to be performed and transfusions when applicable with the patient/responsible person prior to the procedure. I discussed the risk of bowel peroration, postoperative bleeding, post-polypectomy syndrome, as well as the possibility of needing emergency surgery or another colonoscopy. All of the patient's questions were answered. The patient understands and agrees to the procedure.            Electronically by Chikis Patterson MD, General Surgery  on 10/6/2021 at 2:49 PM      Send copy of H&P to PCP, Aysha Gomez MD and referring physician, Michelle Murray,*      10/6/2021

## 2021-10-12 RX ORDER — SODIUM CHLORIDE 9 MG/ML
INJECTION, SOLUTION INTRAVENOUS CONTINUOUS
Status: CANCELLED | OUTPATIENT
Start: 2021-10-12

## 2021-11-11 ENCOUNTER — TELEPHONE (OUTPATIENT)
Dept: SURGERY | Age: 61
End: 2021-11-11

## 2021-11-11 NOTE — TELEPHONE ENCOUNTER
Patient called to cancel her colonoscopy for 11/12/21. States she cannot do right now and will call back to reschedule.    FYI

## 2021-11-19 DIAGNOSIS — J44.9 CHRONIC OBSTRUCTIVE PULMONARY DISEASE, UNSPECIFIED COPD TYPE (HCC): ICD-10-CM

## 2021-11-19 RX ORDER — TIOTROPIUM BROMIDE 18 UG/1
CAPSULE ORAL; RESPIRATORY (INHALATION)
Qty: 30 CAPSULE | Refills: 5 | Status: SHIPPED
Start: 2021-11-19 | End: 2021-12-17 | Stop reason: SDUPTHER

## 2021-11-19 NOTE — TELEPHONE ENCOUNTER
Last Appointment:  7/12/2021  Future Appointments   Date Time Provider Glen Alvarado   12/23/2021  3:00 PM True Mccormick  W [facility] [address]

## 2021-12-09 RX ORDER — ADHESIVE BANDAGE 3/4"
BANDAGE TOPICAL
Qty: 15 ML | Refills: 10 | Status: SHIPPED
Start: 2021-12-09 | End: 2022-03-25

## 2021-12-09 NOTE — TELEPHONE ENCOUNTER
Last Appointment:  7/12/2021  Future Appointments   Date Time Provider Glen Alvarado   12/23/2021  3:00 PM Vianney Dudley  W [address] [place] [address]

## 2021-12-17 DIAGNOSIS — J44.9 CHRONIC OBSTRUCTIVE PULMONARY DISEASE, UNSPECIFIED COPD TYPE (HCC): ICD-10-CM

## 2021-12-17 DIAGNOSIS — R39.15 URINARY URGENCY: ICD-10-CM

## 2021-12-17 RX ORDER — TIOTROPIUM BROMIDE 18 UG/1
CAPSULE ORAL; RESPIRATORY (INHALATION)
Qty: 30 CAPSULE | Refills: 5 | Status: SHIPPED
Start: 2021-12-17 | End: 2022-06-09

## 2021-12-17 RX ORDER — OXYBUTYNIN CHLORIDE 10 MG/1
10 TABLET, EXTENDED RELEASE ORAL DAILY
Qty: 30 TABLET | Refills: 5 | Status: SHIPPED
Start: 2021-12-17 | End: 2022-06-09

## 2021-12-17 NOTE — TELEPHONE ENCOUNTER
Last Appointment:  7/12/2021  Future Appointments   Date Time Provider Glen Alvarado   12/23/2021  3:00 PM Leanna Lewis  W [facility] [address]

## 2021-12-23 ENCOUNTER — OFFICE VISIT (OUTPATIENT)
Dept: FAMILY MEDICINE CLINIC | Age: 61
End: 2021-12-23
Payer: COMMERCIAL

## 2021-12-23 VITALS
TEMPERATURE: 98.5 F | HEIGHT: 63 IN | WEIGHT: 107 LBS | OXYGEN SATURATION: 95 % | SYSTOLIC BLOOD PRESSURE: 148 MMHG | BODY MASS INDEX: 18.96 KG/M2 | HEART RATE: 98 BPM | DIASTOLIC BLOOD PRESSURE: 86 MMHG

## 2021-12-23 DIAGNOSIS — Z23 NEED FOR INFLUENZA VACCINATION: ICD-10-CM

## 2021-12-23 DIAGNOSIS — I10 ESSENTIAL HYPERTENSION: Primary | ICD-10-CM

## 2021-12-23 DIAGNOSIS — J44.9 CHRONIC OBSTRUCTIVE PULMONARY DISEASE, UNSPECIFIED COPD TYPE (HCC): ICD-10-CM

## 2021-12-23 PROCEDURE — G8427 DOCREV CUR MEDS BY ELIG CLIN: HCPCS | Performed by: FAMILY MEDICINE

## 2021-12-23 PROCEDURE — G8926 SPIRO NO PERF OR DOC: HCPCS | Performed by: FAMILY MEDICINE

## 2021-12-23 PROCEDURE — 90674 CCIIV4 VAC NO PRSV 0.5 ML IM: CPT | Performed by: FAMILY MEDICINE

## 2021-12-23 PROCEDURE — G8420 CALC BMI NORM PARAMETERS: HCPCS | Performed by: FAMILY MEDICINE

## 2021-12-23 PROCEDURE — G8482 FLU IMMUNIZE ORDER/ADMIN: HCPCS | Performed by: FAMILY MEDICINE

## 2021-12-23 PROCEDURE — 99214 OFFICE O/P EST MOD 30 MIN: CPT | Performed by: FAMILY MEDICINE

## 2021-12-23 PROCEDURE — 3017F COLORECTAL CA SCREEN DOC REV: CPT | Performed by: FAMILY MEDICINE

## 2021-12-23 PROCEDURE — 3023F SPIROM DOC REV: CPT | Performed by: FAMILY MEDICINE

## 2021-12-23 PROCEDURE — 4004F PT TOBACCO SCREEN RCVD TLK: CPT | Performed by: FAMILY MEDICINE

## 2021-12-23 RX ORDER — ALBUTEROL SULFATE 90 UG/1
AEROSOL, METERED RESPIRATORY (INHALATION)
Qty: 18 G | Refills: 10 | Status: SHIPPED
Start: 2021-12-23 | End: 2022-08-08 | Stop reason: SDUPTHER

## 2021-12-23 RX ORDER — LISINOPRIL 20 MG/1
TABLET ORAL
Qty: 90 TABLET | Refills: 1 | Status: SHIPPED
Start: 2021-12-23 | End: 2022-03-25 | Stop reason: SDUPTHER

## 2021-12-23 NOTE — PROGRESS NOTES
[facility]  Office Progress Note - Dr. Marilee Mcdowell  12/23/21    CC:   Chief Complaint   Patient presents with    Hypertension        BP (!) 148/86 (Site: Left Upper Arm, Position: Sitting, Cuff Size: Medium Adult)   Pulse 98   Temp 98.5 °F (36.9 °C) (Temporal)   Ht 5' 3\" (1.6 m)   Wt 107 lb (48.5 kg)   LMP  (LMP Unknown)   SpO2 95%   BMI 18.95 kg/m²   Wt Readings from Last 3 Encounters:   12/23/21 107 lb (48.5 kg)   10/06/21 109 lb (49.4 kg)   07/12/21 108 lb (49 kg)       HPI:   Hypertension  Follow-up  Blood pressure is borderline controlled today. Slightly improved on recheck. BP Readings from Last 3 Encounters:   12/23/21 (!) 148/86   10/06/21 130/80   07/12/21 (!) 146/86   She just recently started taking 20 mg of lisinopril again. First dose was today. There is a medication mixup. She was taking an old prescription. Patient continues medications regularly. Compliance is good. Denies CP, sob, abd pain, headaches, vision changes, dizziness, hypotensive symptoms. No side effects from medications noted. Increased stressors recently. Ex- is living with her. He had a stroke. He is not improving. Breathing poor. Close to running out of inhalers. COPD. Spiriva, Advair, albuterol  Continues smoking.  _________________________________________________________    Assessment / Plan  Rebecca Escobedo was seen today for hypertension. Diagnoses and all orders for this visit:    Essential hypertension  -     lisinopril (PRINIVIL;ZESTRIL) 20 MG tablet; TAKE (1) TABLET BY MOUTH DAILY  Not at goal but just recently started 20 mg lisinopril again. Continue same. Recheck blood pressure next year.     Need for influenza vaccination  -     INFLUENZA, MDCK QUADV, 2 YRS AND OLDER, IM, PF, PREFILL SYR OR SDV, 0.5ML (FLUCELVAX QUADV, PF)    Chronic obstructive pulmonary disease, unspecified COPD type (HCC)  -     albuterol sulfate  (90 Base) MCG/ACT inhaler; INHALE TWO (2) PUFFS BY MOUTH EVERY 6 HOURS AS NEEDED FOR SHORTNESS OF BREATH OR WHEEZING  Smoking cessation. Continue Spiriva and Advair. Return in about 3 months (around 3/23/2022). or as scheduled. Patient counseled to follow up sooner or seek more acute care if symptoms worsening or not improving according to plan. Electronically signed by Azar Mcghee MD on 12/23/2021    _________________________________________________________  Current Outpatient Medications on File Prior to Visit   Medication Sig Dispense Refill    tiotropium (SPIRIVA HANDIHALER) 18 MCG inhalation capsule INHALE THE CONTENTS OF 1 CAPSULE INTO THE LUNGS DAILY VIA HANDIHALER 30 capsule 5    oxybutynin (DITROPAN XL) 10 MG extended release tablet Take 1 tablet by mouth daily For bladder spasms 30 tablet 5    fluticasone-salmeterol (ADVAIR DISKUS) 250-50 MCG/DOSE AEPB Inhale 1 puff into the lungs 2 times daily Rinse mouth after use. 1 each 5    EAR DROPS 6.5 % otic solution INSTILL 5 DROPS INTO LEFT EAR AS NEEDED FOR EARWAX 15 mL 10    buPROPion (WELLBUTRIN XL) 300 MG extended release tablet TAKE 1 TABLET BY MOUTH EVERY MORNING 30 tablet 5    pantoprazole (PROTONIX) 40 MG tablet TAKE ONE (1) TABLET BY MOUTH TWICE DAILY (Patient taking differently: Take 40 mg by mouth daily ) 60 tablet 10     No current facility-administered medications on file prior to visit. Patient Active Problem List   Diagnosis Code    Epigastric abdominal pain R10.13    Gastroesophageal reflux disease without esophagitis K21.9    Polyp of colon, adenomatous D12.6    Chronic obstructive pulmonary disease ([facility] [address] J44.9    Depression F32. A    Essential hypertension I10    Alcohol use disorder, moderate, dependence ([facility]) F10.20    Suspected COVID-19 virus infection Z20.822    Acute exacerbation of chronic obstructive pulmonary disease (COPD) ([facility]) J44.1    Bilious vomiting with nausea R11.14     _________________________________________________________  Past Medical History:   Diagnosis Date    Bladder spasm     COPD (chronic obstructive pulmonary disease) (HCC)     Diarrhea     GERD (gastroesophageal reflux disease)     Hypertension        Family History   Problem Relation Age of Onset    Cancer Mother     High Blood Pressure Father     Arthritis Father     Cancer Father        Past Surgical History:   Procedure Laterality Date    APPENDECTOMY  1995    CHOLECYSTECTOMY, LAPAROSCOPIC N/A 6/23/2021    LAPAROSCOPIC ROBOTIC XI ASSISTED CHOLECYSTECTOMY performed by Cristian Herrera MD at [facility] COLONOSCOPY  8/19/15    ENDOSCOPY, COLON, DIAGNOSTIC  8/19/15    UPPER GASTROINTESTINAL ENDOSCOPY N/A 6/11/2021    EGD BIOPSY performed by Cristian Herrera MD at [facility] ENDOSCOPY       Social History     Tobacco Use    Smoking status: Current Every Day Smoker     Packs/day: 1.00     Years: 45.00     Pack years: 45.00     Types: Cigarettes    Smokeless tobacco: Never Used   Vaping Use    Vaping Use: Never used   Substance Use Topics    Alcohol use: Yes     Alcohol/week: 70.0 standard drinks     Types: 70 Cans of beer per week     Comment: 12 cans daily    Drug use: Yes     Types: Marijuana Beau Gonzalez     Comment: had marijuana 6/21/21       Chart reviewed and updated where appropriate for PMH, Fam, and Soc Hx.  _________________________________________________________  ROS: POSITIVE: As in the HPI. Otherwise Pertinent negatives are negative.    __________________________________________________________  Physical Exam   Constitutional:    She is oriented to person, place, and time. She appears well-developed and well-nourished. Eyes:    Conjunctivae are normal.    Pupils are equal, round, and reactive to light. EOMI. Neck:    Normal range of motion. No thyromegaly or nodules noted. No bruit. No LAD. Cardiovascular:    Normal rate, regular rhythm and normal heart sounds. No murmur. No gallop and no friction rub.    Pulmonary/Chest:    Effort increased and breath sounds decreased. No wheezes. No rales or rhonchi. Abdominal:    Soft. Bowel sounds are normal.    No distension. No tenderness. Skin:    Skin is warm and dry. No rashes, No lesions. Psychiatric:    She has a normal mood and affect. Normal groom and dress. No SI or HI.   ________________________________________________________    This note may have been created using dictation software.  Efforts were made to reduce errors, but some may persist.

## 2022-03-25 ENCOUNTER — OFFICE VISIT (OUTPATIENT)
Dept: FAMILY MEDICINE CLINIC | Age: 62
End: 2022-03-25
Payer: COMMERCIAL

## 2022-03-25 VITALS
SYSTOLIC BLOOD PRESSURE: 144 MMHG | HEIGHT: 63 IN | WEIGHT: 107 LBS | BODY MASS INDEX: 18.96 KG/M2 | DIASTOLIC BLOOD PRESSURE: 90 MMHG | OXYGEN SATURATION: 95 % | TEMPERATURE: 98.6 F | HEART RATE: 108 BPM

## 2022-03-25 DIAGNOSIS — J44.9 CHRONIC OBSTRUCTIVE PULMONARY DISEASE, UNSPECIFIED COPD TYPE (HCC): ICD-10-CM

## 2022-03-25 DIAGNOSIS — I10 ESSENTIAL HYPERTENSION: ICD-10-CM

## 2022-03-25 LAB
ALBUMIN SERPL-MCNC: 4.9 G/DL (ref 3.5–5.2)
ALP BLD-CCNC: 75 U/L (ref 35–104)
ALT SERPL-CCNC: 22 U/L (ref 0–32)
ANION GAP SERPL CALCULATED.3IONS-SCNC: 17 MMOL/L (ref 7–16)
AST SERPL-CCNC: 31 U/L (ref 0–31)
BASOPHILS ABSOLUTE: 0.07 E9/L (ref 0–0.2)
BASOPHILS RELATIVE PERCENT: 0.8 % (ref 0–2)
BILIRUB SERPL-MCNC: 0.3 MG/DL (ref 0–1.2)
BUN BLDV-MCNC: 4 MG/DL (ref 6–23)
CALCIUM SERPL-MCNC: 10 MG/DL (ref 8.6–10.2)
CHLORIDE BLD-SCNC: 92 MMOL/L (ref 98–107)
CHOLESTEROL, TOTAL: 170 MG/DL (ref 0–199)
CO2: 21 MMOL/L (ref 22–29)
CREAT SERPL-MCNC: 0.4 MG/DL (ref 0.5–1)
EOSINOPHILS ABSOLUTE: 0.1 E9/L (ref 0.05–0.5)
EOSINOPHILS RELATIVE PERCENT: 1.1 % (ref 0–6)
GFR AFRICAN AMERICAN: >60
GFR NON-AFRICAN AMERICAN: >60 ML/MIN/1.73
GLUCOSE BLD-MCNC: 93 MG/DL (ref 74–99)
HCT VFR BLD CALC: 42.9 % (ref 34–48)
HDLC SERPL-MCNC: 92 MG/DL
HEMOGLOBIN: 14.7 G/DL (ref 11.5–15.5)
IMMATURE GRANULOCYTES #: 0.05 E9/L
IMMATURE GRANULOCYTES %: 0.5 % (ref 0–5)
LDL CHOLESTEROL CALCULATED: 67 MG/DL (ref 0–99)
LYMPHOCYTES ABSOLUTE: 2.18 E9/L (ref 1.5–4)
LYMPHOCYTES RELATIVE PERCENT: 23.6 % (ref 20–42)
MCH RBC QN AUTO: 33.6 PG (ref 26–35)
MCHC RBC AUTO-ENTMCNC: 34.3 % (ref 32–34.5)
MCV RBC AUTO: 97.9 FL (ref 80–99.9)
MONOCYTES ABSOLUTE: 0.72 E9/L (ref 0.1–0.95)
MONOCYTES RELATIVE PERCENT: 7.8 % (ref 2–12)
NEUTROPHILS ABSOLUTE: 6.1 E9/L (ref 1.8–7.3)
NEUTROPHILS RELATIVE PERCENT: 66.2 % (ref 43–80)
PDW BLD-RTO: 13.3 FL (ref 11.5–15)
PLATELET # BLD: 288 E9/L (ref 130–450)
PMV BLD AUTO: 9.9 FL (ref 7–12)
POTASSIUM SERPL-SCNC: 4.7 MMOL/L (ref 3.5–5)
RBC # BLD: 4.38 E12/L (ref 3.5–5.5)
SODIUM BLD-SCNC: 130 MMOL/L (ref 132–146)
TOTAL PROTEIN: 7.7 G/DL (ref 6.4–8.3)
TRIGL SERPL-MCNC: 55 MG/DL (ref 0–149)
VLDLC SERPL CALC-MCNC: 11 MG/DL
WBC # BLD: 9.2 E9/L (ref 4.5–11.5)

## 2022-03-25 PROCEDURE — G8420 CALC BMI NORM PARAMETERS: HCPCS | Performed by: FAMILY MEDICINE

## 2022-03-25 PROCEDURE — 3023F SPIROM DOC REV: CPT | Performed by: FAMILY MEDICINE

## 2022-03-25 PROCEDURE — G8427 DOCREV CUR MEDS BY ELIG CLIN: HCPCS | Performed by: FAMILY MEDICINE

## 2022-03-25 PROCEDURE — 4004F PT TOBACCO SCREEN RCVD TLK: CPT | Performed by: FAMILY MEDICINE

## 2022-03-25 PROCEDURE — 99214 OFFICE O/P EST MOD 30 MIN: CPT | Performed by: FAMILY MEDICINE

## 2022-03-25 PROCEDURE — G8482 FLU IMMUNIZE ORDER/ADMIN: HCPCS | Performed by: FAMILY MEDICINE

## 2022-03-25 PROCEDURE — 3017F COLORECTAL CA SCREEN DOC REV: CPT | Performed by: FAMILY MEDICINE

## 2022-03-25 RX ORDER — ALBUTEROL SULFATE 2.5 MG/3ML
2.5 SOLUTION RESPIRATORY (INHALATION) EVERY 6 HOURS PRN
Qty: 120 EACH | Refills: 3 | Status: SHIPPED
Start: 2022-03-25 | End: 2022-07-07

## 2022-03-25 RX ORDER — PAROXETINE 10 MG/1
10 TABLET, FILM COATED ORAL DAILY
Qty: 90 TABLET | Refills: 1 | Status: SHIPPED
Start: 2022-03-25 | End: 2022-08-23

## 2022-03-25 RX ORDER — BUPROPION HYDROCHLORIDE 300 MG/1
TABLET ORAL
Qty: 30 TABLET | Refills: 5 | Status: SHIPPED | OUTPATIENT
Start: 2022-03-25

## 2022-03-25 RX ORDER — LISINOPRIL 20 MG/1
TABLET ORAL
Qty: 90 TABLET | Refills: 1 | Status: SHIPPED
Start: 2022-03-25 | End: 2022-06-24

## 2022-03-25 ASSESSMENT — PATIENT HEALTH QUESTIONNAIRE - PHQ9
7. TROUBLE CONCENTRATING ON THINGS, SUCH AS READING THE NEWSPAPER OR WATCHING TELEVISION: 3
SUM OF ALL RESPONSES TO PHQ9 QUESTIONS 1 & 2: 6
SUM OF ALL RESPONSES TO PHQ QUESTIONS 1-9: 18
SUM OF ALL RESPONSES TO PHQ QUESTIONS 1-9: 18
10. IF YOU CHECKED OFF ANY PROBLEMS, HOW DIFFICULT HAVE THESE PROBLEMS MADE IT FOR YOU TO DO YOUR WORK, TAKE CARE OF THINGS AT HOME, OR GET ALONG WITH OTHER PEOPLE: 2
4. FEELING TIRED OR HAVING LITTLE ENERGY: 3
3. TROUBLE FALLING OR STAYING ASLEEP: 3
8. MOVING OR SPEAKING SO SLOWLY THAT OTHER PEOPLE COULD HAVE NOTICED. OR THE OPPOSITE, BEING SO FIGETY OR RESTLESS THAT YOU HAVE BEEN MOVING AROUND A LOT MORE THAN USUAL: 0
SUM OF ALL RESPONSES TO PHQ QUESTIONS 1-9: 18
9. THOUGHTS THAT YOU WOULD BE BETTER OFF DEAD, OR OF HURTING YOURSELF: 0
1. LITTLE INTEREST OR PLEASURE IN DOING THINGS: 3
6. FEELING BAD ABOUT YOURSELF - OR THAT YOU ARE A FAILURE OR HAVE LET YOURSELF OR YOUR FAMILY DOWN: 0
SUM OF ALL RESPONSES TO PHQ QUESTIONS 1-9: 18
5. POOR APPETITE OR OVEREATING: 3
2. FEELING DOWN, DEPRESSED OR HOPELESS: 3
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
## 2022-04-19 ENCOUNTER — TELEPHONE (OUTPATIENT)
Dept: FAMILY MEDICINE CLINIC | Age: 62
End: 2022-04-19
Payer: COMMERCIAL

## 2022-04-19 DIAGNOSIS — Z87.891 PERSONAL HISTORY OF TOBACCO USE: Primary | ICD-10-CM

## 2022-04-19 PROCEDURE — G0296 VISIT TO DETERM LDCT ELIG: HCPCS | Performed by: FAMILY MEDICINE

## 2022-04-19 NOTE — TELEPHONE ENCOUNTER
I left a message for patient to call the office back in regards to see if she would like Dr. Drena Brittle to order another CT lung screening again.

## 2022-04-19 NOTE — TELEPHONE ENCOUNTER
Low Dose CT (LDCT) Lung Screening criteria met:     Age 50-77(Medicare) or 50-80 ([facility])   Pack year smoking >20   Still smoking or less than 15 year since quit   No sign or symptoms of lung cancer   > 11 months since last LDCT     Risks and benefits of lung cancer screening with LDCT scans discussed:    Significance of positive screen - False-positive LDCT results often occur. 95% of all positive results do not lead to a diagnosis of cancer. Usually further imaging can resolve most false-positive results; however, some patients may require invasive procedures. Over diagnosis risk - 10% to 12% of screen-detected lung cancer cases are over diagnosed--that is, the cancer would not have been detected in the patient's lifetime without the screening. Need for follow up screens annually to continue lung cancer screening effectiveness     Risks associated with radiation from annual LDCT- Radiation exposure is about the same as for a mammogram, which is about 1/3 of the annual background radiation exposure from everyday life. Starting screening at age 54 is not likely to increase cancer risk from radiation exposure. Patients with comorbidities resulting in life expectancy of < 10 years, or that would preclude treatment of an abnormality identified on CT, should not be screened due to lack of benefit.     To obtain maximal benefit from this screening, smoking cessation and long-term abstinence from smoking is critical

## 2022-05-10 ENCOUNTER — TELEPHONE (OUTPATIENT)
Dept: FAMILY MEDICINE CLINIC | Age: 62
End: 2022-05-10

## 2022-05-10 NOTE — TELEPHONE ENCOUNTER
[facility] - [phone]          Fax - [phone]      Beatriz Lozada is asking for office notes that have some mention of the Low Dose CT Scan to be faxed to her. All she needs is that is states somewhere in the notes that you will be ordering a low dose CT scan. [employer] required this before they will approve it.

## 2022-05-12 ENCOUNTER — TELEPHONE (OUTPATIENT)
Dept: FAMILY MEDICINE CLINIC | Age: 62
End: 2022-05-12

## 2022-05-12 NOTE — TELEPHONE ENCOUNTER
Carolann from [facility] radiology called. She said the authorization for the CT Lung is still pending and she will need to be rescheduled and wanted to let you know. She is going to call the patient and let her know as well.

## 2022-06-02 DIAGNOSIS — R91.8 LUNG MASS: Primary | ICD-10-CM

## 2022-06-09 DIAGNOSIS — J44.9 CHRONIC OBSTRUCTIVE PULMONARY DISEASE, UNSPECIFIED COPD TYPE (HCC): ICD-10-CM

## 2022-06-09 DIAGNOSIS — R39.15 URINARY URGENCY: ICD-10-CM

## 2022-06-09 RX ORDER — OXYBUTYNIN CHLORIDE 10 MG/1
10 TABLET, EXTENDED RELEASE ORAL DAILY
Qty: 30 TABLET | Refills: 10 | Status: SHIPPED | OUTPATIENT
Start: 2022-06-09

## 2022-06-09 RX ORDER — TIOTROPIUM BROMIDE 18 UG/1
CAPSULE ORAL; RESPIRATORY (INHALATION)
Qty: 30 CAPSULE | Refills: 10 | Status: SHIPPED | OUTPATIENT
Start: 2022-06-09

## 2022-06-09 RX ORDER — FLUTICASONE PROPIONATE AND SALMETEROL 50; 250 UG/1; UG/1
POWDER RESPIRATORY (INHALATION)
Qty: 60 EACH | Refills: 10 | Status: SHIPPED | OUTPATIENT
Start: 2022-06-09

## 2022-06-09 NOTE — TELEPHONE ENCOUNTER
Last Appointment:  3/25/2022  Future Appointments   Date Time Provider Glen Alvarado   6/24/2022  9:00 AM Mortimer Safe,  [address] [place] [address]

## 2022-06-09 NOTE — TELEPHONE ENCOUNTER
Last Appointment:  3/25/2022  Future Appointments   Date Time Provider Glen Alvarado   6/24/2022  9:00 AM Gary Avitia  [address] [place] [address]

## 2022-06-24 ENCOUNTER — TELEPHONE (OUTPATIENT)
Dept: FAMILY MEDICINE CLINIC | Age: 62
End: 2022-06-24

## 2022-06-24 ENCOUNTER — OFFICE VISIT (OUTPATIENT)
Dept: FAMILY MEDICINE CLINIC | Age: 62
End: 2022-06-24
Payer: COMMERCIAL

## 2022-06-24 VITALS
OXYGEN SATURATION: 93 % | WEIGHT: 106 LBS | DIASTOLIC BLOOD PRESSURE: 100 MMHG | SYSTOLIC BLOOD PRESSURE: 150 MMHG | HEART RATE: 100 BPM | BODY MASS INDEX: 18.78 KG/M2 | TEMPERATURE: 98.4 F | HEIGHT: 63 IN

## 2022-06-24 DIAGNOSIS — J44.9 CHRONIC OBSTRUCTIVE PULMONARY DISEASE, UNSPECIFIED COPD TYPE (HCC): ICD-10-CM

## 2022-06-24 DIAGNOSIS — R22.32 MASS OF LEFT WRIST: ICD-10-CM

## 2022-06-24 DIAGNOSIS — R91.8 MASS OF UPPER LOBE OF LUNG: Primary | ICD-10-CM

## 2022-06-24 DIAGNOSIS — B37.0 THRUSH: ICD-10-CM

## 2022-06-24 DIAGNOSIS — I10 ESSENTIAL HYPERTENSION: ICD-10-CM

## 2022-06-24 PROCEDURE — 4004F PT TOBACCO SCREEN RCVD TLK: CPT | Performed by: FAMILY MEDICINE

## 2022-06-24 PROCEDURE — 3017F COLORECTAL CA SCREEN DOC REV: CPT | Performed by: FAMILY MEDICINE

## 2022-06-24 PROCEDURE — 99214 OFFICE O/P EST MOD 30 MIN: CPT | Performed by: FAMILY MEDICINE

## 2022-06-24 PROCEDURE — G8420 CALC BMI NORM PARAMETERS: HCPCS | Performed by: FAMILY MEDICINE

## 2022-06-24 PROCEDURE — 3023F SPIROM DOC REV: CPT | Performed by: FAMILY MEDICINE

## 2022-06-24 PROCEDURE — G8427 DOCREV CUR MEDS BY ELIG CLIN: HCPCS | Performed by: FAMILY MEDICINE

## 2022-06-24 RX ORDER — CLOTRIMAZOLE 10 MG/1
10 LOZENGE ORAL; TOPICAL
Qty: 50 TABLET | Refills: 0 | Status: SHIPPED | OUTPATIENT
Start: 2022-06-24 | End: 2022-07-04

## 2022-06-24 RX ORDER — LISINOPRIL AND HYDROCHLOROTHIAZIDE 20; 12.5 MG/1; MG/1
1 TABLET ORAL DAILY
Qty: 30 TABLET | Refills: 5 | Status: SHIPPED | OUTPATIENT
Start: 2022-06-24

## 2022-06-24 NOTE — TELEPHONE ENCOUNTER
Please call patient and advise her that I spoke with pulmonologist, Dr. Telly Cortés. He took a look at her CT scan and recommends that we get updated pulmonary function tests and a PET scan to help next steps with decision making. I ordered those tests today and we are working to get her scheduled as soon as possible at [place]. If she has any questions, please let me know.

## 2022-06-24 NOTE — PROGRESS NOTES
[facility]  Office Progress Note - Dr. Rukhsana Cho  6/24/22    CC:   Chief Complaint   Patient presents with    Mass     Lumps on left wrist.     Thrush     Possible oral thrush. BP (!) 150/100 (Site: Right Upper Arm, Position: Sitting, Cuff Size: Medium Adult)   Pulse 100   Temp 98.4 °F (36.9 °C) (Temporal)   Ht 5' 3\" (1.6 m)   Wt 106 lb (48.1 kg)   LMP  (LMP Unknown)   SpO2 93%   BMI 18.78 kg/m²   Wt Readings from Last 3 Encounters:   06/24/22 106 lb (48.1 kg)   03/25/22 107 lb (48.5 kg)   12/23/21 107 lb (48.5 kg)       HPI:   Hypertension  Follow-up  Blood pressure is not controlled today. BP Readings from Last 3 Encounters:   06/24/22 (!) 150/100   03/25/22 (!) 144/90   12/23/21 (!) 148/86     Patient continues medications regularly. Compliance is good. Denies CP, sob, abd pain, headaches, vision changes, dizziness, hypotensive symptoms. No side effects from medications noted. Lung mass  Tal[place]  1.9 cm on CT lung screening from last month. Reportedly not present on the previous year screening. No other new nodules seen. Spoke with pulmonologist today Dr. Lucian Chamberlain who reviewed the images. The lesion is spiculated and concerning appearing. It is also deep and may not be amenable to fine-needle biopsy or EBUS. Advised to get full PFTs and PET scanning for next steps prior to surgical intervention which may be planned. She is a long-term smoker with an estimated 45-pack-year history of smoking. For physician awareness, patient is an alcoholic and drinks about 12 cans of beer daily. She reports thrush recently. She has noticed a film in her mouth and it has been sore. Exacerbated by salty and acidic foods. On exam she has mild thrush on her tongue only. She does use an inhaled corticosteroid. She does report this problem is better than previous. She also notes a lump on her left wrist.  She has been aware of it for about a month.   On exam she has a superficial soft compressible chain of masses, which I think may be a vein. This is less firm than a typical ganglion cyst.  She does not have any swelling in that arm and pulses are palpable brachial and radial.  No other vein enlargement.  _________________________________________________________    Assessment / Plan  Rosie Almaraz was seen today for mass and thrush. Diagnoses and all orders for this visit:    Mass of upper lobe of lung  -     Full PFT Study With Bronchodilator; Future  -     PET CT WHOLE BODY; Future  Suspicious mass in the lung apex. Spoke with pulmonology directly today. She was scheduled for consultation, but attempting to get that moved up further pending the above results. Given the location of the lesion, lobectomy may be most appropriate per discussion today. Essential hypertension  Not at goal.  Had hydrochlorothiazide. Regular alcohol consumption likely contributing, as is stress levels. Chronic obstructive pulmonary disease, unspecified COPD type (HCC)  Stable on Spiriva, Advair, albuterol. She was able to get nebulizer prescribed last month. Lung function/opacity may influence decisions for this lung mass. Update PFTs. Thrush  Clotrimazole atrocious. Mass of left wrist  .  I think this is likely a venous formation. There is no pulse here. There is no pain here. As such we will monitor. She was advised that if she notices her left arm is swelling at all, she is to let me know so that we can get an ultrasound. There is no swelling or pain on exam today and I have low suspicion for DVT. Other orders  -     lisinopril-hydroCHLOROthiazide (PRINZIDE;ZESTORETIC) 20-12.5 MG per tablet; Take 1 tablet by mouth daily  -     clotrimazole (MYCELEX) 10 MG dipak; Take 1 tablet by mouth 5 times daily for 10 day. Return in about 2 months (around 8/24/2022). or as scheduled.    Patient counseled to follow up sooner or seek more acute care if symptoms worsening or not improving according to plan. Electronically signed by Marija Biggs MD on 6/24/2022    _________________________________________________________  Current Outpatient Medications on File Prior to Visit   Medication Sig Dispense Refill    ADVAIR DISKUS 250-50 MCG/ACT AEPB diskus inhaler INHALE ONE PUFF BY MOUTH TWO TIMES PER DAY (MORNING AND EVENING APPROXIMATELY 12 HOURS APART) *RINSE MOUTH & GARGLE AFTER USE 60 each 10    oxybutynin (DITROPAN-XL) 10 mg extended release tablet TAKE 1 TABLET BY MOUTH DAILY FOR BLADDER SPASMS 30 tablet 10    SPIRIVA HANDIHALER 18 MCG inhalation capsule INHALE THE CONTENTS OF 1 CAPSULE INTO THE LUNGS DAILY VIA HANDIHALER 30 capsule 10    buPROPion (WELLBUTRIN XL) 300 MG extended release tablet TAKE 1 TABLET BY MOUTH EVERY MORNING 30 tablet 5    PARoxetine (PAXIL) 10 MG tablet Take 1 tablet by mouth daily 90 tablet 1    albuterol (PROVENTIL) (2.5 MG/3ML) 0.083% nebulizer solution Take 3 mLs by nebulization every 6 hours as needed for Wheezing 120 each 3    albuterol sulfate  (90 Base) MCG/ACT inhaler INHALE TWO (2) PUFFS BY MOUTH EVERY 6 HOURS AS NEEDED FOR SHORTNESS OF BREATH OR WHEEZING 18 g 10    pantoprazole (PROTONIX) 40 MG tablet TAKE ONE (1) TABLET BY MOUTH TWICE DAILY (Patient taking differently: Take 40 mg by mouth daily ) 60 tablet 10     No current facility-administered medications on file prior to visit. Patient Active Problem List   Diagnosis Code    Epigastric abdominal pain R10.13    Gastroesophageal reflux disease without esophagitis K21.9    Polyp of colon, adenomatous D12.6    Chronic obstructive pulmonary disease ([address] J44.9    Depression F32. A    Essential hypertension I10    Alcohol use disorder, moderate, dependence (HCC) F10.20    Suspected COVID-19 virus infection Z20.822    Acute exacerbation of chronic obstructive pulmonary disease (COPD) ([facility]) J44.1    Bilious vomiting with nausea R11.14 _________________________________________________________  Past Medical History:   Diagnosis Date    Bladder spasm     COPD (chronic obstructive pulmonary disease) (HCC)     Diarrhea     GERD (gastroesophageal reflux disease)     Hypertension        Family History   Problem Relation Age of Onset    Cancer Mother     High Blood Pressure Father     Arthritis Father     Cancer Father        Past Surgical History:   Procedure Laterality Date    APPENDECTOMY  1995    CHOLECYSTECTOMY, LAPAROSCOPIC N/A 6/23/2021    LAPAROSCOPIC ROBOTIC XI ASSISTED CHOLECYSTECTOMY performed by Tae [facility]queen [facility] at [facility] COLONOSCOPY  8/19/15    ENDOSCOPY, COLON, DIAGNOSTIC  8/19/15    UPPER GASTROINTESTINAL ENDOSCOPY N/A 6/11/2021    EGD BIOPSY performed by [facility]ae Mcqueen MD at [facility] ENDOSCOPY       Social History     Tobacco Use    Smoking status: Current Every Day Smoker     Packs/day: 1.00     Years: 45.00     Pack years: 45.00     Types: Cigarettes    Smokeless tobacco: Never Used   Vaping Use    Vaping Use: Never used   Substance Use Topics    Alcohol use: Yes     Alcohol/week: 70.0 standard drinks     Types: 70 Cans of beer per week     Comment: 12 cans daily    Drug use: Yes     Types: Marijuana Elfida Mason)     Comment: had marijuana 6/21/21       Chart reviewed and updated where appropriate for PMH, Fam, and Soc Hx.  _________________________________________________________  ROS: POSITIVE: As in the HPI. Otherwise Pertinent negatives are negative.    __________________________________________________________  Physical Exam   Constitutional:    She is oriented to person, place, and time. She appears well-developed and well-nourished. Eyes:    Conjunctivae are normal.    Pupils are equal, round, and reactive to light. EOMI. Neck:    Normal range of motion. No thyromegaly or nodules noted. No bruit. No LAD. Cardiovascular:    Normal rate, regular rhythm and normal heart sounds. No murmur. No gallop and no friction rub. Pulmonary/Chest:    Effort normal and breath sounds normal.    Scattered wheezes. No rales or rhonchi. Abdominal:    Soft. Thin. Bowel sounds are normal.    No distension. No tenderness. Musculoskeletal:    Normal range of motion. No joint swelling noted. No peripheral edema. Skin:    Skin is warm and dry. No rashes, No lesions. Psychiatric:    She has a anxious. Mood and affect. Normal groom and dress. No SI or HI.   ________________________________________________________    This note may have been created using dictation software.  Efforts were made to reduce errors, but some may persist.

## 2022-06-24 NOTE — TELEPHONE ENCOUNTER
Patient advised and verbalized understanding. 06/24/22  I faxed orders, demographics, insurance and clinical documentation to [facility] scheduling Dept. Fx# [phone] Marked urgent. Also called scheduling dept to try to schedule. No answer, left detailed message requesting urgent scheduling of tests. (Ph#  [phone])    I asked pt to please let us know is she does not hear from [facility] in the next week or so. I did advise her (and Juliana) that Doctor would like to see these tests done in the next couple of weeks.

## 2022-06-27 ENCOUNTER — TELEPHONE (OUTPATIENT)
Dept: FAMILY MEDICINE CLINIC | Age: 62
End: 2022-06-27

## 2022-06-27 DIAGNOSIS — R93.89 ABNORMAL CT OF THE CHEST: ICD-10-CM

## 2022-06-27 DIAGNOSIS — R91.8 LUNG MASS: Primary | ICD-10-CM

## 2022-06-27 NOTE — TELEPHONE ENCOUNTER
[facility] Scheduling states that PET \"Whole body\" needs to be \"Skull to Thigh\"  Please change order. As of now, they can schedule pt for 07/12  Does this suffice? If it must be done sooner than 07/12, it must be ordered as STAT and we will need to obtain Auth from insurance.

## 2022-06-27 NOTE — TELEPHONE ENCOUNTER
Called number on back of insurance card. Ph# [phone]   Was on hold for 55 minutes. Phone rang like it was being picked up, then no one answered on other line. 5:11 pm currently. I am off the next 2 days, this is a STAT order for possible lung cancer. This will need worked on brining to [employer] (team leads) attention.

## 2022-06-28 NOTE — TELEPHONE ENCOUNTER
Called [address] [place] [address] and spoke to a nurse reviewer. PET scan got approved and its good from 6/28/22 to 7/28/22. Auth# [id number].

## 2022-07-06 ENCOUNTER — HOSPITAL ENCOUNTER (OUTPATIENT)
Dept: PET IMAGING | Age: 62
Discharge: HOME | End: 2022-07-08
Payer: COMMERCIAL

## 2022-07-06 DIAGNOSIS — J44.9 CHRONIC OBSTRUCTIVE PULMONARY DISEASE, UNSPECIFIED COPD TYPE (HCC): ICD-10-CM

## 2022-07-06 DIAGNOSIS — R93.89 ABNORMAL CT OF THE CHEST: ICD-10-CM

## 2022-07-06 DIAGNOSIS — R91.8 LUNG MASS: ICD-10-CM

## 2022-07-06 LAB — METER GLUCOSE: 94 MG/DL (ref 74–99)

## 2022-07-06 PROCEDURE — 78815 PET IMAGE W/CT SKULL-THIGH: CPT

## 2022-07-06 PROCEDURE — 82962 GLUCOSE BLOOD TEST: CPT

## 2022-07-06 PROCEDURE — A9552 F18 FDG: HCPCS | Performed by: RADIOLOGY

## 2022-07-06 PROCEDURE — 3430000000 HC RX DIAGNOSTIC RADIOPHARMACEUTICAL: Performed by: RADIOLOGY

## 2022-07-06 RX ORDER — FLUDEOXYGLUCOSE F 18 200 MCI/ML
15 INJECTION, SOLUTION INTRAVENOUS
Status: COMPLETED | OUTPATIENT
Start: 2022-07-06 | End: 2022-07-06

## 2022-07-06 RX ADMIN — FLUDEOXYGLUCOSE F 18 15 MILLICURIE: 200 INJECTION, SOLUTION INTRAVENOUS at 14:02

## 2022-07-07 RX ORDER — ALBUTEROL SULFATE 2.5 MG/3ML
SOLUTION RESPIRATORY (INHALATION)
Qty: 360 ML | Refills: 10 | Status: SHIPPED | OUTPATIENT
Start: 2022-07-07

## 2022-07-08 LAB
DLCO %PRED: NORMAL %
DLCO PRED: NORMAL
DLCO/VA %PRED: NORMAL
DLCO/VA PRED: NORMAL
DLCO/VA: NORMAL
DLCO: NORMAL
EXPIRATORY TIME-POST: NORMAL
EXPIRATORY TIME: NORMAL
FEF 25-75% %CHNG: NORMAL
FEF 25-75% %PRED-POST: NORMAL
FEF 25-75% %PRED-PRE: NORMAL
FEF 25-75% PRED: NORMAL
FEF 25-75%-POST: NORMAL
FEF 25-75%-PRE: NORMAL
FEV1 %PRED-POST: NORMAL %
FEV1 %PRED-PRE: NORMAL %
FEV1 PRED: NORMAL
FEV1-POST: NORMAL
FEV1-PRE: NORMAL
FEV1/FVC %PRED-POST: NORMAL
FEV1/FVC %PRED-PRE: NORMAL
FEV1/FVC PRED: NORMAL
FEV1/FVC-POST: NORMAL %
FEV1/FVC-PRE: NORMAL %
FVC %PRED-POST: NORMAL
FVC %PRED-PRE: NORMAL
FVC PRED: NORMAL
FVC-POST: NORMAL
FVC-PRE: NORMAL
GAW %PRED: NORMAL
GAW PRED: NORMAL
GAW: NORMAL
IC %PRED: NORMAL
IC PRED: NORMAL
IC: NORMAL
MEP: NORMAL
MIP: NORMAL
MVV %PRED-PRE: NORMAL
MVV PRED: NORMAL
MVV-PRE: NORMAL
PEF %PRED-POST: NORMAL
PEF %PRED-PRE: NORMAL
PEF PRED: NORMAL
PEF%CHNG: NORMAL
PEF-POST: NORMAL
PEF-PRE: NORMAL
RAW %PRED: NORMAL
RAW PRED: NORMAL
RAW: NORMAL
RV %PRED: NORMAL
RV PRED: NORMAL
RV: NORMAL
SVC %PRED: NORMAL
SVC PRED: NORMAL
SVC: NORMAL
TLC %PRED: NORMAL %
TLC PRED: NORMAL
TLC: NORMAL
VA %PRED: NORMAL
VA PRED: NORMAL
VA: NORMAL
VTG %PRED: NORMAL
VTG PRED: NORMAL
VTG: NORMAL

## 2022-07-12 DIAGNOSIS — R91.8 LUNG MASS: Primary | ICD-10-CM

## 2022-07-12 DIAGNOSIS — R94.2 ABNORMAL PET SCAN OF LUNG: ICD-10-CM

## 2022-07-12 DIAGNOSIS — R93.89 ABNORMAL CT OF THE CHEST: ICD-10-CM

## 2022-07-15 ENCOUNTER — TELEPHONE (OUTPATIENT)
Dept: FAMILY MEDICINE CLINIC | Age: 62
End: 2022-07-15

## 2022-07-15 NOTE — TELEPHONE ENCOUNTER
Hi Dr Leia Ambriz is not taking new patients at this time  Could  you please put in new referral    Thank You

## 2022-07-18 DIAGNOSIS — R91.8 MASS OF UPPER LOBE OF LUNG: ICD-10-CM

## 2022-07-18 NOTE — TELEPHONE ENCOUNTER
Abi Crane, I personally spoke to Dr. Ana Cobb and he said that he would see this patient. Please communicate that with his office staff. He will see people who have lung cancer, he sent a letter out about that a few months ago which is why I personally communicated with him about this patient. Please get her scheduled as soon as possible and talk with his office staff to coordinate this.

## 2022-07-26 RX ORDER — PANTOPRAZOLE SODIUM 40 MG/1
40 TABLET, DELAYED RELEASE ORAL DAILY
Qty: 30 TABLET | Refills: 2 | OUTPATIENT
Start: 2022-07-26

## 2022-08-05 DIAGNOSIS — J44.9 CHRONIC OBSTRUCTIVE PULMONARY DISEASE, UNSPECIFIED COPD TYPE (HCC): ICD-10-CM

## 2022-08-19 ENCOUNTER — OFFICE VISIT (OUTPATIENT)
Dept: FAMILY MEDICINE CLINIC | Age: 62
End: 2022-08-19
Payer: COMMERCIAL

## 2022-08-19 VITALS
TEMPERATURE: 98.2 F | HEART RATE: 102 BPM | DIASTOLIC BLOOD PRESSURE: 92 MMHG | OXYGEN SATURATION: 95 % | SYSTOLIC BLOOD PRESSURE: 136 MMHG | HEIGHT: 63 IN | BODY MASS INDEX: 19.49 KG/M2 | WEIGHT: 110 LBS

## 2022-08-19 DIAGNOSIS — R06.02 SOBOE (SHORTNESS OF BREATH ON EXERTION): ICD-10-CM

## 2022-08-19 DIAGNOSIS — J44.9 CHRONIC OBSTRUCTIVE PULMONARY DISEASE, UNSPECIFIED COPD TYPE (HCC): ICD-10-CM

## 2022-08-19 DIAGNOSIS — F10.20 UNCOMPLICATED ALCOHOL DEPENDENCE (HCC): ICD-10-CM

## 2022-08-19 DIAGNOSIS — Z01.811 PRE-OP CHEST EXAM: Primary | ICD-10-CM

## 2022-08-19 PROCEDURE — 4004F PT TOBACCO SCREEN RCVD TLK: CPT | Performed by: FAMILY MEDICINE

## 2022-08-19 PROCEDURE — G8427 DOCREV CUR MEDS BY ELIG CLIN: HCPCS | Performed by: FAMILY MEDICINE

## 2022-08-19 PROCEDURE — 3023F SPIROM DOC REV: CPT | Performed by: FAMILY MEDICINE

## 2022-08-19 PROCEDURE — 3017F COLORECTAL CA SCREEN DOC REV: CPT | Performed by: FAMILY MEDICINE

## 2022-08-19 PROCEDURE — 99214 OFFICE O/P EST MOD 30 MIN: CPT | Performed by: FAMILY MEDICINE

## 2022-08-19 PROCEDURE — 93000 ELECTROCARDIOGRAM COMPLETE: CPT | Performed by: FAMILY MEDICINE

## 2022-08-19 PROCEDURE — G8420 CALC BMI NORM PARAMETERS: HCPCS | Performed by: FAMILY MEDICINE

## 2022-08-19 NOTE — Clinical Note
Stress test needs scheduled at [facility] for pre-op testing. Please let me know if unable to complete in next 7-10 days.

## 2022-08-19 NOTE — PROGRESS NOTES
[facility]  Office Progress Note - Dr. Lisa Mansfield  8/19/22    CC:   Chief Complaint   Patient presents with    Pre-op Exam     Lung mass removal - no surgery date yet. BP (!) 136/92   Pulse (!) 102   Temp 98.2 °F (36.8 °C) (Temporal)   Ht 5' 3\" (1.6 m)   Wt 110 lb (49.9 kg)   LMP  (LMP Unknown)   SpO2 95%   BMI 19.49 kg/m²   Wt Readings from Last 3 Encounters:   08/19/22 110 lb (49.9 kg)   07/27/22 106 lb (48.1 kg)   06/24/22 106 lb (48.1 kg)       HPI: Lung mass suspicious for cancer in high risk patient with heavy smoking history     Pre-op for lobe resection / excision of mass. She has bene feeling at baseline state of health with SOBOE. She is unable to climb a flight of stairs without stopping to rest due to SOB. She has been having some dizziness with this recently. Presyncopal feeling. BP has occ been on the lower side at home. Elevated today, did take meds. Otherwise no CP or angina. Does feel heaviness on chest when SOB but she relates this to COPD. NKDA. No Hx of bleeding problems or blood clots. No Hx of problems with anesthesia. She drinks 10-12 cans of beer daily. CIWA Protocol will need to be in place for hospitalization after surgery. _________________________________________________________    Assessment / Santi Robe was seen today for pre-op exam.    Diagnoses and all orders for this visit:    Pre-op chest exam  -     EKG 12 lead; Future  -     EKG 12 lead  -     NM Cardiac Stress Test Nuclear Imaging; Future  -     Cardiac Stress Test - w/Pharm; Future        -     regadenoson (LEXISCAN) injection 0.4 mg  Will obtain stress testing prior to surgery given high risk surgery, low daily METs, and recent symptoms. If Stress test ok, then can proceed with surgery given lung cancer risk is high and the procedure warrants the risk. Patient understands and is willing to proceed.      Chronic obstructive pulmonary disease, unspecified COPD type ([address]  Stable but regularly symptomatic. PFTS recently completed. Pulm and CTS feel risk of the lung mass warrants intervention and that PFTs are acceptable enough to proceed. SOBOE (shortness of breath on exertion)  -     NM Cardiac Stress Test Nuclear Imaging; Future  -     Cardiac Stress Test - w/Pharm; Future  Not reaching at least 4 METs daily, with SOBOE and Presyncope. Lab Results   Component Value Date    [facility] 67 03/25/2022   Long term smoker. Would like Stress before upcoming surgery. Alcohol Dependence  Will need CIWA Protocol and recommend daily ativan vs Klonopin while in hospital.     PRN or as scheduled or following surgery. Patient counseled to follow up sooner or seek more acute care if symptoms worsening or not improving according to plan. Electronically signed by Abdullahi Neri MD on 8/21/2022    _________________________________________________________  Current Outpatient Medications on File Prior to Visit   Medication Sig Dispense Refill    VENTOLIN  (90 Base) MCG/ACT inhaler INHALE 2 PUFFS BY MOUTH EVERY 6 HOURS AS NEEDED FOR SHORTNESS OF BREATH OR WHEEZING 18 g 10    pantoprazole (PROTONIX) 40 MG tablet Take 1 tablet by mouth in the morning.  30 tablet 2    albuterol (PROVENTIL) (2.5 MG/3ML) 0.083% nebulizer solution INHALE 1 VIAL VIA NEBULIZER EVERY 6 HOURS AS NEEDED FOR WHEEZING 360 mL 10    lisinopril-hydroCHLOROthiazide (PRINZIDE;ZESTORETIC) 20-12.5 MG per tablet Take 1 tablet by mouth daily 30 tablet 5    ADVAIR DISKUS 250-50 MCG/ACT AEPB diskus inhaler INHALE ONE PUFF BY MOUTH TWO TIMES PER DAY (MORNING AND EVENING APPROXIMATELY 12 HOURS APART) *RINSE MOUTH & GARGLE AFTER USE 60 each 10    oxybutynin (DITROPAN-XL) 10 mg extended release tablet TAKE 1 TABLET BY MOUTH DAILY FOR BLADDER SPASMS 30 tablet 10    SPIRIVA HANDIHALER 18 MCG inhalation capsule INHALE THE CONTENTS OF 1 CAPSULE INTO THE LUNGS DAILY VIA HANDIHALER 30 capsule 10    buPROPion (WELLBUTRIN XL) 300 MG extended release tablet TAKE 1 TABLET BY MOUTH EVERY MORNING 30 tablet 5    PARoxetine (PAXIL) 10 MG tablet Take 1 tablet by mouth daily 90 tablet 1     No current facility-administered medications on file prior to visit. Patient Active Problem List   Diagnosis Code    Epigastric abdominal pain R10.13    Gastroesophageal reflux disease without esophagitis K21.9    Polyp of colon, adenomatous D12.6    Chronic obstructive pulmonary disease ([address] J44.9    Depression F32. A    Essential hypertension I10    Alcohol use disorder, moderate, dependence ([facility]) F10.20    Suspected COVID-19 virus infection Z20.822    Acute exacerbation of chronic obstructive pulmonary disease (COPD) ([facility]) J44.1    Bilious vomiting with nausea R11.14     _________________________________________________________  Past Medical History:   Diagnosis Date    Bladder spasm     COPD (chronic obstructive pulmonary disease) ([facility])     Diarrhea     GERD (gastroesophageal reflux disease)     Hypertension        Family History   Problem Relation Age of Onset    Cancer Mother     High Blood Pressure Father     Arthritis Father     Cancer Father        Past Surgical History:   Procedure Laterality Date    APPENDECTOMY  1995    CHOLECYSTECTOMY, LAPAROSCOPIC N/A 6/23/2021    LAPAROSCOPIC ROBOTIC XI ASSISTED CHOLECYSTECTOMY performed by Juan Carlos Soriano MD at [address] [place] [address]  8/19/15    ENDOSCOPY, COLON, DIAGNOSTIC  8/19/15    UPPER GASTROINTESTINAL ENDOSCOPY N/A 6/11/2021    EGD BIOPSY performed by Juan Carlos Soriano MD at [facility] ENDOSCOPY       Social History     Tobacco Use    Smoking status: Every Day     Packs/day: 1.00     Years: 45.00     Pack years: 45.00     Types: Cigarettes    Smokeless tobacco: Never   Vaping Use    Vaping Use: Never used   Substance Use Topics    Alcohol use: Yes     Alcohol/week: 70.0 standard drinks     Types: 70 Cans of beer per week     Comment: 12 cans daily    Drug use:  Yes Types: Marijuana (Weed)     Comment: had marijuana 6/21/21       Chart reviewed and updated where appropriate for PMH, Fam, and Soc Hx.  _________________________________________________________  ROS: POSITIVE: As in the HPI. Otherwise Pertinent negatives are negative.    __________________________________________________________  Physical Exam   Constitutional:    She is oriented to person, place, and time. She appears well-developed and well-nourished. Eyes:    Conjunctivae are normal.    Pupils are equal, round, and reactive to light. EOMI. Neck:    Normal range of motion. No thyromegaly or nodules noted. No bruit. No LAD. Cardiovascular:    Normal rate, regular rhythm with rare ectopy and normal heart sounds. No murmur. No gallop and no friction rub. Pulmonary/Chest:    Effort inc and breath sounds normal.    No wheezes. No rales or rhonchi. Abdominal:    Soft. Bowel sounds are normal.    No distension. No tenderness. Musculoskeletal:    Normal range of motion. No joint swelling noted. No peripheral edema. Skin:    Skin is warm and dry. No rashes, No lesions. Psychiatric:    She has an anxious mood and affect. Normal groom and dress. No SI or HI.   ________________________________________________________    This note may have been created using dictation software.  Efforts were made to reduce errors, but some may persist.

## 2022-08-23 RX ORDER — PAROXETINE 10 MG/1
10 TABLET, FILM COATED ORAL DAILY
Qty: 30 TABLET | Refills: 10 | Status: SHIPPED | OUTPATIENT
Start: 2022-08-23

## 2022-09-01 ENCOUNTER — TELEPHONE (OUTPATIENT)
Dept: FAMILY MEDICINE CLINIC | Age: 62
End: 2022-09-01

## 2022-09-01 NOTE — TELEPHONE ENCOUNTER
Has patient been scheduled for stress test yet? She is waiting on surgery for potential lung cancer and I have not heard anything about stress test timeline. I am trying to clear her for surgery. I believe this was being done at [place].

## 2022-09-02 ENCOUNTER — TELEPHONE (OUTPATIENT)
Dept: FAMILY MEDICINE CLINIC | Age: 62
End: 2022-09-02

## 2022-09-02 NOTE — TELEPHONE ENCOUNTER
Called [facility] scheduling to see if pt has been scheduled for stress test. [phone] they do not open until 8:30am. Will need to try again later. Called pt to see if she has been scheduled. No answer, no VM.

## 2022-09-02 NOTE — TELEPHONE ENCOUNTER
[facility] called back because they received a fax requesting STAT testing. Patient was scheduled but Candi Gonzalez is still pending (they are working on). Latonya Henao is currently on \"rebook\" status. Once the Candi Carlos is obtained they will call the patient back to reschedule and try to work her and schedule soon as possible. If we need STAT testing, our office will need to obtain the authorization.

## 2022-09-02 NOTE — TELEPHONE ENCOUNTER
Stress test was ordered at routine. Sent order to [facility] with \"asap/urgent\" note attached, not stat. Insurance Candi Prey is pending.

## 2022-09-06 NOTE — TELEPHONE ENCOUNTER
Can you please check with [facility] / Kamilah Shah department on when they think this is going to get approved or not - said was still pending on Friday, I know we had the Niall Taylor weekend. Needs approved today or tomorrow - any longer is inappropriate.

## 2022-09-15 LAB
LV EF: 77 %
LVEF MODALITY: NORMAL

## 2022-09-16 DIAGNOSIS — Z01.811 PRE-OP CHEST EXAM: ICD-10-CM

## 2022-09-16 DIAGNOSIS — R06.02 SOBOE (SHORTNESS OF BREATH ON EXERTION): ICD-10-CM

## 2022-09-16 NOTE — TELEPHONE ENCOUNTER
Stress test results reviewed and normal.   Please advise Dr. Rasheed Arthur office that patient cleared to proceed with surgery. Clearance form will follow next week. Needs scheduled soon for surgery. Stress test scheduling for clearance was a prolonged process at [place].

## 2022-09-16 NOTE — TELEPHONE ENCOUNTER
Cannot wait that long. His staff needs to ask him about her.   If he cannot fit her in, then she'll need to see alternate cardiothoracic surgeon

## 2022-09-16 NOTE — TELEPHONE ENCOUNTER
Called Dr. Johnathan Caceres office - [phone]    Dr. Dannielle Vee is off until the end of this month, taking another to 2 weeks off in Oct.  They are booked until middle of Nov.  Still must continue with Dr. Lyle Joyce, correct?

## 2022-09-23 ENCOUNTER — TELEPHONE (OUTPATIENT)
Dept: FAMILY MEDICINE CLINIC | Age: 62
End: 2022-09-23

## 2022-09-23 NOTE — TELEPHONE ENCOUNTER
----- Message from Elvia Messer sent at 9/23/2022  3:16 PM EDT -----  Subject: Message to Provider    QUESTIONS  Information for Provider? Patient has been expecting a call back from the   tests taken at [address] [place] [address]. Patient has a temporary phone number to   call her at as her phone is currently out of commission. Patient is   getting a new phone attached to that number but it is taking some time. The temporary phone number has been added #[phone] as work phone. Please send all calls to this number until she calls and has it removed.  ---------------------------------------------------------------------------  --------------  [id number] [facility]  [phone]; OK to leave message on voicemail  ---------------------------------------------------------------------------  --------------  SCRIPT ANSWERS  Relationship to Patient?  Self

## 2022-09-23 NOTE — TELEPHONE ENCOUNTER
I have been waiting to hear back from vascular surgeons office. I believe in a telephone encounter earlier this week I said that Dr. Tia Schirmer nurse/ needs to talk to him to see if he will see the patient before he goes on vacation. If he will not, then she needs to see another surgeon because the timeline they gave her is too long. It has already taken longer than expected to get a stress test done to clear her for surgery because of scheduling and insurance issues at [facility].  So I am waiting to hear back if he will fit her in or not.

## 2022-09-23 NOTE — TELEPHONE ENCOUNTER
Tried to call patient but her VM is not set up on [phone]. I do see results back on her nuclear stress test. Do you have anything you wish to report to the patient about the results from 9/15/22?

## 2022-09-26 NOTE — TELEPHONE ENCOUNTER
Spoke w/ Rock Heard at Dr Robbins's office. He will not be able to work patient in sooner for her surgery. He has one surgery date and it is completely full. They do not have another surgeon at the office that can do the surgery. Do you want to refer elsewhere? Rock Herad at Dr Stanton office would like an update once you decide.

## 2022-09-26 NOTE — TELEPHONE ENCOUNTER
Called Dr Robbins's office, they are not able to work her into his schedule sooner. Message added to the original encounter. Will await response from Dr Sara Sotomayor.

## 2022-09-27 NOTE — TELEPHONE ENCOUNTER
Stress test was normal.  I am nearly certain I responded to this yesterday but I am fine with patient seeing whoever can competently perform the surgery. If Dr. Martina Iqbal has a partner who can do the procedure I am fine with that as long as the patient is. Please arrange as soon as possible for consultation if needed.

## 2022-09-30 DIAGNOSIS — R93.89 ABNORMAL CT OF THE CHEST: ICD-10-CM

## 2022-09-30 DIAGNOSIS — R91.8 MASS OF UPPER LOBE OF LUNG: Primary | ICD-10-CM

## 2022-09-30 NOTE — TELEPHONE ENCOUNTER
Tried to call pt at number given below. [phone]   No answer, no VM. Dr. Robles Patella unable to do her surgery, no one else in his office can either. Dr. Sanju Pettit has now placed an urgent referral to Dr. Nakul Hendricks, to see if he can do this surgery. He have been trying to reach pt to see if she is agreeable to see Dr. Nakul Hendricks. Referral made anyhow due to urgency. Pt will need to let us know if she is not agreeable to this.

## 2022-10-03 ENCOUNTER — PATIENT MESSAGE (OUTPATIENT)
Dept: FAMILY MEDICINE CLINIC | Age: 62
End: 2022-10-03

## 2022-10-03 ENCOUNTER — TELEPHONE (OUTPATIENT)
Dept: FAMILY MEDICINE CLINIC | Age: 62
End: 2022-10-03

## 2022-10-03 NOTE — TELEPHONE ENCOUNTER
We have not been able to reach patient by phone. Pt states she is [facility] major phone issues right now\". Per [employer] message with Dr. Yasmeen Burgos says she will check her MyChart daily. I tried to call Dr. Myrtle Chan office to let them know this. It is now after hours. Someone may need to try to call Dr. Myrtle Chan again and let then know tomorrow. I will also message An Agarwal and give her Dr. Myrtle Chan phone number so she perhaps call and get herself scheduled.

## 2022-10-03 NOTE — TELEPHONE ENCOUNTER
Dr. Eric Jacobs has responded and informed pt of this in a [employer] message on 09/30/22. The message does show \"read\" by patient on same day. I called Dr. Ladonna Haney office and left a message with Earlyne Pantoja, bringing attention to this urgent referral and requested that they call us back with a timeline on when patient can be seen per Dr. Gabrielle Puir request.    [facility] call from Dr. Ladonna Haney office.

## 2022-10-03 NOTE — TELEPHONE ENCOUNTER
Dea Eng - Dr TO [facility] Office                 [phone]         Dea Eng called to let you know that the earliest they can get Codey Boyd in with Dr Kelley Pierce is next Tuesday. He only works on Tuesdays and is booked tomorrow. She is contacting the patient now to get her scheduled.

## 2022-10-03 NOTE — TELEPHONE ENCOUNTER
Per separate encounter, Dr. Melchor Ramirez can see pt as soon as tomorrow. [facility] aware.   Closing this encounter

## 2022-10-04 NOTE — TELEPHONE ENCOUNTER
Timo House called back from Dr Montanez Res office. He was only in until 11 today and his schedule was full. They have Kenji scheduled for 10/11/22 at 11:00. They will send her a [employer] message to update. According to this message Dr Alem Howard was informed yesterday that she will be seen next week and was in agreement.

## 2022-10-10 ASSESSMENT — ENCOUNTER SYMPTOMS
ABDOMINAL DISTENTION: 0
SHORTNESS OF BREATH: 0
NAUSEA: 0
CHEST TIGHTNESS: 0

## 2022-10-10 NOTE — PROGRESS NOTES
Subjective:      Patient ID: Tom Berry is a 58 y.o. female. HPI: Ms. Michelle Brannon is a 58year old female with past medical history of COPD, HTN, depression and alcohol use disorder who presents to the office to discuss surgical options regarding a lung mass. She underwent routine lung screening CT on 5/29 that showed suspicious lung nodule im the right upper lobe. She was referred to Dr. Brandy Mason office and she underwent a PET and PFTs. PET showed irregular spiculated 1.4 cm right upper lobe pulmonary nodule demonstrates intermediate level activity, SUV maximum 1.5. PFTs with FEV1 70% of predicted and DLCO 53% of predicted. She currently denies any CP, SOB, URBANO, fever, chills or syncope. Review of Systems   Constitutional:  Negative for activity change, chills and fever. Respiratory:  Negative for chest tightness and shortness of breath. Cardiovascular:  Negative for chest pain and palpitations. Gastrointestinal:  Negative for abdominal distention and nausea. Neurological:  Negative for dizziness, syncope and light-headedness. Psychiatric/Behavioral:  The patient is not nervous/anxious. Objective:   Physical Exam  Constitutional:       General: She is not in acute distress. Cardiovascular:      Rate and Rhythm: Normal rate and regular rhythm. Heart sounds: Normal heart sounds. Pulmonary:      Effort: No respiratory distress. Breath sounds: Normal breath sounds. Abdominal:      General: There is no distension. Palpations: Abdomen is soft. Musculoskeletal:         General: Normal range of motion. Skin:     General: Skin is warm and dry. Neurological:      Mental Status: She is alert and oriented to person, place, and time.    Psychiatric:         Mood and Affect: Mood normal.         Behavior: Behavior normal.       Assessment:      RUL nodule, RLL nodule      Plan:      Her PET SUV is only 1.5 and the nodule is in the dead center of the lobe- her only option for resection is lobectomy and that would be without a diagnosis and with a low SUV. Her CT and PET are 1 months old- will repeat her CT- if enlarging I will feel more comfortable essentially blindly taking out her RUL. I will see her back after her repeat CT.

## 2022-10-11 ENCOUNTER — INITIAL CONSULT (OUTPATIENT)
Dept: CARDIOTHORACIC SURGERY | Age: 62
End: 2022-10-11
Payer: COMMERCIAL

## 2022-10-11 VITALS
HEIGHT: 63 IN | WEIGHT: 102 LBS | BODY MASS INDEX: 18.07 KG/M2 | DIASTOLIC BLOOD PRESSURE: 86 MMHG | HEART RATE: 102 BPM | SYSTOLIC BLOOD PRESSURE: 149 MMHG

## 2022-10-11 DIAGNOSIS — R91.8 MASS OF UPPER LOBE OF RIGHT LUNG: Primary | ICD-10-CM

## 2022-10-11 DIAGNOSIS — R91.1 NODULE OF RIGHT LUNG: Primary | ICD-10-CM

## 2022-10-11 PROCEDURE — 4004F PT TOBACCO SCREEN RCVD TLK: CPT | Performed by: THORACIC SURGERY (CARDIOTHORACIC VASCULAR SURGERY)

## 2022-10-11 PROCEDURE — G8484 FLU IMMUNIZE NO ADMIN: HCPCS | Performed by: THORACIC SURGERY (CARDIOTHORACIC VASCULAR SURGERY)

## 2022-10-11 PROCEDURE — 3017F COLORECTAL CA SCREEN DOC REV: CPT | Performed by: THORACIC SURGERY (CARDIOTHORACIC VASCULAR SURGERY)

## 2022-10-11 PROCEDURE — G8427 DOCREV CUR MEDS BY ELIG CLIN: HCPCS | Performed by: THORACIC SURGERY (CARDIOTHORACIC VASCULAR SURGERY)

## 2022-10-11 PROCEDURE — 99204 OFFICE O/P NEW MOD 45 MIN: CPT | Performed by: THORACIC SURGERY (CARDIOTHORACIC VASCULAR SURGERY)

## 2022-10-11 PROCEDURE — G8419 CALC BMI OUT NRM PARAM NOF/U: HCPCS | Performed by: THORACIC SURGERY (CARDIOTHORACIC VASCULAR SURGERY)

## 2022-10-13 ENCOUNTER — TELEPHONE (OUTPATIENT)
Dept: CARDIOTHORACIC SURGERY | Age: 62
End: 2022-10-13

## 2022-10-21 RX ORDER — ADHESIVE BANDAGE 3/4"
BANDAGE TOPICAL
Qty: 15 ML | Refills: 0 | Status: SHIPPED
Start: 2022-10-21 | End: 2022-11-22

## 2022-10-21 NOTE — TELEPHONE ENCOUNTER
Last Appointment:  8/19/2022  Future Appointments   Date Time Provider Glen Nievesi   11/3/2022  3:00 PM SEB CT 1-BD NESS CT [facility] Radiolog   11/8/2022 10:00 AM Carlos Nathan MD CARDIO SURG [facility]

## 2022-10-24 RX ORDER — ADHESIVE BANDAGE 3/4"
BANDAGE TOPICAL
Qty: 15 ML | Refills: 10 | OUTPATIENT
Start: 2022-10-24

## 2022-11-03 ENCOUNTER — HOSPITAL ENCOUNTER (OUTPATIENT)
Dept: CT IMAGING | Age: 62
Discharge: HOME | End: 2022-11-05
Payer: COMMERCIAL

## 2022-11-03 DIAGNOSIS — R91.8 MASS OF UPPER LOBE OF RIGHT LUNG: ICD-10-CM

## 2022-11-03 PROCEDURE — 71250 CT THORAX DX C-: CPT

## 2022-11-07 ASSESSMENT — ENCOUNTER SYMPTOMS
CHEST TIGHTNESS: 0
NAUSEA: 0
SHORTNESS OF BREATH: 0
ABDOMINAL DISTENTION: 0

## 2022-11-07 NOTE — PROGRESS NOTES
Subjective:      Patient ID: Alejandro Villanueva is a 58 y.o. female. HPI: Ms. Quincy Fulton is a 58year old female with past medical history of COPD, HTN, depression and alcohol use disorder who returns to the office to discuss recent CT results and possible need for resection. She underwent routine lung screening CT on 5/29 that showed suspicious lung nodule im the right upper lobe. She was referred to Dr. Alicja Berrios office and she underwent a PET and PFTs. PET showed irregular spiculated 1.4 cm right upper lobe pulmonary nodule demonstrates intermediate level activity, SUV maximum 1.5. PFTs with FEV1 70% of predicted and DLCO 53% of predicted. She currently denies any CP, SOB, URBANO, fever, chills or syncope. After last visit, she underwent repeat chest CT to assess for progression from previous before subjecting her to lobectomy which would be her only option based on location of the nodule. Review of Systems   Constitutional:  Negative for activity change, chills and fever. Respiratory:  Negative for chest tightness and shortness of breath. Cardiovascular:  Negative for chest pain and palpitations. Gastrointestinal:  Negative for abdominal distention and nausea. Neurological:  Negative for dizziness, syncope and light-headedness. Psychiatric/Behavioral:  The patient is not nervous/anxious. Objective:   Physical Exam  Constitutional:       General: She is not in acute distress. Cardiovascular:      Rate and Rhythm: Normal rate and regular rhythm. Heart sounds: Normal heart sounds. Pulmonary:      Effort: No respiratory distress. Breath sounds: Normal breath sounds. Abdominal:      General: There is no distension. Palpations: Abdomen is soft. Musculoskeletal:         General: Normal range of motion. Skin:     General: Skin is warm and dry. Neurological:      Mental Status: She is alert and oriented to person, place, and time.    Psychiatric:         Mood and Affect: Mood normal.         Behavior: Behavior normal.       Assessment:      RUL nodule, RLL nodule      Plan:      Repeat CT looks the same. I will see her back in 3 months with another CT. If the same at that point we will go out to 6 months.

## 2022-11-08 ENCOUNTER — OFFICE VISIT (OUTPATIENT)
Dept: CARDIOTHORACIC SURGERY | Age: 62
End: 2022-11-08
Payer: COMMERCIAL

## 2022-11-08 VITALS
WEIGHT: 107 LBS | DIASTOLIC BLOOD PRESSURE: 91 MMHG | HEIGHT: 63 IN | HEART RATE: 106 BPM | BODY MASS INDEX: 18.96 KG/M2 | SYSTOLIC BLOOD PRESSURE: 147 MMHG

## 2022-11-08 DIAGNOSIS — R91.1 NODULE OF LOWER LOBE OF RIGHT LUNG: ICD-10-CM

## 2022-11-08 DIAGNOSIS — R91.1 NODULE OF UPPER LOBE OF RIGHT LUNG: Primary | ICD-10-CM

## 2022-11-08 PROCEDURE — 99214 OFFICE O/P EST MOD 30 MIN: CPT | Performed by: THORACIC SURGERY (CARDIOTHORACIC VASCULAR SURGERY)

## 2022-11-08 PROCEDURE — 3078F DIAST BP <80 MM HG: CPT | Performed by: THORACIC SURGERY (CARDIOTHORACIC VASCULAR SURGERY)

## 2022-11-08 PROCEDURE — G8484 FLU IMMUNIZE NO ADMIN: HCPCS | Performed by: THORACIC SURGERY (CARDIOTHORACIC VASCULAR SURGERY)

## 2022-11-08 PROCEDURE — G8420 CALC BMI NORM PARAMETERS: HCPCS | Performed by: THORACIC SURGERY (CARDIOTHORACIC VASCULAR SURGERY)

## 2022-11-08 PROCEDURE — 3017F COLORECTAL CA SCREEN DOC REV: CPT | Performed by: THORACIC SURGERY (CARDIOTHORACIC VASCULAR SURGERY)

## 2022-11-08 PROCEDURE — G8427 DOCREV CUR MEDS BY ELIG CLIN: HCPCS | Performed by: THORACIC SURGERY (CARDIOTHORACIC VASCULAR SURGERY)

## 2022-11-08 PROCEDURE — 4004F PT TOBACCO SCREEN RCVD TLK: CPT | Performed by: THORACIC SURGERY (CARDIOTHORACIC VASCULAR SURGERY)

## 2022-11-08 PROCEDURE — 3074F SYST BP LT 130 MM HG: CPT | Performed by: THORACIC SURGERY (CARDIOTHORACIC VASCULAR SURGERY)

## 2022-11-22 RX ORDER — ADHESIVE BANDAGE 3/4"
BANDAGE TOPICAL
Qty: 15 ML | Refills: 10 | Status: SHIPPED | OUTPATIENT
Start: 2022-11-22

## 2022-11-22 RX ORDER — LISINOPRIL AND HYDROCHLOROTHIAZIDE 20; 12.5 MG/1; MG/1
1 TABLET ORAL DAILY
Qty: 30 TABLET | Refills: 10 | Status: SHIPPED | OUTPATIENT
Start: 2022-11-22

## 2022-12-29 DIAGNOSIS — R91.1 PULMONARY NODULE: Primary | ICD-10-CM

## 2023-01-24 ENCOUNTER — TELEPHONE (OUTPATIENT)
Dept: CARDIOTHORACIC SURGERY | Age: 63
End: 2023-01-24

## 2023-01-24 NOTE — TELEPHONE ENCOUNTER
Attempted to reach pt multiple times VM full. Pt due for 3 month f/u to eval pulmonary nodules. Sent letter.

## 2023-02-17 RX ORDER — BUPROPION HYDROCHLORIDE 300 MG/1
TABLET ORAL
Qty: 30 TABLET | Refills: 10 | Status: SHIPPED | OUTPATIENT
Start: 2023-02-17

## 2023-04-19 DIAGNOSIS — J44.9 CHRONIC OBSTRUCTIVE PULMONARY DISEASE, UNSPECIFIED COPD TYPE (HCC): ICD-10-CM

## 2023-04-19 NOTE — TELEPHONE ENCOUNTER
Last Appointment:  8/19/2022  Future Appointments   Date Time Provider Glen Alvarado   7/10/2023 10:40 AM Reuben Shepherd  [address]

## 2023-04-20 RX ORDER — TIOTROPIUM BROMIDE 18 UG/1
CAPSULE ORAL; RESPIRATORY (INHALATION)
Qty: 30 CAPSULE | Refills: 2 | Status: SHIPPED | OUTPATIENT
Start: 2023-04-20

## 2023-04-26 DIAGNOSIS — R39.15 URINARY URGENCY: ICD-10-CM

## 2023-04-26 RX ORDER — FLUTICASONE PROPIONATE AND SALMETEROL 50; 250 UG/1; UG/1
POWDER RESPIRATORY (INHALATION)
Qty: 1 EACH | Refills: 10 | Status: SHIPPED | OUTPATIENT
Start: 2023-04-26

## 2023-04-26 RX ORDER — OXYBUTYNIN CHLORIDE 10 MG/1
10 TABLET, EXTENDED RELEASE ORAL DAILY
Qty: 30 TABLET | Refills: 10 | Status: SHIPPED | OUTPATIENT
Start: 2023-04-26

## 2023-04-26 NOTE — TELEPHONE ENCOUNTER
Last Appointment:  8/19/2022  Future Appointments   Date Time Provider Glen Alvarado   7/10/2023 10:40 AM Danny Russell  [address]

## 2023-06-19 DIAGNOSIS — J44.9 CHRONIC OBSTRUCTIVE PULMONARY DISEASE, UNSPECIFIED COPD TYPE (HCC): ICD-10-CM

## 2023-06-19 RX ORDER — ALBUTEROL SULFATE 2.5 MG/3ML
SOLUTION RESPIRATORY (INHALATION)
Qty: 360 ML | Refills: 10 | Status: SHIPPED | OUTPATIENT
Start: 2023-06-19

## 2023-06-19 RX ORDER — ALBUTEROL SULFATE 90 UG/1
AEROSOL, METERED RESPIRATORY (INHALATION)
Qty: 18 G | Refills: 10 | Status: SHIPPED | OUTPATIENT
Start: 2023-06-19

## 2023-06-19 NOTE — TELEPHONE ENCOUNTER
Last Appointment:  8/19/2022  Future Appointments   Date Time Provider Glen Alvarado   7/10/2023 10:40 AM Mookie العلي  [address]

## 2023-07-10 ENCOUNTER — TELEMEDICINE (OUTPATIENT)
Dept: FAMILY MEDICINE CLINIC | Age: 63
End: 2023-07-10
Payer: COMMERCIAL

## 2023-07-10 DIAGNOSIS — F10.20 ALCOHOL USE DISORDER, MODERATE, DEPENDENCE (HCC): ICD-10-CM

## 2023-07-10 DIAGNOSIS — A08.4 VIRAL GASTROENTERITIS: Primary | ICD-10-CM

## 2023-07-10 PROCEDURE — 99442 PR PHYS/QHP TELEPHONE EVALUATION 11-20 MIN: CPT | Performed by: FAMILY MEDICINE

## 2023-07-10 RX ORDER — ONDANSETRON 4 MG/1
4 TABLET, ORALLY DISINTEGRATING ORAL 3 TIMES DAILY PRN
Qty: 21 TABLET | Refills: 0 | Status: SHIPPED | OUTPATIENT
Start: 2023-07-10

## 2023-07-18 DIAGNOSIS — J44.9 CHRONIC OBSTRUCTIVE PULMONARY DISEASE, UNSPECIFIED COPD TYPE (HCC): ICD-10-CM

## 2023-07-19 RX ORDER — PAROXETINE 10 MG/1
10 TABLET, FILM COATED ORAL DAILY
Qty: 30 TABLET | Refills: 10 | Status: SHIPPED | OUTPATIENT
Start: 2023-07-19

## 2023-07-19 RX ORDER — TIOTROPIUM BROMIDE 18 UG/1
CAPSULE ORAL; RESPIRATORY (INHALATION)
Qty: 30 CAPSULE | Refills: 10 | Status: SHIPPED | OUTPATIENT
Start: 2023-07-19

## 2023-09-26 ENCOUNTER — OFFICE VISIT (OUTPATIENT)
Dept: FAMILY MEDICINE CLINIC | Age: 63
End: 2023-09-26
Payer: COMMERCIAL

## 2023-09-26 VITALS
BODY MASS INDEX: 19.84 KG/M2 | TEMPERATURE: 97.6 F | DIASTOLIC BLOOD PRESSURE: 64 MMHG | OXYGEN SATURATION: 94 % | WEIGHT: 112 LBS | HEART RATE: 107 BPM | HEIGHT: 63 IN | RESPIRATION RATE: 16 BRPM | SYSTOLIC BLOOD PRESSURE: 124 MMHG

## 2023-09-26 DIAGNOSIS — I10 ESSENTIAL HYPERTENSION: ICD-10-CM

## 2023-09-26 DIAGNOSIS — F10.20 ALCOHOL USE DISORDER, MODERATE, DEPENDENCE (HCC): ICD-10-CM

## 2023-09-26 DIAGNOSIS — R91.8 LUNG MASS: Primary | ICD-10-CM

## 2023-09-26 DIAGNOSIS — R91.8 MASS OF UPPER LOBE OF LUNG: ICD-10-CM

## 2023-09-26 DIAGNOSIS — R93.89 ABNORMAL CT OF THE CHEST: ICD-10-CM

## 2023-09-26 LAB
ABSOLUTE IMMATURE GRANULOCYTE: <0.03 K/UL (ref 0–0.58)
ALBUMIN SERPL-MCNC: 4.9 G/DL (ref 3.5–5.2)
ALP BLD-CCNC: 98 U/L (ref 35–104)
ALT SERPL-CCNC: 17 U/L (ref 0–32)
ANION GAP SERPL CALCULATED.3IONS-SCNC: 14 MMOL/L (ref 7–16)
AST SERPL-CCNC: 19 U/L (ref 0–31)
BASOPHILS ABSOLUTE: 0.07 K/UL (ref 0–0.2)
BASOPHILS RELATIVE PERCENT: 1 % (ref 0–2)
BILIRUB SERPL-MCNC: 0.4 MG/DL (ref 0–1.2)
BUN BLDV-MCNC: 5 MG/DL (ref 6–23)
CALCIUM SERPL-MCNC: 9.8 MG/DL (ref 8.6–10.2)
CHLORIDE BLD-SCNC: 95 MMOL/L (ref 98–107)
CHOLESTEROL: 186 MG/DL
CO2: 21 MMOL/L (ref 22–29)
CREAT SERPL-MCNC: 0.5 MG/DL (ref 0.5–1)
EOSINOPHILS ABSOLUTE: 0.14 K/UL (ref 0.05–0.5)
EOSINOPHILS RELATIVE PERCENT: 2 % (ref 0–6)
FOLATE: 8 NG/ML (ref 4.8–24.2)
GFR SERPL CREATININE-BSD FRML MDRD: >60 ML/MIN/1.73M2
GLUCOSE BLD-MCNC: 100 MG/DL (ref 74–99)
HCT VFR BLD CALC: 45.8 % (ref 34–48)
HDLC SERPL-MCNC: 52 MG/DL
HEMOGLOBIN: 15.9 G/DL (ref 11.5–15.5)
IMMATURE GRANULOCYTES: 0 % (ref 0–5)
LDL CHOLESTEROL: 108 MG/DL
LYMPHOCYTES ABSOLUTE: 2.96 K/UL (ref 1.5–4)
LYMPHOCYTES RELATIVE PERCENT: 36 % (ref 20–42)
MCH RBC QN AUTO: 32.9 PG (ref 26–35)
MCHC RBC AUTO-ENTMCNC: 34.7 G/DL (ref 32–34.5)
MCV RBC AUTO: 94.8 FL (ref 80–99.9)
MONOCYTES ABSOLUTE: 0.76 K/UL (ref 0.1–0.95)
MONOCYTES RELATIVE PERCENT: 9 % (ref 2–12)
NEUTROPHILS ABSOLUTE: 4.18 K/UL (ref 1.8–7.3)
NEUTROPHILS RELATIVE PERCENT: 52 % (ref 43–80)
PDW BLD-RTO: 14.1 % (ref 11.5–15)
PLATELET # BLD: 355 K/UL (ref 130–450)
PMV BLD AUTO: 9.4 FL (ref 7–12)
POTASSIUM SERPL-SCNC: 4 MMOL/L (ref 3.5–5)
RBC # BLD: 4.83 M/UL (ref 3.5–5.5)
SODIUM BLD-SCNC: 130 MMOL/L (ref 132–146)
TOTAL PROTEIN: 8 G/DL (ref 6.4–8.3)
TRIGL SERPL-MCNC: 130 MG/DL
VITAMIN B-12: 230 PG/ML (ref 211–946)
VLDLC SERPL CALC-MCNC: 26 MG/DL
WBC # BLD: 8.1 K/UL (ref 4.5–11.5)

## 2023-09-26 PROCEDURE — 90677 PCV20 VACCINE IM: CPT | Performed by: FAMILY MEDICINE

## 2023-09-26 PROCEDURE — G8427 DOCREV CUR MEDS BY ELIG CLIN: HCPCS | Performed by: FAMILY MEDICINE

## 2023-09-26 PROCEDURE — 99214 OFFICE O/P EST MOD 30 MIN: CPT | Performed by: FAMILY MEDICINE

## 2023-09-26 PROCEDURE — 3017F COLORECTAL CA SCREEN DOC REV: CPT | Performed by: FAMILY MEDICINE

## 2023-09-26 PROCEDURE — 3078F DIAST BP <80 MM HG: CPT | Performed by: FAMILY MEDICINE

## 2023-09-26 PROCEDURE — 3074F SYST BP LT 130 MM HG: CPT | Performed by: FAMILY MEDICINE

## 2023-09-26 PROCEDURE — 4004F PT TOBACCO SCREEN RCVD TLK: CPT | Performed by: FAMILY MEDICINE

## 2023-09-26 PROCEDURE — G8420 CALC BMI NORM PARAMETERS: HCPCS | Performed by: FAMILY MEDICINE

## 2023-09-26 PROCEDURE — 90674 CCIIV4 VAC NO PRSV 0.5 ML IM: CPT | Performed by: FAMILY MEDICINE

## 2023-09-26 SDOH — ECONOMIC STABILITY: HOUSING INSECURITY
IN THE LAST 12 MONTHS, WAS THERE A TIME WHEN YOU DID NOT HAVE A STEADY PLACE TO SLEEP OR SLEPT IN A SHELTER (INCLUDING NOW)?: NO

## 2023-09-26 SDOH — ECONOMIC STABILITY: INCOME INSECURITY: HOW HARD IS IT FOR YOU TO PAY FOR THE VERY BASICS LIKE FOOD, HOUSING, MEDICAL CARE, AND HEATING?: NOT HARD AT ALL

## 2023-09-26 SDOH — ECONOMIC STABILITY: FOOD INSECURITY: WITHIN THE PAST 12 MONTHS, YOU WORRIED THAT YOUR FOOD WOULD RUN OUT BEFORE YOU GOT MONEY TO BUY MORE.: SOMETIMES TRUE

## 2023-09-26 SDOH — ECONOMIC STABILITY: FOOD INSECURITY: WITHIN THE PAST 12 MONTHS, THE FOOD YOU BOUGHT JUST DIDN'T LAST AND YOU DIDN'T HAVE MONEY TO GET MORE.: SOMETIMES TRUE

## 2023-09-26 ASSESSMENT — PATIENT HEALTH QUESTIONNAIRE - PHQ9
6. FEELING BAD ABOUT YOURSELF - OR THAT YOU ARE A FAILURE OR HAVE LET YOURSELF OR YOUR FAMILY DOWN: 0
1. LITTLE INTEREST OR PLEASURE IN DOING THINGS: 0
SUM OF ALL RESPONSES TO PHQ9 QUESTIONS 1 & 2: 0
3. TROUBLE FALLING OR STAYING ASLEEP: 2
SUM OF ALL RESPONSES TO PHQ QUESTIONS 1-9: 7
10. IF YOU CHECKED OFF ANY PROBLEMS, HOW DIFFICULT HAVE THESE PROBLEMS MADE IT FOR YOU TO DO YOUR WORK, TAKE CARE OF THINGS AT HOME, OR GET ALONG WITH OTHER PEOPLE: 0
5. POOR APPETITE OR OVEREATING: 2
2. FEELING DOWN, DEPRESSED OR HOPELESS: 0
9. THOUGHTS THAT YOU WOULD BE BETTER OFF DEAD, OR OF HURTING YOURSELF: 0
SUM OF ALL RESPONSES TO PHQ QUESTIONS 1-9: 7
SUM OF ALL RESPONSES TO PHQ QUESTIONS 1-9: 7
8. MOVING OR SPEAKING SO SLOWLY THAT OTHER PEOPLE COULD HAVE NOTICED. OR THE OPPOSITE, BEING SO FIGETY OR RESTLESS THAT YOU HAVE BEEN MOVING AROUND A LOT MORE THAN USUAL: 0
7. TROUBLE CONCENTRATING ON THINGS, SUCH AS READING THE NEWSPAPER OR WATCHING TELEVISION: 0
SUM OF ALL RESPONSES TO PHQ QUESTIONS 1-9: 7
4. FEELING TIRED OR HAVING LITTLE ENERGY: 3

## 2023-09-26 NOTE — PROGRESS NOTES
of alcohol     Types: 70 Cans of beer per week     Comment: 12 cans daily    Drug use: Yes     Types: Marijuana Orrie Harness)     Comment: had marijuana 6/21/21       Chart reviewed and updated where appropriate for PMH, Fam, and Soc Hx.  _________________________________________________________  ROS: POSITIVE: As in the HPI. Otherwise Pertinent negatives are negative.    __________________________________________________________  Physical Exam   Constitutional:    She is oriented to person, place, and time. She appears well-developed and well-nourished. HENT:    Right Ear: normal Pinna, normal canal, normal TM. Left Ear: cerumen. Eyes:    Conjunctivae are normal.    Pupils are equal, round, and reactive to light. EOMI. Neck:    Normal range of motion. No thyromegaly or nodules noted. No bruit. No LAD. Cardiovascular:    Normal rate, regular rhythm and normal heart sounds. No murmur. No gallop and no friction rub. Pulmonary/Chest:    Effort inc and breath sounds dec. Quite decreased. No wheezes. No rales or rhonchi. Abdominal:    Soft. Bowel sounds are normal.    No distension. No tenderness. Musculoskeletal:    Normal range of motion. No joint swelling noted. No peripheral edema. Skin:    Skin is warm and dry. No rashes, No lesions. Psychiatric:    She has a normal mood and affect. Normal groom and dress. No SI or HI.   ________________________________________________________    This note may have been created using dictation software.  Efforts were made to reduce errors, but some may persist.

## 2023-10-05 ENCOUNTER — TELEPHONE (OUTPATIENT)
Dept: FAMILY MEDICINE CLINIC | Age: 63
End: 2023-10-05

## 2023-10-05 NOTE — TELEPHONE ENCOUNTER
Received fax from patient's pharmacy that they no longer carry thiamine 250mg. Advised patient and informed patient that vitamin is available OTC. Patient verbalizes understanding.

## 2023-10-05 NOTE — TELEPHONE ENCOUNTER
Patient will be having CT chest at [facility]. Radiologist will need to compare to 10/11/22 CT chest that was done at [facility]. Can you push images to [facility]?

## 2023-10-12 RX ORDER — LANOLIN ALCOHOL/MO/W.PET/CERES
1000 CREAM (GRAM) TOPICAL DAILY
Qty: 30 TABLET | Refills: 3 | Status: SHIPPED | OUTPATIENT
Start: 2023-10-12

## 2023-10-18 RX ORDER — LISINOPRIL AND HYDROCHLOROTHIAZIDE 20; 12.5 MG/1; MG/1
1 TABLET ORAL DAILY
Qty: 30 TABLET | Refills: 10 | Status: SHIPPED | OUTPATIENT
Start: 2023-10-18

## 2023-10-18 NOTE — TELEPHONE ENCOUNTER
Last Appointment:  9/26/2023  Future Appointments   Date Time Provider [address]   11/14/2023  3:40 PM Nuha Nino  [facility]

## 2023-10-31 ASSESSMENT — ENCOUNTER SYMPTOMS
COUGH: 0
CHEST TIGHTNESS: 0
SHORTNESS OF BREATH: 0

## 2023-10-31 NOTE — PROGRESS NOTES
Subjective:      Patient ID: Minor Prado is a 61 y.o. female. HPI  Ms. Arvind Hernandez is a 58year old female with past medical history of COPD, HTN, depression and alcohol use disorder who presents to office to discuss recent CT results and possible need for resection. The patient is known to our service since 2022 when she presented for evaluation of a RUL and RLL nodules. PET in July 2022 showed irregular spiculated 1.4 cm right upper lobe pulmonary nodule demonstrates intermediate level activity, SUV maximum 1.5. PFTs at that time showed FEV1 70% of predicted and DLCO 53% of predicted. Chest CT was deemed to have remained stable, and she was to obtain an additional CT and RTO in 3 months. The patient was lost to follow-up since and has not been compliant with her appointments. Most recent chest CT on 10/12/23 shows further increase in growth of irregular 3 x 5 x 6cm mass in the right apex (1.5 x 2 x 3.5cm in 5/22) and a 5mm nodule in the superior segment of the RLL unchanged with few additional 2-3 mm bilateral pulmonary nodules. She currently denies any CP, SOB, URBANO, fever, chills or syncope. Review of Systems   Constitutional:  Negative for chills, diaphoresis, fatigue and fever. Respiratory:  Negative for cough, chest tightness and shortness of breath. Cardiovascular:  Negative for chest pain, palpitations and leg swelling. Objective:   Physical Exam  Constitutional:       Appearance: Normal appearance. Cardiovascular:      Rate and Rhythm: Normal rate and regular rhythm. Pulses: Normal pulses. Heart sounds: Normal heart sounds. Pulmonary:      Effort: Pulmonary effort is normal.      Breath sounds: Normal breath sounds. Chest:      Chest wall: No tenderness. Abdominal:      Palpations: Abdomen is soft. Assessment:      Lung nodule      Plan: We were following this and she did not come back despite calls and a letter.   I cannot view her images but we will

## 2023-11-07 ENCOUNTER — OFFICE VISIT (OUTPATIENT)
Dept: CARDIOTHORACIC SURGERY | Age: 63
End: 2023-11-07
Payer: COMMERCIAL

## 2023-11-07 VITALS
OXYGEN SATURATION: 98 % | WEIGHT: 110 LBS | BODY MASS INDEX: 19.49 KG/M2 | DIASTOLIC BLOOD PRESSURE: 63 MMHG | HEART RATE: 96 BPM | SYSTOLIC BLOOD PRESSURE: 115 MMHG | HEIGHT: 63 IN

## 2023-11-07 DIAGNOSIS — R91.1 NODULE OF LOWER LOBE OF RIGHT LUNG: Primary | ICD-10-CM

## 2023-11-07 PROCEDURE — G8427 DOCREV CUR MEDS BY ELIG CLIN: HCPCS | Performed by: THORACIC SURGERY (CARDIOTHORACIC VASCULAR SURGERY)

## 2023-11-07 PROCEDURE — G8482 FLU IMMUNIZE ORDER/ADMIN: HCPCS | Performed by: THORACIC SURGERY (CARDIOTHORACIC VASCULAR SURGERY)

## 2023-11-07 PROCEDURE — 3074F SYST BP LT 130 MM HG: CPT | Performed by: THORACIC SURGERY (CARDIOTHORACIC VASCULAR SURGERY)

## 2023-11-07 PROCEDURE — 4004F PT TOBACCO SCREEN RCVD TLK: CPT | Performed by: THORACIC SURGERY (CARDIOTHORACIC VASCULAR SURGERY)

## 2023-11-07 PROCEDURE — 3078F DIAST BP <80 MM HG: CPT | Performed by: THORACIC SURGERY (CARDIOTHORACIC VASCULAR SURGERY)

## 2023-11-07 PROCEDURE — 3017F COLORECTAL CA SCREEN DOC REV: CPT | Performed by: THORACIC SURGERY (CARDIOTHORACIC VASCULAR SURGERY)

## 2023-11-07 PROCEDURE — G8420 CALC BMI NORM PARAMETERS: HCPCS | Performed by: THORACIC SURGERY (CARDIOTHORACIC VASCULAR SURGERY)

## 2023-11-07 PROCEDURE — 99214 OFFICE O/P EST MOD 30 MIN: CPT | Performed by: THORACIC SURGERY (CARDIOTHORACIC VASCULAR SURGERY)

## 2023-11-08 ENCOUNTER — PREP FOR PROCEDURE (OUTPATIENT)
Dept: CARDIOTHORACIC SURGERY | Age: 63
End: 2023-11-08

## 2023-11-08 DIAGNOSIS — R91.1 NODULE OF UPPER LOBE OF RIGHT LUNG: Primary | ICD-10-CM

## 2023-11-14 ENCOUNTER — OFFICE VISIT (OUTPATIENT)
Dept: FAMILY MEDICINE CLINIC | Age: 63
End: 2023-11-14
Payer: COMMERCIAL

## 2023-11-14 VITALS
HEIGHT: 63 IN | WEIGHT: 104 LBS | TEMPERATURE: 98.4 F | BODY MASS INDEX: 18.43 KG/M2 | DIASTOLIC BLOOD PRESSURE: 68 MMHG | SYSTOLIC BLOOD PRESSURE: 130 MMHG | OXYGEN SATURATION: 97 % | HEART RATE: 84 BPM

## 2023-11-14 DIAGNOSIS — F10.90 ALCOHOL USE DISORDER: Primary | ICD-10-CM

## 2023-11-14 DIAGNOSIS — I10 ESSENTIAL HYPERTENSION: ICD-10-CM

## 2023-11-14 DIAGNOSIS — R91.8 LUNG MASS: ICD-10-CM

## 2023-11-14 PROCEDURE — 3017F COLORECTAL CA SCREEN DOC REV: CPT | Performed by: FAMILY MEDICINE

## 2023-11-14 PROCEDURE — 3078F DIAST BP <80 MM HG: CPT | Performed by: FAMILY MEDICINE

## 2023-11-14 PROCEDURE — G8427 DOCREV CUR MEDS BY ELIG CLIN: HCPCS | Performed by: FAMILY MEDICINE

## 2023-11-14 PROCEDURE — G8419 CALC BMI OUT NRM PARAM NOF/U: HCPCS | Performed by: FAMILY MEDICINE

## 2023-11-14 PROCEDURE — 4004F PT TOBACCO SCREEN RCVD TLK: CPT | Performed by: FAMILY MEDICINE

## 2023-11-14 PROCEDURE — 3075F SYST BP GE 130 - 139MM HG: CPT | Performed by: FAMILY MEDICINE

## 2023-11-14 PROCEDURE — G8482 FLU IMMUNIZE ORDER/ADMIN: HCPCS | Performed by: FAMILY MEDICINE

## 2023-11-14 PROCEDURE — 99214 OFFICE O/P EST MOD 30 MIN: CPT | Performed by: FAMILY MEDICINE

## 2023-11-20 RX ORDER — CARBAMIDE PEROXIDE 65 MG/ML
LIQUID TOPICAL
Qty: 15 ML | Refills: 10 | Status: SHIPPED | OUTPATIENT
Start: 2023-11-20

## 2023-11-20 NOTE — TELEPHONE ENCOUNTER
Last Appointment:  11/14/2023  Future Appointments   Date Time Provider [address]   11/21/2023  8:30 AM [facility] PFT STRESS LAB ROOM [facility] PFT [facility]   11/21/2023 10:00 AM SEB PET INJECTION ROOM [facility] PET [facility] Radiolog   11/21/2023 11:00 AM SEB PET SCAN [facility] PET [facility] Radiolog   2/16/2024 10:20 AM Igor Hsu MD [address] [place] [address]

## 2023-11-21 ENCOUNTER — APPOINTMENT (OUTPATIENT)
Dept: PET IMAGING | Age: 63
End: 2023-11-21
Payer: COMMERCIAL

## 2023-11-21 ENCOUNTER — HOSPITAL ENCOUNTER (OUTPATIENT)
Dept: PULMONOLOGY | Age: 63
Discharge: HOME | End: 2023-11-21
Payer: COMMERCIAL

## 2023-11-21 DIAGNOSIS — R91.1 NODULE OF UPPER LOBE OF RIGHT LUNG: ICD-10-CM

## 2023-11-21 PROCEDURE — 94060 EVALUATION OF WHEEZING: CPT

## 2023-11-21 PROCEDURE — 94729 DIFFUSING CAPACITY: CPT

## 2023-11-21 PROCEDURE — 94726 PLETHYSMOGRAPHY LUNG VOLUMES: CPT

## 2023-11-25 NOTE — PROCEDURES
[address]                  [address]                               PULMONARY FUNCTION    PATIENT NAME: Gurwinder Rm                    :        1960  MED REC NO:   [id number]                            ROOM:  ACCOUNT NO:   [de-identified]                           ADMIT DATE: 2023  PROVIDER:     Janine Salazar MD    DATE OF PROCEDURE:  2023    ORDERING PROVIDER:  Azeb Stephenson MD.    INDICATION FOR STUDY:  The patient with dyspnea after exertion. FINDINGS:  As follows:  FVC was 3.46 liters, which was 117% of  predicted. FEV1 was 1.77 liters, which was 76% of predicted. FEV1/FVC  was 51. PPL09-76% was 25. Minute volume ventilation was 52.84 liters,  which was 60% of predicted. Total lung capacity was 6.55 liters, which  was 133% of predicted and diffusion capacity was 12.52 mL per minute per  mmHg, which was 65% of predicted. IMPRESSION:  1. Moderate obstructive lung disease. 2.  No bronchodilator response. 3.  Mild decrement in diffusion capacity. 4.  Increase in total lung capacity consistent with hyperinflation.         Janine Salazar MD    D: 2023 16:13:39       T: 2023 1:43:32     [id number]  Job#: [id number]     Doc#: [id number]    CC:

## 2023-11-29 ENCOUNTER — TELEPHONE (OUTPATIENT)
Dept: CARDIOTHORACIC SURGERY | Age: 63
End: 2023-11-29

## 2023-11-29 DIAGNOSIS — R91.8 LUNG MASS: Primary | ICD-10-CM

## 2023-11-29 NOTE — TELEPHONE ENCOUNTER
It was recommended by the peer to peer Dr that the PET scan be re-submitted with specific reasons why we want the test. New order has been placed.

## 2023-11-29 NOTE — TELEPHONE ENCOUNTER
Received denial for pts PET scheduled for 12/5. Need peer to peer ASAP scanned into media denial letter.

## 2023-12-05 ENCOUNTER — HOSPITAL ENCOUNTER (OUTPATIENT)
Dept: PET IMAGING | Age: 63
Discharge: HOME | End: 2023-12-07
Payer: COMMERCIAL

## 2023-12-05 DIAGNOSIS — R91.8 LUNG MASS: ICD-10-CM

## 2023-12-05 LAB — GLUCOSE BLD-MCNC: 84 MG/DL (ref 74–99)

## 2023-12-05 PROCEDURE — 82962 GLUCOSE BLOOD TEST: CPT

## 2023-12-05 PROCEDURE — 78815 PET IMAGE W/CT SKULL-THIGH: CPT

## 2023-12-05 PROCEDURE — 3430000000 HC RX DIAGNOSTIC RADIOPHARMACEUTICAL: Performed by: RADIOLOGY

## 2023-12-05 PROCEDURE — A9552 F18 FDG: HCPCS | Performed by: RADIOLOGY

## 2023-12-05 RX ORDER — FLUDEOXYGLUCOSE F 18 200 MCI/ML
15 INJECTION, SOLUTION INTRAVENOUS
Status: COMPLETED | OUTPATIENT
Start: 2023-12-05 | End: 2023-12-05

## 2023-12-05 RX ADMIN — FLUDEOXYGLUCOSE F 18 15 MILLICURIE: 200 INJECTION, SOLUTION INTRAVENOUS at 10:13

## 2023-12-11 ASSESSMENT — ENCOUNTER SYMPTOMS
ABDOMINAL DISTENTION: 0
COUGH: 1
NAUSEA: 0
VOMITING: 0

## 2023-12-11 NOTE — PROGRESS NOTES
Subjective:      Patient ID: Nicole Merlos is a 61 y.o. female. HPI: Ms. Swathi Friend is a 58year old female with past medical history of COPD, HTN, depression and alcohol use disorder who presents to office to discuss PFT and PET scan results and possible surgery. The patient is known to our service since 2022 when she presented for evaluation of a RUL and RLL nodules. PET in July 2022 showed irregular spiculated 1.4 cm right upper lobe pulmonary nodule demonstrates intermediate level activity, SUV maximum 1.5. PFTs at that time showed FEV1 70% of predicted and DLCO 53% of predicted. Chest CT was deemed to have remained stable, and she was to obtain an additional CT and RTO in 3 months. The patient was lost to follow-up after that and was not compliant with her appointments. She presented to back to the office on 11/7/2023 to review most recent CT. Chest CT on 10/12/23 shows further increase in growth of irregular 3 x 5 x 6cm mass in the right apex (1.5 x 2 x 3.5cm in 5/22) and a 5mm nodule in the superior segment of the RLL unchanged with few additional 2-3 mm bilateral pulmonary nodules. Images were unable to be viewed at the appointment but they were later uploaded and reviewed. It was felt that the mass indeed had increased in size and therefore she was referred for PFTs and a repeat PET scan(reports below). She currently denies any CP, SOB, URBANO, fever, chills or syncope. PFTs 11/21/2023:   FEV1: 1.77 L, 76% of predicted   DLCO: 12.29 ml/min/mmHg, 64% of predicted       PET CT 12/5/2023:  FINDINGS:  HEAD/NECK: No metabolically active cervical lymphadenopathy. CHEST:   Irregular right upper lobe mass measures about 2.6 x 1.6 cm compared  to approximately 1.4 x 0.6 cm on the PET-CT scan 07/06/2022, SUV max of 4.5  compared to 1.5 previously. Irregular nodule in the superior segment of the right lower lobe measures  about 1.1 cm, enlarged from about 0.5 cm on the prior PET-CT scan, SUV max of  2.7.

## 2023-12-12 ENCOUNTER — OFFICE VISIT (OUTPATIENT)
Dept: CARDIOTHORACIC SURGERY | Age: 63
End: 2023-12-12
Payer: COMMERCIAL

## 2023-12-12 VITALS
BODY MASS INDEX: 18.6 KG/M2 | DIASTOLIC BLOOD PRESSURE: 76 MMHG | SYSTOLIC BLOOD PRESSURE: 119 MMHG | WEIGHT: 105 LBS | HEART RATE: 106 BPM

## 2023-12-12 DIAGNOSIS — R91.8 LUNG MASS: Primary | ICD-10-CM

## 2023-12-12 DIAGNOSIS — R91.1 NODULE OF LOWER LOBE OF RIGHT LUNG: ICD-10-CM

## 2023-12-12 DIAGNOSIS — R91.8 MASS OF UPPER LOBE OF RIGHT LUNG: Primary | ICD-10-CM

## 2023-12-12 PROCEDURE — G8482 FLU IMMUNIZE ORDER/ADMIN: HCPCS | Performed by: THORACIC SURGERY (CARDIOTHORACIC VASCULAR SURGERY)

## 2023-12-12 PROCEDURE — 3017F COLORECTAL CA SCREEN DOC REV: CPT | Performed by: THORACIC SURGERY (CARDIOTHORACIC VASCULAR SURGERY)

## 2023-12-12 PROCEDURE — 99214 OFFICE O/P EST MOD 30 MIN: CPT | Performed by: THORACIC SURGERY (CARDIOTHORACIC VASCULAR SURGERY)

## 2023-12-12 PROCEDURE — 3074F SYST BP LT 130 MM HG: CPT | Performed by: THORACIC SURGERY (CARDIOTHORACIC VASCULAR SURGERY)

## 2023-12-12 PROCEDURE — 3078F DIAST BP <80 MM HG: CPT | Performed by: THORACIC SURGERY (CARDIOTHORACIC VASCULAR SURGERY)

## 2023-12-12 PROCEDURE — 4004F PT TOBACCO SCREEN RCVD TLK: CPT | Performed by: THORACIC SURGERY (CARDIOTHORACIC VASCULAR SURGERY)

## 2023-12-12 PROCEDURE — G8427 DOCREV CUR MEDS BY ELIG CLIN: HCPCS | Performed by: THORACIC SURGERY (CARDIOTHORACIC VASCULAR SURGERY)

## 2023-12-12 PROCEDURE — G8420 CALC BMI NORM PARAMETERS: HCPCS | Performed by: THORACIC SURGERY (CARDIOTHORACIC VASCULAR SURGERY)

## 2023-12-27 ENCOUNTER — HOSPITAL ENCOUNTER (OUTPATIENT)
Dept: RADIATION ONCOLOGY | Age: 63
Discharge: HOME | End: 2023-12-27
Payer: COMMERCIAL

## 2023-12-27 VITALS
OXYGEN SATURATION: 94 % | SYSTOLIC BLOOD PRESSURE: 140 MMHG | DIASTOLIC BLOOD PRESSURE: 79 MMHG | TEMPERATURE: 97.4 F | RESPIRATION RATE: 18 BRPM | BODY MASS INDEX: 18.44 KG/M2 | HEART RATE: 105 BPM | WEIGHT: 104.1 LBS

## 2023-12-27 DIAGNOSIS — C34.90 MALIGNANT NEOPLASM OF LUNG, UNSPECIFIED LATERALITY, UNSPECIFIED PART OF LUNG (HCC): Primary | ICD-10-CM

## 2023-12-27 PROCEDURE — 99245 OFF/OP CONSLTJ NEW/EST HI 55: CPT | Performed by: RADIOLOGY

## 2023-12-27 PROCEDURE — 99205 OFFICE O/P NEW HI 60 MIN: CPT

## 2024-01-02 ENCOUNTER — TELEPHONE (OUTPATIENT)
Dept: CASE MANAGEMENT | Age: 64
End: 2024-01-02

## 2024-01-02 NOTE — TELEPHONE ENCOUNTER
Received a message from Dr Yoon requesting assistance with getting patient's pulmonology appointment moved to a sooner date. Patient needs evaluated to determine if she can get a biopsy prior to any Radiation treatments. Patient is currently scheduled for consult with Pulm on 3/7. Called and talked to Pulmonary reception. She was going to give patient information to Dr Estes's nurse to review with the doctor to what they could do to get the patient in to be seen sooner than  March. Updated Dr Yoon. Will continue to follow to see if patient gets rescheduled for Pulmonary consult.

## 2024-01-16 RX ORDER — LANOLIN ALCOHOL/MO/W.PET/CERES
1000 CREAM (GRAM) TOPICAL DAILY
Qty: 30 TABLET | Refills: 10 | Status: SHIPPED | OUTPATIENT
Start: 2024-01-16

## 2024-01-16 RX ORDER — BUPROPION HYDROCHLORIDE 300 MG/1
TABLET ORAL
Qty: 30 TABLET | Refills: 10 | Status: SHIPPED | OUTPATIENT
Start: 2024-01-16

## 2024-01-16 NOTE — TELEPHONE ENCOUNTER
Last Appointment:  11/14/2023  Future Appointments   Date Time Provider Department Center   1/18/2024  1:20 PM Vee Estes DO [facility] Pulm [facility]   1/25/2024  2:45 PM YZ MED ONC FAST TRACK 1 [facility] Med Onc . [place]   1/25/2024  2:45 PM Hipolito Melendez MD MED ONC [facility]   2/16/2024 10:20 AM [facility]

## 2024-01-18 ENCOUNTER — OFFICE VISIT (OUTPATIENT)
Dept: PULMONOLOGY | Age: 64
End: 2024-01-18
Payer: COMMERCIAL

## 2024-01-18 DIAGNOSIS — Z72.0 TOBACCO USE: ICD-10-CM

## 2024-01-18 DIAGNOSIS — R91.1 LUNG NODULE SEEN ON IMAGING STUDY: ICD-10-CM

## 2024-01-18 DIAGNOSIS — R91.8 LUNG MASS: Primary | ICD-10-CM

## 2024-01-18 PROCEDURE — G8428 CUR MEDS NOT DOCUMENT: HCPCS | Performed by: INTERNAL MEDICINE

## 2024-01-18 PROCEDURE — 99204 OFFICE O/P NEW MOD 45 MIN: CPT | Performed by: INTERNAL MEDICINE

## 2024-01-18 PROCEDURE — 4004F PT TOBACCO SCREEN RCVD TLK: CPT | Performed by: INTERNAL MEDICINE

## 2024-01-18 PROCEDURE — 3017F COLORECTAL CA SCREEN DOC REV: CPT | Performed by: INTERNAL MEDICINE

## 2024-01-18 PROCEDURE — G8482 FLU IMMUNIZE ORDER/ADMIN: HCPCS | Performed by: INTERNAL MEDICINE

## 2024-01-18 PROCEDURE — G8419 CALC BMI OUT NRM PARAM NOF/U: HCPCS | Performed by: INTERNAL MEDICINE

## 2024-01-22 ENCOUNTER — TELEPHONE (OUTPATIENT)
Dept: PULMONOLOGY | Age: 64
End: 2024-01-22

## 2024-01-22 DIAGNOSIS — R91.1 LUNG NODULE SEEN ON IMAGING STUDY: Primary | ICD-10-CM

## 2024-01-22 NOTE — TELEPHONE ENCOUNTER
Mailed a letter to patient informing her that her CT Chest is scheduled for 2-19-24 at 4:00 pm at the [facility]. She must arrive by 3:30 pm. There is no prep for this test

## 2024-01-25 ENCOUNTER — TELEPHONE (OUTPATIENT)
Dept: CASE MANAGEMENT | Age: 64
End: 2024-01-25

## 2024-01-25 ENCOUNTER — HOSPITAL ENCOUNTER (OUTPATIENT)
Dept: INFUSION THERAPY | Age: 64
Discharge: HOME | End: 2024-01-25

## 2024-01-25 ENCOUNTER — TELEPHONE (OUTPATIENT)
Dept: ONCOLOGY | Age: 64
End: 2024-01-25

## 2024-01-25 ENCOUNTER — OFFICE VISIT (OUTPATIENT)
Dept: ONCOLOGY | Age: 64
End: 2024-01-25
Payer: COMMERCIAL

## 2024-01-25 VITALS
TEMPERATURE: 98.3 F | WEIGHT: 105 LBS | SYSTOLIC BLOOD PRESSURE: 123 MMHG | BODY MASS INDEX: 18.61 KG/M2 | HEIGHT: 63 IN | OXYGEN SATURATION: 94 % | DIASTOLIC BLOOD PRESSURE: 72 MMHG | HEART RATE: 127 BPM

## 2024-01-25 DIAGNOSIS — R91.8 LUNG MASS: Primary | ICD-10-CM

## 2024-01-25 PROCEDURE — 3017F COLORECTAL CA SCREEN DOC REV: CPT | Performed by: STUDENT IN AN ORGANIZED HEALTH CARE EDUCATION/TRAINING PROGRAM

## 2024-01-25 PROCEDURE — 3074F SYST BP LT 130 MM HG: CPT | Performed by: STUDENT IN AN ORGANIZED HEALTH CARE EDUCATION/TRAINING PROGRAM

## 2024-01-25 PROCEDURE — 99204 OFFICE O/P NEW MOD 45 MIN: CPT | Performed by: STUDENT IN AN ORGANIZED HEALTH CARE EDUCATION/TRAINING PROGRAM

## 2024-01-25 PROCEDURE — G8427 DOCREV CUR MEDS BY ELIG CLIN: HCPCS | Performed by: STUDENT IN AN ORGANIZED HEALTH CARE EDUCATION/TRAINING PROGRAM

## 2024-01-25 PROCEDURE — G8482 FLU IMMUNIZE ORDER/ADMIN: HCPCS | Performed by: STUDENT IN AN ORGANIZED HEALTH CARE EDUCATION/TRAINING PROGRAM

## 2024-01-25 PROCEDURE — G8420 CALC BMI NORM PARAMETERS: HCPCS | Performed by: STUDENT IN AN ORGANIZED HEALTH CARE EDUCATION/TRAINING PROGRAM

## 2024-01-25 PROCEDURE — 3078F DIAST BP <80 MM HG: CPT | Performed by: STUDENT IN AN ORGANIZED HEALTH CARE EDUCATION/TRAINING PROGRAM

## 2024-01-25 PROCEDURE — 4004F PT TOBACCO SCREEN RCVD TLK: CPT | Performed by: STUDENT IN AN ORGANIZED HEALTH CARE EDUCATION/TRAINING PROGRAM

## 2024-01-25 NOTE — TELEPHONE ENCOUNTER
SW met with pt at the request of cancer center staff.  SW reviewed positive distress screen with pt.  Pt reported being anxious due to the process was taking a long time and stated that she would feel better when she knew exactly what to expect.  Pt stated that she may have some transportation concerns in the future.  SW discussed transportation services in [facility] including [employer] at this time.  Pt was encouraged to reach out to this provider should any other needs arise.  Pt reported understanding.        Hossein CURRIE, CHEN-S  Oncology Social Worker

## 2024-01-25 NOTE — PROGRESS NOTES
Kenji Gan  1/25/2024  Ht Readings from Last 1 Encounters:   01/25/24 1.6 m (5' 3\")     Wt Readings from Last 10 Encounters:   01/25/24 47.6 kg (105 lb)   12/27/23 47.2 kg (104 lb 1.6 oz)   12/12/23 47.6 kg (105 lb)   11/14/23 47.2 kg (104 lb)   11/07/23 49.9 kg (110 lb)   09/26/23 50.8 kg (112 lb)   11/08/22 48.5 kg (107 lb)   10/11/22 46.3 kg (102 lb)   08/19/22 49.9 kg (110 lb)   07/27/22 48.1 kg (106 lb)     BMI: 18.6  Assessment: Met w/ pt for introduction during initial consult for lung CA. Pt had reported ~20# wt loss over last year or so to NN during visit, however no evidence of this per EMR review. Pt reports good appetite, though admits to some nondescript occasional GI issues depending on foods consumed. Of note, pt habitual marijuana user which may exacerbate GI concerns. Pt denies any specific nutrition needs at this time, understandably more concerned w/ oncology plan at this time. Encouraged to contact this clinician as needed. Will follow as treatment initiated.    Lila Ortiz, MS, RD, LD

## 2024-01-25 NOTE — TELEPHONE ENCOUNTER
Met with patient and her sister, Quincy, during her  initial consultation with Dr. Melendez  for her  recent lung cancer diagnosis. Introduced myself and explained my role with patients receiving treatment at our center.  Patient was friendly and receptive. Instructed on next steps including MRI brain and bronchoscopy with biopsy, with Dr. Estes, per Dr. Melendez's recommendations and follow up care. Provided patient with transportation resource list and literature on support group services, both in person and on line, [employer] Quit Smoking pamphlet, ACS Tobacco What's it costing you and Patient Resource Lung Cancer booklet. Patient states that at this time she is not interested in quitting smoking. Reviewed resources available to her  such as Social Work and Dietitian. Patient has had 20 pounds weight loss but she is unsure of when this started and she said that she has a lot of stomach issues so sometimes she eats well and sometimes she doesn't.  She also would like information on transportation.  She usually has family that help with this but would like options for when she might need it.  Both dietitian and  have been updated regarding these issues at this time. Patient states that she doesn't currently have a phone but communicates via email and she will verify with office staff that correct email is in her chart. New patient nursing assessment, medical history, surgical history, family history completed. Medication list reviewed and updated. Provided with my contact information and instructed patient to call me with questions or concerns. Verbalizes understanding. Patient appreciative of visit. Will continue to follow. Geeta PRABHAKARN, RN-OCN Nurse Navigator

## 2024-01-25 NOTE — PROGRESS NOTES
Kenji Gan  1960 63 y.o.      Referring Physician: Dr. Yoon    PCP: Mike Hsu MD    There were no vitals filed for this visit.     Wt Readings from Last 3 Encounters:   23 47.2 kg (104 lb 1.6 oz)   23 47.6 kg (105 lb)   23 47.2 kg (104 lb)        There is no height or weight on file to calculate BMI.          Chief Complaint: No chief complaint on file.         Cancer Staging   No matching staging information was found for the patient.    Prior Radiation Therapy? NO    Concurrent Chemo/radiation? NO    Prior Chemotherapy? NO    Prior Hormonal Therapy? NO    Head and Neck Cancer? No, patient does NOT have HN cancer.      LMP: 45    Age at first Menses: 13    : 3    Para: 3          Current Outpatient Medications:     vitamin B-12 (CYANOCOBALAMIN) 1000 MCG tablet, TAKE 1 TABLET BY MOUTH DAILY, Disp: 30 tablet, Rfl: 10    buPROPion (WELLBUTRIN XL) 300 MG extended release tablet, TAKE 1 TABLET BY MOUTH EVERY MORNING, Disp: 30 tablet, Rfl: 10    GNP EARWAX REMOVAL DROPS 6.5 % otic solution, INSTILL 5 DROPS INTO LEFT EAR AS NEEDED FOR EAR WAX, Disp: 15 mL, Rfl: 10    lisinopril-hydroCHLOROthiazide (PRINZIDE;ZESTORETIC) 20-12.5 MG per tablet, TAKE 1 TABLET BY MOUTH DAILY, Disp: 30 tablet, Rfl: 10    thiamine 250 MG tablet, Take 1 tablet by mouth daily, Disp: 90 tablet, Rfl: 0    PARoxetine (PAXIL) 10 MG tablet, TAKE 1 TABLET BY MOUTH DAILY, Disp: 30 tablet, Rfl: 10    SPIRIVA HANDIHALER 18 MCG inhalation capsule, INHALE THE CONTENTS OF ONE (1) CAPSULE BY MOUTH VIA HANDIHALER ONCE DAILY AS DIRECTED *DO NOT SWALLOW CAPSULE*, Disp: 30 capsule, Rfl: 10    albuterol (PROVENTIL) (2.5 MG/3ML) 0.083% nebulizer solution, INHALE 1 VIAL VIA NEBULIZER EVERY 6 HOURS AS NEEDED FOR WHEEZING, Disp: 360 mL, Rfl: 10    VENTOLIN  (90 Base) MCG/ACT inhaler, INHALE TWO (2) PUFFS BY MOUTH EVERY 6 HOURS AS NEEDED FOR SHORTNESS OF BREATH OR WHEEZING, Disp: 18 g, Rfl: 10    oxybutynin

## 2024-01-26 ENCOUNTER — TELEPHONE (OUTPATIENT)
Dept: CASE MANAGEMENT | Age: 64
End: 2024-01-26

## 2024-01-26 NOTE — TELEPHONE ENCOUNTER
Received call back from Jaki RN in pulmonary regarding if CT scan and bronchoscopy could be done sooner.  Jaki states that the physician that will be performing this particular type of bronchoscopy is out of office till 2/15/24 and that is why the CT was scheduled for 2/19  as it has to be done very close to the time of the bronchoscopy.  Patient family notified and will update Dr. Melendez.

## 2024-01-30 ENCOUNTER — TELEPHONE (OUTPATIENT)
Dept: PULMONOLOGY | Age: 64
End: 2024-01-30

## 2024-01-30 DIAGNOSIS — K21.9 GASTROESOPHAGEAL REFLUX DISEASE WITHOUT ESOPHAGITIS: Primary | ICD-10-CM

## 2024-01-30 DIAGNOSIS — D12.6 ADENOMATOUS POLYP OF COLON, UNSPECIFIED PART OF COLON: ICD-10-CM

## 2024-01-30 NOTE — TELEPHONE ENCOUNTER
Attempting to reach pt and mobile number [phone] is a voicemail for someone named Yoselin and [phone] states it is not a working number for pt. Message sent thru [employer] to reach out to patient.

## 2024-02-03 NOTE — TELEPHONE ENCOUNTER
[address] [facility] Way[facility] Prior Authorization Dept. She is calling to let you know that the Authorization for the Nuclear Radha Rodriguez is still pending. No

## 2024-02-07 ENCOUNTER — HOSPITAL ENCOUNTER (OUTPATIENT)
Dept: MRI IMAGING | Age: 64
Discharge: HOME | End: 2024-02-09
Attending: STUDENT IN AN ORGANIZED HEALTH CARE EDUCATION/TRAINING PROGRAM
Payer: COMMERCIAL

## 2024-02-07 ENCOUNTER — HOSPITAL ENCOUNTER (OUTPATIENT)
Age: 64
Discharge: HOME | End: 2024-02-07
Attending: STUDENT IN AN ORGANIZED HEALTH CARE EDUCATION/TRAINING PROGRAM
Payer: COMMERCIAL

## 2024-02-07 DIAGNOSIS — D12.6 ADENOMATOUS POLYP OF COLON, UNSPECIFIED PART OF COLON: ICD-10-CM

## 2024-02-07 DIAGNOSIS — R91.8 LUNG MASS: ICD-10-CM

## 2024-02-07 LAB
BUN SERPL-MCNC: 7 MG/DL (ref 6–23)
CREAT SERPL-MCNC: 0.5 MG/DL (ref 0.5–1)
GFR SERPL CREATININE-BSD FRML MDRD: >60 ML/MIN/1.73M2

## 2024-02-07 PROCEDURE — 82565 ASSAY OF CREATININE: CPT

## 2024-02-07 PROCEDURE — 6360000004 HC RX CONTRAST MEDICATION: Performed by: RADIOLOGY

## 2024-02-07 PROCEDURE — 84520 ASSAY OF UREA NITROGEN: CPT

## 2024-02-07 PROCEDURE — 70553 MRI BRAIN STEM W/O & W/DYE: CPT

## 2024-02-07 PROCEDURE — A9577 INJ MULTIHANCE: HCPCS | Performed by: RADIOLOGY

## 2024-02-07 PROCEDURE — 36415 COLL VENOUS BLD VENIPUNCTURE: CPT

## 2024-02-07 RX ADMIN — GADOBENATE DIMEGLUMINE 10 ML: 529 INJECTION, SOLUTION INTRAVENOUS at 18:09

## 2024-02-16 ENCOUNTER — OFFICE VISIT (OUTPATIENT)
Dept: FAMILY MEDICINE CLINIC | Age: 64
End: 2024-02-16
Payer: COMMERCIAL

## 2024-02-16 VITALS
WEIGHT: 104 LBS | TEMPERATURE: 98 F | SYSTOLIC BLOOD PRESSURE: 132 MMHG | HEIGHT: 63 IN | HEART RATE: 106 BPM | OXYGEN SATURATION: 97 % | BODY MASS INDEX: 18.43 KG/M2 | DIASTOLIC BLOOD PRESSURE: 80 MMHG

## 2024-02-16 DIAGNOSIS — F10.91 ALCOHOL USE DISORDER IN REMISSION: ICD-10-CM

## 2024-02-16 DIAGNOSIS — Z01.811 PRE-OP CHEST EXAM: Primary | ICD-10-CM

## 2024-02-16 DIAGNOSIS — C34.90 MALIGNANT NEOPLASM OF LUNG, UNSPECIFIED LATERALITY, UNSPECIFIED PART OF LUNG (HCC): ICD-10-CM

## 2024-02-16 DIAGNOSIS — R39.15 URINARY URGENCY: ICD-10-CM

## 2024-02-16 DIAGNOSIS — J44.9 CHRONIC OBSTRUCTIVE PULMONARY DISEASE, UNSPECIFIED COPD TYPE (HCC): ICD-10-CM

## 2024-02-16 PROBLEM — F10.20 ALCOHOL USE DISORDER, MODERATE, DEPENDENCE (HCC): Status: RESOLVED | Noted: 2020-01-02 | Resolved: 2024-02-16

## 2024-02-16 PROCEDURE — 3079F DIAST BP 80-89 MM HG: CPT | Performed by: FAMILY MEDICINE

## 2024-02-16 PROCEDURE — G8427 DOCREV CUR MEDS BY ELIG CLIN: HCPCS | Performed by: FAMILY MEDICINE

## 2024-02-16 PROCEDURE — 4004F PT TOBACCO SCREEN RCVD TLK: CPT | Performed by: FAMILY MEDICINE

## 2024-02-16 PROCEDURE — 93000 ELECTROCARDIOGRAM COMPLETE: CPT | Performed by: FAMILY MEDICINE

## 2024-02-16 PROCEDURE — G8419 CALC BMI OUT NRM PARAM NOF/U: HCPCS | Performed by: FAMILY MEDICINE

## 2024-02-16 PROCEDURE — 3023F SPIROM DOC REV: CPT | Performed by: FAMILY MEDICINE

## 2024-02-16 PROCEDURE — G8482 FLU IMMUNIZE ORDER/ADMIN: HCPCS | Performed by: FAMILY MEDICINE

## 2024-02-16 PROCEDURE — 99214 OFFICE O/P EST MOD 30 MIN: CPT | Performed by: FAMILY MEDICINE

## 2024-02-16 PROCEDURE — 3017F COLORECTAL CA SCREEN DOC REV: CPT | Performed by: FAMILY MEDICINE

## 2024-02-16 PROCEDURE — 3075F SYST BP GE 130 - 139MM HG: CPT | Performed by: FAMILY MEDICINE

## 2024-02-16 RX ORDER — OXYBUTYNIN CHLORIDE 10 MG/1
10 TABLET, EXTENDED RELEASE ORAL DAILY
Qty: 30 TABLET | Refills: 10 | Status: SHIPPED | OUTPATIENT
Start: 2024-02-16

## 2024-02-16 NOTE — PROGRESS NOTES
[facility]   PRE-ADMISSION TESTING GENERAL INSTRUCTIONS  PAT Phone Number: [phone]      GENERAL INSTRUCTIONS:    [x] Antibacterial Soap Shower Night before surgery.  [] CHG Wipes instruction sheet and wipes given.  [x] Do not wear makeup, lotions, powders, deodorant the morning of surgery.  [x] No SOLID foods after midnight. You may have CLEAR liquids up to 2 hours before your surgery.  [x] You may brush your teeth, gargle, but do NOT swallow water.   [x] No tobacco products, illegal drugs, or alcohol within 24 hours of your surgery.  [x] Jewelry or valuables should not be brought to the hospital. All body and/or tongue piercing's must be removed prior to arriving to hospital. No contact lens or hair pins.   [x] Arrange transportation with a responsible adult  to and from the hospital. Arrange for someone to be with you for the remainder of the day and for 24 hours after your procedure due to having had anesthesia.          -Who will be your  for transportation? sister        -Who will be staying with you for 24 hrs after your procedure? sister  [x] Bring insurance card and photo ID.  [] Bring copy of living will or healthcare power of  papers to be placed in your electronic record.  [] Urine Pregnancy test will be preformed the day of surgery. A specimen sample may be brought from home.  [] Transfusion (Green) Bracelet: Please bring with you to hospital, day of surgery.     PARKING INSTRUCTIONS:     [x] ARRIVAL DATE & TIME: 2/22 at 1145  [x] Times are subject to change. We will contact you the business day before surgery if that were to occur.  [x] Enter into the [facility] Entrance. Two people may accompany you. Masks are not required.  [x] Parking Lot \"I\" is where you will park. It is located on the corner of [address] and [address]. The entrance is on [address] [address] [facility].   Only one vehicle - per patient, is permitted in parking lot.   Upon  the time you are needed in the pre-op area to prepare you for surgery. Please do not be discouraged if you are not greeted in the order you arrive as there are many variables that are involved in patient preparation. Your patience is greatly appreciated as you wait for your nurse.   [x] Delays may occur with surgery and staff will make a sincere effort to keep you informed of delays. If any delays occur with your procedure, we apologize ahead of time for your inconvenience as we recognize the value of your time.

## 2024-02-16 NOTE — PROGRESS NOTES
INES Mata MD at [facility] OR    COLONOSCOPY  8/19/15    ENDOSCOPY, COLON, DIAGNOSTIC  8/19/15    UPPER GASTROINTESTINAL ENDOSCOPY N/A 6/11/2021    EGD BIOPSY performed by Davina Mata MD at [facility] ENDOSCOPY       Social History     Tobacco Use    Smoking status: Every Day     Current packs/day: 1.50     Average packs/day: 1.5 packs/day for 45.0 years (67.5 ttl pk-yrs)     Types: Cigarettes    Smokeless tobacco: Never   Vaping Use    Vaping Use: Former    Substances: CBD   Substance Use Topics    Alcohol use: Not Currently     Alcohol/week: 70.0 standard drinks of alcohol     Types: 70 Cans of beer per week     Comment: nothing for 6 months    Drug use: Yes     Types: Marijuana (Weed)     Comment: had marijuana 1/25/2024       Chart reviewed and updated where appropriate for PMH, Fam, and Soc Hx.  _________________________________________________________  ROS: POSITIVE: As in the HPI.   Otherwise Pertinent negatives are negative.    __________________________________________________________  Physical Exam   Constitutional:    She is oriented to person, place, and time.    She appears well-developed and well-nourished.   Eyes:    Conjunctivae are normal.    Pupils are equal, round, and reactive to light.    EOMI.   Neck:    Normal range of motion.    No thyromegaly or nodules noted.    No bruit. No LAD.  Cardiovascular:    Tachycardia, regular rhythm and normal heart sounds.     No murmur. No gallop and no friction rub.   Pulmonary/Chest:    Effort increased and breath sounds decreased.    Persistent wheeze, loudest in the right lung fields.  No rales or rhonchi.  Abdominal:    Soft. Bowel sounds are normal.    No distension. No tenderness.   Musculoskeletal:    Normal range of motion.     No joint swelling noted.    No peripheral edema.  Skin:    Skin is warm and dry.    No rashes, No lesions.  Psychiatric:    She has a normal mood and affect.    Normal groom and dress. No SI or HI.

## 2024-02-19 ENCOUNTER — HOSPITAL ENCOUNTER (OUTPATIENT)
Dept: CT IMAGING | Age: 64
Discharge: HOME | End: 2024-02-21
Attending: INTERNAL MEDICINE
Payer: COMMERCIAL

## 2024-02-19 DIAGNOSIS — R91.1 LUNG NODULE SEEN ON IMAGING STUDY: ICD-10-CM

## 2024-02-19 PROCEDURE — 71250 CT THORAX DX C-: CPT

## 2024-02-22 ENCOUNTER — ANESTHESIA EVENT (OUTPATIENT)
Dept: ENDOSCOPY | Age: 64
End: 2024-02-22
Payer: COMMERCIAL

## 2024-02-22 ENCOUNTER — APPOINTMENT (OUTPATIENT)
Dept: GENERAL RADIOLOGY | Age: 64
End: 2024-02-22
Attending: INTERNAL MEDICINE
Payer: COMMERCIAL

## 2024-02-22 ENCOUNTER — HOSPITAL ENCOUNTER (OUTPATIENT)
Age: 64
Setting detail: OUTPATIENT SURGERY
Discharge: HOME | End: 2024-02-22
Attending: INTERNAL MEDICINE | Admitting: INTERNAL MEDICINE
Payer: COMMERCIAL

## 2024-02-22 ENCOUNTER — ANESTHESIA (OUTPATIENT)
Dept: ENDOSCOPY | Age: 64
End: 2024-02-22
Payer: COMMERCIAL

## 2024-02-22 VITALS
HEIGHT: 63 IN | WEIGHT: 104 LBS | DIASTOLIC BLOOD PRESSURE: 60 MMHG | BODY MASS INDEX: 18.43 KG/M2 | TEMPERATURE: 98.2 F | SYSTOLIC BLOOD PRESSURE: 97 MMHG | HEART RATE: 84 BPM | RESPIRATION RATE: 23 BRPM | OXYGEN SATURATION: 79 %

## 2024-02-22 DIAGNOSIS — C34.90 MALIGNANT NEOPLASM OF LUNG, UNSPECIFIED LATERALITY, UNSPECIFIED PART OF LUNG (HCC): Primary | ICD-10-CM

## 2024-02-22 DIAGNOSIS — R91.8 MASS OF UPPER LOBE OF RIGHT LUNG: ICD-10-CM

## 2024-02-22 LAB
INR PPP: 1.1
PARTIAL THROMBOPLASTIN TIME: 33.5 SEC (ref 24.5–35.1)
PLATELET # BLD AUTO: 303 K/UL (ref 130–450)
PROTHROMBIN TIME: 11.5 SEC (ref 9.3–12.4)

## 2024-02-22 PROCEDURE — 7100000010 HC PHASE II RECOVERY - FIRST 15 MIN: Performed by: INTERNAL MEDICINE

## 2024-02-22 PROCEDURE — 3609011300 HC BRONCHOSCOPY BRONCHIAL/ENDOBRNCL BX 1+ SITES: Performed by: INTERNAL MEDICINE

## 2024-02-22 PROCEDURE — 2580000003 HC RX 258: Performed by: NURSE ANESTHETIST, CERTIFIED REGISTERED

## 2024-02-22 PROCEDURE — 31632 BRONCHOSCOPY/LUNG BX ADDL: CPT | Performed by: INTERNAL MEDICINE

## 2024-02-22 PROCEDURE — 87077 CULTURE AEROBIC IDENTIFY: CPT

## 2024-02-22 PROCEDURE — 88112 CYTOPATH CELL ENHANCE TECH: CPT

## 2024-02-22 PROCEDURE — 88305 TISSUE EXAM BY PATHOLOGIST: CPT

## 2024-02-22 PROCEDURE — 3603165200 HC BRNCHSC EBUS GUIDED SAMPL 1/2 NODE STATION/STRUX: Performed by: INTERNAL MEDICINE

## 2024-02-22 PROCEDURE — 3609020000 HC BRONCHOSCOPY W/EBUS FNA: Performed by: INTERNAL MEDICINE

## 2024-02-22 PROCEDURE — 3700000000 HC ANESTHESIA ATTENDED CARE: Performed by: INTERNAL MEDICINE

## 2024-02-22 PROCEDURE — 85610 PROTHROMBIN TIME: CPT

## 2024-02-22 PROCEDURE — 3700000001 HC ADD 15 MINUTES (ANESTHESIA): Performed by: INTERNAL MEDICINE

## 2024-02-22 PROCEDURE — 87205 SMEAR GRAM STAIN: CPT

## 2024-02-22 PROCEDURE — 3609010800 HC BRONCHOSCOPY ALVEOLAR LAVAGE: Performed by: INTERNAL MEDICINE

## 2024-02-22 PROCEDURE — 87116 MYCOBACTERIA CULTURE: CPT

## 2024-02-22 PROCEDURE — 87206 SMEAR FLUORESCENT/ACID STAI: CPT

## 2024-02-22 PROCEDURE — 6360000002 HC RX W HCPCS: Performed by: NURSE ANESTHETIST, CERTIFIED REGISTERED

## 2024-02-22 PROCEDURE — 85049 AUTOMATED PLATELET COUNT: CPT

## 2024-02-22 PROCEDURE — 71045 X-RAY EXAM CHEST 1 VIEW: CPT

## 2024-02-22 PROCEDURE — 31628 BRONCHOSCOPY/LUNG BX EACH: CPT | Performed by: INTERNAL MEDICINE

## 2024-02-22 PROCEDURE — 7100000011 HC PHASE II RECOVERY - ADDTL 15 MIN: Performed by: INTERNAL MEDICINE

## 2024-02-22 PROCEDURE — 2500000003 HC RX 250 WO HCPCS: Performed by: NURSE ANESTHETIST, CERTIFIED REGISTERED

## 2024-02-22 PROCEDURE — 7100000000 HC PACU RECOVERY - FIRST 15 MIN: Performed by: INTERNAL MEDICINE

## 2024-02-22 PROCEDURE — 31627 NAVIGATIONAL BRONCHOSCOPY: CPT | Performed by: INTERNAL MEDICINE

## 2024-02-22 PROCEDURE — 2709999900 HC NON-CHARGEABLE SUPPLY: Performed by: INTERNAL MEDICINE

## 2024-02-22 PROCEDURE — 87015 SPECIMEN INFECT AGNT CONCNTJ: CPT

## 2024-02-22 PROCEDURE — 89051 BODY FLUID CELL COUNT: CPT

## 2024-02-22 PROCEDURE — 88173 CYTOPATH EVAL FNA REPORT: CPT

## 2024-02-22 PROCEDURE — 3609011100 HC BRONCHOSCOPY BRUSHINGS: Performed by: INTERNAL MEDICINE

## 2024-02-22 PROCEDURE — 31624 DX BRONCHOSCOPE/LAVAGE: CPT | Performed by: INTERNAL MEDICINE

## 2024-02-22 PROCEDURE — 7100000001 HC PACU RECOVERY - ADDTL 15 MIN: Performed by: INTERNAL MEDICINE

## 2024-02-22 PROCEDURE — 87102 FUNGUS ISOLATION CULTURE: CPT

## 2024-02-22 PROCEDURE — 87070 CULTURE OTHR SPECIMN AEROBIC: CPT

## 2024-02-22 PROCEDURE — 88172 CYTP DX EVAL FNA 1ST EA SITE: CPT

## 2024-02-22 PROCEDURE — 31623 DX BRONCHOSCOPE/BRUSH: CPT | Performed by: INTERNAL MEDICINE

## 2024-02-22 PROCEDURE — 85730 THROMBOPLASTIN TIME PARTIAL: CPT

## 2024-02-22 RX ORDER — LIDOCAINE HYDROCHLORIDE 20 MG/ML
INJECTION, SOLUTION INTRAVENOUS PRN
Status: DISCONTINUED | OUTPATIENT
Start: 2024-02-22 | End: 2024-02-22 | Stop reason: SDUPTHER

## 2024-02-22 RX ORDER — SODIUM CHLORIDE 0.9 % (FLUSH) 0.9 %
5-40 SYRINGE (ML) INJECTION PRN
Status: DISCONTINUED | OUTPATIENT
Start: 2024-02-22 | End: 2024-02-22 | Stop reason: HOSPADM

## 2024-02-22 RX ORDER — ONDANSETRON 2 MG/ML
INJECTION INTRAMUSCULAR; INTRAVENOUS PRN
Status: DISCONTINUED | OUTPATIENT
Start: 2024-02-22 | End: 2024-02-22 | Stop reason: SDUPTHER

## 2024-02-22 RX ORDER — SODIUM CHLORIDE 9 MG/ML
INJECTION, SOLUTION INTRAVENOUS CONTINUOUS PRN
Status: DISCONTINUED | OUTPATIENT
Start: 2024-02-22 | End: 2024-02-22 | Stop reason: SDUPTHER

## 2024-02-22 RX ORDER — SODIUM CHLORIDE 0.9 % (FLUSH) 0.9 %
5-40 SYRINGE (ML) INJECTION EVERY 12 HOURS SCHEDULED
Status: DISCONTINUED | OUTPATIENT
Start: 2024-02-22 | End: 2024-02-22 | Stop reason: HOSPADM

## 2024-02-22 RX ORDER — DEXMEDETOMIDINE HYDROCHLORIDE 100 UG/ML
INJECTION, SOLUTION INTRAVENOUS PRN
Status: DISCONTINUED | OUTPATIENT
Start: 2024-02-22 | End: 2024-02-22 | Stop reason: SDUPTHER

## 2024-02-22 RX ORDER — DEXAMETHASONE SODIUM PHOSPHATE 10 MG/ML
INJECTION INTRAMUSCULAR; INTRAVENOUS PRN
Status: DISCONTINUED | OUTPATIENT
Start: 2024-02-22 | End: 2024-02-22 | Stop reason: SDUPTHER

## 2024-02-22 RX ORDER — PROPOFOL 10 MG/ML
INJECTION, EMULSION INTRAVENOUS PRN
Status: DISCONTINUED | OUTPATIENT
Start: 2024-02-22 | End: 2024-02-22 | Stop reason: SDUPTHER

## 2024-02-22 RX ORDER — SODIUM CHLORIDE 9 MG/ML
INJECTION, SOLUTION INTRAVENOUS PRN
Status: DISCONTINUED | OUTPATIENT
Start: 2024-02-22 | End: 2024-02-22 | Stop reason: HOSPADM

## 2024-02-22 RX ORDER — FENTANYL CITRATE 50 UG/ML
INJECTION, SOLUTION INTRAMUSCULAR; INTRAVENOUS PRN
Status: DISCONTINUED | OUTPATIENT
Start: 2024-02-22 | End: 2024-02-22 | Stop reason: SDUPTHER

## 2024-02-22 RX ORDER — SODIUM CHLORIDE 9 MG/ML
INJECTION, SOLUTION INTRAVENOUS CONTINUOUS
Status: DISCONTINUED | OUTPATIENT
Start: 2024-02-22 | End: 2024-02-22 | Stop reason: HOSPADM

## 2024-02-22 RX ORDER — ROCURONIUM BROMIDE 10 MG/ML
INJECTION, SOLUTION INTRAVENOUS PRN
Status: DISCONTINUED | OUTPATIENT
Start: 2024-02-22 | End: 2024-02-22 | Stop reason: SDUPTHER

## 2024-02-22 RX ORDER — GLYCOPYRROLATE 0.2 MG/ML
INJECTION INTRAMUSCULAR; INTRAVENOUS PRN
Status: DISCONTINUED | OUTPATIENT
Start: 2024-02-22 | End: 2024-02-22 | Stop reason: SDUPTHER

## 2024-02-22 RX ADMIN — SODIUM CHLORIDE: 9 INJECTION, SOLUTION INTRAVENOUS at 14:11

## 2024-02-22 RX ADMIN — ROCURONIUM BROMIDE 50 MG: 10 INJECTION, SOLUTION INTRAVENOUS at 14:27

## 2024-02-22 RX ADMIN — PROPOFOL 100 MG: 10 INJECTION, EMULSION INTRAVENOUS at 14:27

## 2024-02-22 RX ADMIN — DEXAMETHASONE SODIUM PHOSPHATE 10 MG: 10 INJECTION INTRAMUSCULAR; INTRAVENOUS at 14:27

## 2024-02-22 RX ADMIN — FENTANYL CITRATE 100 MCG: 50 INJECTION, SOLUTION INTRAMUSCULAR; INTRAVENOUS at 14:24

## 2024-02-22 RX ADMIN — ONDANSETRON HYDROCHLORIDE 4 MG: 2 SOLUTION INTRAMUSCULAR; INTRAVENOUS at 14:24

## 2024-02-22 RX ADMIN — LIDOCAINE HYDROCHLORIDE 100 MG: 20 INJECTION, SOLUTION INTRAVENOUS at 14:27

## 2024-02-22 RX ADMIN — SUGAMMADEX 200 MG: 100 INJECTION, SOLUTION INTRAVENOUS at 16:25

## 2024-02-22 RX ADMIN — DEXMEDETOMIDINE HCL 20 MCG: 100 INJECTION INTRAVENOUS at 14:38

## 2024-02-22 RX ADMIN — GLYCOPYRROLATE 0.2 MG: 0.2 INJECTION INTRAMUSCULAR; INTRAVENOUS at 14:28

## 2024-02-22 RX ADMIN — DEXMEDETOMIDINE HCL 12 MCG: 100 INJECTION INTRAVENOUS at 16:20

## 2024-02-22 RX ADMIN — ROCURONIUM BROMIDE 10 MG: 10 INJECTION, SOLUTION INTRAVENOUS at 16:14

## 2024-02-22 RX ADMIN — SODIUM CHLORIDE: 9 INJECTION, SOLUTION INTRAVENOUS at 15:07

## 2024-02-22 RX ADMIN — ROCURONIUM BROMIDE 10 MG: 10 INJECTION, SOLUTION INTRAVENOUS at 15:36

## 2024-02-22 RX ADMIN — PROPOFOL 150 MCG/KG/MIN: 10 INJECTION, EMULSION INTRAVENOUS at 14:29

## 2024-02-22 ASSESSMENT — LIFESTYLE VARIABLES: SMOKING_STATUS: 1

## 2024-02-22 ASSESSMENT — PAIN - FUNCTIONAL ASSESSMENT
PAIN_FUNCTIONAL_ASSESSMENT: NONE - DENIES PAIN
PAIN_FUNCTIONAL_ASSESSMENT: NONE - DENIES PAIN

## 2024-02-22 ASSESSMENT — ENCOUNTER SYMPTOMS: DYSPNEA ACTIVITY LEVEL: AFTER AMBULATING 2 FLIGHTS OF STAIRS

## 2024-02-22 NOTE — ANESTHESIA PRE PROCEDURE
Department of Anesthesiology  Preprocedure Note       Name:  Kenji Gan   Age:  63 y.o.  :  1960                                          MRN:  [id number]         Date:  2024      Surgeon: Surgeon(s):  Tristan Caruso MD    Procedure: Procedure(s):  BRONCHOSCOPY ENDOBRONCHIAL ULTRASOUND  BRONCHOSCOPY W/EBUS FNA ROBOTIC/CINE    Medications prior to admission:   Prior to Admission medications    Medication Sig Start Date End Date Taking? Authorizing Provider   oxyBUTYnin (DITROPAN-XL) 10 MG extended release tablet Take 1 tablet by mouth daily For bladder spasms 24   Mike Hsu MD   vitamin B-12 (CYANOCOBALAMIN) 1000 MCG tablet TAKE 1 TABLET BY MOUTH DAILY 24   Mike Hsu MD   buPROPion (WELLBUTRIN XL) 300 MG extended release tablet TAKE 1 TABLET BY MOUTH EVERY MORNING 24   Mike Hsu MD   GNP EARWAX REMOVAL DROPS 6.5 % otic solution INSTILL 5 DROPS INTO LEFT EAR AS NEEDED FOR EAR WAX 23   Mike Hsu MD   lisinopril-hydroCHLOROthiazide (PRINZIDE;ZESTORETIC) 20-12.5 MG per tablet TAKE 1 TABLET BY MOUTH DAILY 10/18/23   Mike Hsu MD   PARoxetine (PAXIL) 10 MG tablet TAKE 1 TABLET BY MOUTH DAILY 23   Mike Hsu MD   SPIRIVA HANDIHALER 18 MCG inhalation capsule INHALE THE CONTENTS OF ONE (1) CAPSULE BY MOUTH VIA HANDIHALER ONCE DAILY AS DIRECTED *DO NOT SWALLOW CAPSULE* 23   Mike Hsu MD   albuterol (PROVENTIL) (2.5 MG/3ML) 0.083% nebulizer solution INHALE 1 VIAL VIA NEBULIZER EVERY 6 HOURS AS NEEDED FOR WHEEZING 23   Bhumi Nguyen MD   VENTOLIN  (90 Base) MCG/ACT inhaler INHALE TWO (2) PUFFS BY MOUTH EVERY 6 HOURS AS NEEDED FOR SHORTNESS OF BREATH OR WHEEZING 23   Bhumi Nguyen MD   ADVAIR DISKUS 250-50 MCG/ACT AEPB diskus inhaler INHALE ONE (1) PUFF BY MOUTH TWICE DAILY (MORNING AND EVENING APPROXIMATELY 12 HOURS APART) *RINSE MOUTH & GARGLE AFTER USE*

## 2024-02-22 NOTE — ANESTHESIA POSTPROCEDURE EVALUATION
Department of Anesthesiology  Postprocedure Note    Patient: Kenji Gan  MRN: [id number]  YOB: 1960  Date of evaluation: 2/22/2024    Procedure Summary       Date: 02/22/24 Room / Location: [place] [address] / [facility]    Anesthesia Start: 1411 Anesthesia Stop: 1642    Procedures:       BRONCHOSCOPY ENDOBRONCHIAL ULTRASOUND      BRONCHOSCOPY W/EBUS FNA ROBOTIC/CINE      BRONCHOSCOPY ALVEOLAR LAVAGE      BRONCHOSCOPY BRUSHINGS      BRONCHOSCOPY BIOPSY BRONCHUS Diagnosis:       Mass of upper lobe of right lung      (Mass of upper lobe of right lung [R91.8])    Surgeons: Tristan Caruso MD Responsible Provider: Crystal Del Castillo MD    Anesthesia Type: General ASA Status: 3            Anesthesia Type: General    Juan Luis Phase I: Juan Luis Score: 10    Juan Luis Phase II: Juan Luis Score: 10    Anesthesia Post Evaluation    Patient location during evaluation: PACU  Patient participation: complete - patient participated  Level of consciousness: awake and alert  Airway patency: patent  Nausea & Vomiting: no nausea and no vomiting  Cardiovascular status: hemodynamically stable  Respiratory status: acceptable  Hydration status: euvolemic  Pain management: satisfactory to patient    No notable events documented.

## 2024-02-23 LAB
APPEARANCE BRONCH: NORMAL
CLOT CHECK: NORMAL
COLOR BRONCH: NORMAL
EOSINOPHILS BAL: 3 %
LYMPHOCYTES, BAL: 35 %
MACROPHAGES, BAL: 40 %
RBC, BAL: NORMAL CELLS/UL
SEGMENTED NEUTROPHILS, BAL: 22 %
SPECIMEN TYPE: NORMAL
TOTAL CELLS COUNTED BRONCH: 38 CELLS/UL

## 2024-02-24 LAB
MICROORGANISM SPEC CULT: ABNORMAL
MICROORGANISM/AGENT SPEC: ABNORMAL
SPECIMEN DESCRIPTION: ABNORMAL

## 2024-02-26 LAB — NON-GYN CYTOLOGY REPORT: NORMAL

## 2024-02-26 NOTE — H&P
in her father and mother.      ROS: Positive Response in Bold, Otherwise Negative  Constitutional: Change in Appetite, Change in Weight, Fever, Chills, Weakness, Fatigue  Eyes/Head: Change in Vision, Headache, Dizziness, Diplopia  ENT: Change in Hearing, tinnitus, epistaxis, sore throat   Respiratory: SOB, Cough, Wheezing, Sputum  CVS: Chest Pain, Edema, Syncope, Palpitations, URBANO  GI: Indigestion, N/V, Diarrhea, Constipation  :Dysuria, Hematuria, Nocturia  Endocrine:Polydipsia, Polyuria, Cold intolerance  Neurological: Seizures, Motor weakness, slurred speech, headaches, Change Gait  Heme/Lymph: Anemia, Bruise early, Swollen glands   Musculoskeletal: Arthralgia, Gout, Back Pain, Stiffness  Psychiatric: Nervous, Insomnia, Stress, Depression    O:  BP 97/60   Pulse 84   Temp 98.2 °F (36.8 °C)   Resp 23   Ht 1.6 m (5' 3\")   Wt 47.2 kg (104 lb)   LMP  (LMP Unknown)   SpO2 (!) 79%   BMI 18.42 kg/m²     PE:  General         AAOx3, patient compliant with exam, in no acute distress  HEENT  NCAT, PERRLA, EOMI, mucous membranes moist and pink, no conjuctival  injection,   Neck        Supple, No JVD, no lymphadenopathy, no thyromegaly, no masses  Cardiovascular        No visualized heaves/lifts or palbable thrills, RRR, S1S2, No M/R/G  Pulmonary    Adequate and equal lung expansion, no use of accesory muscles, no      discrepancies noted on palpation/percussion, CTA b/l, no R/R/W  Abdomen:        + BS x 4, soft, NTND, no organomegaly  Extremities        + Radial pulses b/l, + PT/DP pulses b/l, no C/C/E x 4  Neurological      CN II-XII grossly intact b/l, no focal nerve deficits noted, Strength 5/5 in all 4    Extremities, Reflexes 2/4 throughout.        Objective:  Vital signs: (most recent): Blood pressure 97/60, pulse 84, temperature 98.2 °F (36.8 °C), resp. rate 23, height 1.6 m (5' 3\"), weight 47.2 kg (104 lb), SpO2 (!) 79 %, not currently breastfeeding.              Imaging    Reviewed     Assessment  Right

## 2024-02-27 LAB
MICROORGANISM SPEC CULT: NORMAL
MICROORGANISM/AGENT SPEC: NORMAL
SPECIMEN DESCRIPTION: NORMAL

## 2024-02-27 NOTE — PROGRESS NOTES
cancer, blood clots     REVIEW OF SYSTEMS:  Constitutional: No fevers, chills, unintentional weight loss  Skin: No rashes or lesions  EENT: No change in vision, change in hearing, change in taste, change in smell  Cardiovascular: Denies chest pain, chest pressure, palpitations  Respiration: Denies wheezing, shortness of breath, denies cough, denies hemoptysis  Gastrointestinal: Denies nausea, vomiting, diarrhea  Musculoskeletal: Denies joint or muscle pain  Neurological: Denies syncope, headache, seizures  Psychological: Denies anxiety or depression  Endocrine: Denies polyuria polydipsia  Hematopoietic/lymphatic: Denies easy bruising        PHYSICAL EXAMINATION:  Constitutional: Well-nourished, well-developed.  No acute distress  EENT: PERRL, EOMI, no oropharyngeal erythema.  No palpable adenopathy  Neck: Trachea and thyroid midline  Respiratory: Overall diminished bilaterally  Cardiovascular: Regular rate and rhythm, no murmurs rubs or gallops  Pulses: Equal bilaterally  Abdomen: Soft nontender bowel sounds present  Lymphatic: No palpable adenopathy  Musculoskeletal: Gait steady  Extremities: No clubbing, cyanosis, edema.  Skin: No rashes or lesions  Neurological/Psychiatric: Neurologically intact, no focal deficits.  Affect appropriate    DATA: Spirometry not completed today.  Prior spirometry consistent with obstruction.    IMPRESSION:       1.  COPD  2.  Lung masses highly concerning for lung cancer possibly with metastasis. Concern for synchronous primary non-small cell lung cancer           PLAN:      1.  Continue current inhalers and nebulized therapy.  2.  Will refer to Dr. Quinteros for bronchoscopy, endobronchial ultrasound, and navigational bronchoscopy with Galaxy system.  The patient will require a repeat CT with Galaxy protocol.  3.  Smoking cessation extensively discussed.    I personally discussed with the patient risks and benefits of bronchoscopy including benefit of adequately getting a tissue

## 2024-02-28 LAB — SURGICAL PATHOLOGY REPORT: NORMAL

## 2024-03-01 DIAGNOSIS — C34.90 MALIGNANT NEOPLASM OF LUNG, UNSPECIFIED LATERALITY, UNSPECIFIED PART OF LUNG (HCC): Primary | ICD-10-CM

## 2024-03-04 LAB
MICROORGANISM SPEC CULT: NORMAL
MICROORGANISM/AGENT SPEC: NORMAL
SPECIMEN DESCRIPTION: NORMAL

## 2024-03-07 ENCOUNTER — OFFICE VISIT (OUTPATIENT)
Dept: ONCOLOGY | Age: 64
End: 2024-03-07
Payer: COMMERCIAL

## 2024-03-07 ENCOUNTER — HOSPITAL ENCOUNTER (OUTPATIENT)
Dept: INFUSION THERAPY | Age: 64
Discharge: HOME | End: 2024-03-07
Payer: COMMERCIAL

## 2024-03-07 VITALS
DIASTOLIC BLOOD PRESSURE: 57 MMHG | HEIGHT: 63 IN | TEMPERATURE: 97.9 F | WEIGHT: 101.8 LBS | SYSTOLIC BLOOD PRESSURE: 120 MMHG | OXYGEN SATURATION: 96 % | BODY MASS INDEX: 18.04 KG/M2 | HEART RATE: 101 BPM

## 2024-03-07 DIAGNOSIS — R91.8 LUNG MASS: Primary | ICD-10-CM

## 2024-03-07 DIAGNOSIS — C34.90 MALIGNANT NEOPLASM OF LUNG, UNSPECIFIED LATERALITY, UNSPECIFIED PART OF LUNG (HCC): ICD-10-CM

## 2024-03-07 LAB
ALBUMIN SERPL-MCNC: 4.6 G/DL (ref 3.5–5.2)
ALP SERPL-CCNC: 120 U/L (ref 35–104)
ALT SERPL-CCNC: 14 U/L (ref 0–32)
ANION GAP SERPL CALCULATED.3IONS-SCNC: 11 MMOL/L (ref 7–16)
AST SERPL-CCNC: 14 U/L (ref 0–31)
BASOPHILS # BLD: 0.08 K/UL (ref 0–0.2)
BASOPHILS NFR BLD: 1 % (ref 0–2)
BILIRUB SERPL-MCNC: 0.3 MG/DL (ref 0–1.2)
BUN SERPL-MCNC: 11 MG/DL (ref 6–23)
CALCIUM SERPL-MCNC: 9.4 MG/DL (ref 8.6–10.2)
CHLORIDE SERPL-SCNC: 91 MMOL/L (ref 98–107)
CO2 SERPL-SCNC: 24 MMOL/L (ref 22–29)
CREAT SERPL-MCNC: 0.6 MG/DL (ref 0.5–1)
EOSINOPHIL # BLD: 0.15 K/UL (ref 0.05–0.5)
EOSINOPHILS RELATIVE PERCENT: 2 % (ref 0–6)
ERYTHROCYTE [DISTWIDTH] IN BLOOD BY AUTOMATED COUNT: 13.2 % (ref 11.5–15)
GFR SERPL CREATININE-BSD FRML MDRD: >60 ML/MIN/1.73M2
GLUCOSE SERPL-MCNC: 94 MG/DL (ref 74–99)
HCT VFR BLD AUTO: 41.5 % (ref 34–48)
HGB BLD-MCNC: 14.4 G/DL (ref 11.5–15.5)
IMM GRANULOCYTES # BLD AUTO: 0.05 K/UL (ref 0–0.58)
IMM GRANULOCYTES NFR BLD: 1 % (ref 0–5)
LYMPHOCYTES NFR BLD: 3.2 K/UL (ref 1.5–4)
LYMPHOCYTES RELATIVE PERCENT: 36 % (ref 20–42)
MCH RBC QN AUTO: 32.3 PG (ref 26–35)
MCHC RBC AUTO-ENTMCNC: 34.7 G/DL (ref 32–34.5)
MCV RBC AUTO: 93 FL (ref 80–99.9)
MICROORGANISM SPEC CULT: ABNORMAL
MICROORGANISM SPEC CULT: ABNORMAL
MICROORGANISM/AGENT SPEC: ABNORMAL
MONOCYTES NFR BLD: 0.73 K/UL (ref 0.1–0.95)
MONOCYTES NFR BLD: 8 % (ref 2–12)
NEUTROPHILS NFR BLD: 53 % (ref 43–80)
NEUTS SEG NFR BLD: 4.78 K/UL (ref 1.8–7.3)
PLATELET # BLD AUTO: 381 K/UL (ref 130–450)
PMV BLD AUTO: 9.1 FL (ref 7–12)
POTASSIUM SERPL-SCNC: 4.1 MMOL/L (ref 3.5–5)
PROT SERPL-MCNC: 7.6 G/DL (ref 6.4–8.3)
RBC # BLD AUTO: 4.46 M/UL (ref 3.5–5.5)
SODIUM SERPL-SCNC: 126 MMOL/L (ref 132–146)
SPECIMEN DESCRIPTION: ABNORMAL
WBC OTHER # BLD: 9 K/UL (ref 4.5–11.5)

## 2024-03-07 PROCEDURE — 36415 COLL VENOUS BLD VENIPUNCTURE: CPT

## 2024-03-07 PROCEDURE — 85025 COMPLETE CBC W/AUTO DIFF WBC: CPT

## 2024-03-07 PROCEDURE — 99213 OFFICE O/P EST LOW 20 MIN: CPT | Performed by: STUDENT IN AN ORGANIZED HEALTH CARE EDUCATION/TRAINING PROGRAM

## 2024-03-07 PROCEDURE — 3074F SYST BP LT 130 MM HG: CPT | Performed by: STUDENT IN AN ORGANIZED HEALTH CARE EDUCATION/TRAINING PROGRAM

## 2024-03-07 PROCEDURE — G8482 FLU IMMUNIZE ORDER/ADMIN: HCPCS | Performed by: STUDENT IN AN ORGANIZED HEALTH CARE EDUCATION/TRAINING PROGRAM

## 2024-03-07 PROCEDURE — G8427 DOCREV CUR MEDS BY ELIG CLIN: HCPCS | Performed by: STUDENT IN AN ORGANIZED HEALTH CARE EDUCATION/TRAINING PROGRAM

## 2024-03-07 PROCEDURE — 3078F DIAST BP <80 MM HG: CPT | Performed by: STUDENT IN AN ORGANIZED HEALTH CARE EDUCATION/TRAINING PROGRAM

## 2024-03-07 PROCEDURE — 80053 COMPREHEN METABOLIC PANEL: CPT

## 2024-03-07 PROCEDURE — 3017F COLORECTAL CA SCREEN DOC REV: CPT | Performed by: STUDENT IN AN ORGANIZED HEALTH CARE EDUCATION/TRAINING PROGRAM

## 2024-03-07 PROCEDURE — G8419 CALC BMI OUT NRM PARAM NOF/U: HCPCS | Performed by: STUDENT IN AN ORGANIZED HEALTH CARE EDUCATION/TRAINING PROGRAM

## 2024-03-07 PROCEDURE — 4004F PT TOBACCO SCREEN RCVD TLK: CPT | Performed by: STUDENT IN AN ORGANIZED HEALTH CARE EDUCATION/TRAINING PROGRAM

## 2024-03-07 NOTE — PROGRESS NOTES
[facility] PHYSICIANS [facility] MED ONCOLOGY  [address]  [facility] [address]  Dept: [phone]  Loc: [phone]    Clinic Consultation Note      Date of Encounter: 03/07/2024     Referring Provider:  Adrian Yoon III, MD    Reason for Visit:   Right lung nodules/masses x2        PCP:  Mike Hsu MD    Demographics: 63 y.o. female    Chief Complaint   Patient presents with    Lung Cancer    Follow-up         Subjective:  Returns to review results. She was able to complete bronch with pulmonology.   No new symptoms otherwise.     HPI from Initial Outpatient Consultation  (01/25/2024):  The patient is a 63 y.o. female patient comes in for evaluation for right-sided lung nodules, 1 of which is located on the right upper lobe and the other being on the right lower lobe.  Per chart review, these nodules appear to have been detected on CT lung cancer screening about 1.5 years ago.  PET scan had been done on 7/6/2022, without significant FDG avid findings.    It was not until more recently the last few months, when CT chest suggested increase in size of nodules.  And PET scan now shows increased FDG avidity.  The patient has been evaluated by CT surgery, reported poor candidate for pneumonectomy.  She was sent to see radiation oncology, and then patient is meeting me today.    Otherwise patient denies of new or worrisome symptoms.  Denies of any pain.  She was accompanied by her sister today.        Review of Systems;  CONSTITUTIONAL :  No fever, chills, fatigue, night sweats, or unintended weight loss  EYES: No discharge, itchiness, pain, redness  ENMT: No congestion   RESPIRATORY: No shortness of breath; a little more cough.  CARDIOVASCULAR: No chest pain, palpitations  GASTROINTESTINAL: No nausea, vomiting, abdominal pain   GENITOURINARY: No dysuria, urinary frequency, hematuria  ENDOCRINE: No polydipsia, polyuria, cold or heat intolerance.  MUSCULOSKELETAL: No

## 2024-03-08 ENCOUNTER — TELEPHONE (OUTPATIENT)
Dept: PULMONOLOGY | Age: 64
End: 2024-03-08

## 2024-03-08 ENCOUNTER — TELEPHONE (OUTPATIENT)
Dept: CASE MANAGEMENT | Age: 64
End: 2024-03-08

## 2024-03-08 RX ORDER — LEVOFLOXACIN 500 MG/1
500 TABLET, FILM COATED ORAL DAILY
Qty: 7 TABLET | Refills: 0 | Status: SHIPPED | OUTPATIENT
Start: 2024-03-08 | End: 2024-03-15

## 2024-03-08 NOTE — TELEPHONE ENCOUNTER
Call to pt to follow up on results from Bronch. Dr Caruso has sent script for Levaquin over to pharmacy for pt to take for 7 days. He is awaiting another results and will call her next week if any changes. Pt verbalized understanding.

## 2024-03-08 NOTE — TELEPHONE ENCOUNTER
Dr. Melendez requesting patient's bronchoscopy pathology specimen be sent to [facility] for 2nd opinion.  Order obtained and fax to [facility] pathology department with fax confirmation received.  Denisha, in pathology department confirms that they received the order.

## 2024-03-11 LAB
MICROORGANISM SPEC CULT: NORMAL
MICROORGANISM/AGENT SPEC: NORMAL
SPECIMEN DESCRIPTION: NORMAL

## 2024-03-18 LAB
MICROORGANISM SPEC CULT: NORMAL
MICROORGANISM/AGENT SPEC: NORMAL
SPECIMEN DESCRIPTION: NORMAL

## 2024-03-25 ENCOUNTER — HOSPITAL ENCOUNTER (OUTPATIENT)
Dept: RADIATION ONCOLOGY | Age: 64
Discharge: HOME | End: 2024-03-25
Payer: COMMERCIAL

## 2024-03-25 LAB
MICROORGANISM SPEC CULT: NORMAL
MICROORGANISM/AGENT SPEC: NORMAL
SPECIMEN DESCRIPTION: NORMAL

## 2024-03-25 PROCEDURE — 77334 RADIATION TREATMENT AID(S): CPT | Performed by: RADIOLOGY

## 2024-04-01 LAB
MICROORGANISM SPEC CULT: NORMAL
MICROORGANISM/AGENT SPEC: NORMAL
SPECIMEN DESCRIPTION: NORMAL

## 2024-04-08 LAB
MICROORGANISM SPEC CULT: NORMAL
MICROORGANISM/AGENT SPEC: NORMAL
SPECIMEN DESCRIPTION: NORMAL

## 2024-04-11 ENCOUNTER — HOSPITAL ENCOUNTER (OUTPATIENT)
Dept: RADIATION ONCOLOGY | Age: 64
Discharge: HOME | End: 2024-04-11
Payer: COMMERCIAL

## 2024-04-11 PROCEDURE — 77338 DESIGN MLC DEVICE FOR IMRT: CPT | Performed by: RADIOLOGY

## 2024-04-11 PROCEDURE — 77301 RADIOTHERAPY DOSE PLAN IMRT: CPT | Performed by: RADIOLOGY

## 2024-04-11 PROCEDURE — 77293 RESPIRATOR MOTION MGMT SIMUL: CPT | Performed by: RADIOLOGY

## 2024-04-11 PROCEDURE — 77300 RADIATION THERAPY DOSE PLAN: CPT | Performed by: RADIOLOGY

## 2024-04-15 RX ORDER — FLUTICASONE PROPIONATE AND SALMETEROL 50; 250 UG/1; UG/1
POWDER RESPIRATORY (INHALATION)
Qty: 60 EACH | Refills: 10 | Status: SHIPPED | OUTPATIENT
Start: 2024-04-15

## 2024-04-15 NOTE — TELEPHONE ENCOUNTER
Last Appointment:  2/16/2024  Future Appointments   Date Time Provider Department Center   4/22/2024 11:20 AM Vee Estes,  ACC Pulm [facility]   4/25/2024  2:45 PM [facility] MED ONC FAST TRACK 3 [facility] Med Onc [facility]   4/25/2024  3:00 PM Hipolito Melendez MD MED ONC [facility]   5/17/2024  1:00 PM Mike Hsu MD [facility]

## 2024-04-22 ENCOUNTER — HOSPITAL ENCOUNTER (OUTPATIENT)
Dept: RADIATION ONCOLOGY | Age: 64
Discharge: HOME | End: 2024-04-22
Payer: COMMERCIAL

## 2024-04-22 PROCEDURE — 77373 STRTCTC BDY RAD THER TX DLVR: CPT | Performed by: RADIOLOGY

## 2024-04-22 PROCEDURE — NBSRV NON-BILLABLE SERVICE: Performed by: RADIOLOGY

## 2024-04-22 NOTE — PROGRESS NOTES
Radiation Oncology          Ms.Robin INES Gan underwent fractionated external beam radiation therapy using a SBRT / SABR technique.    I was personally present for the treatment planning (+/- 4D CT, W/WO Ab compression) imaging and pt setup to ensure appropriate immobilization to meet current MACIEJ standards. After a detailed review of the treatment plan and appropriate physics QA / oversight this pt was scheduled and underwent hypo fractionated treatment as noted per the D/I in Mos[place]q.  Typical fractionation schemes include but are not limited to 6000 Gy in 3-5 fractions.  The NCCN guidelines and pt workup including a detailed discussion of the risks, benefits and alternative were discussed previously [RTOG dose constraints met per plan as applicable- see Mosaiq].  The pt verbalized understanding for the risks of this procedure today prior to Tx.   Today, after a dual identification time out (including a brief plan overview )- I personally reviewed the complex multifaceted immobilization apparatus W/WO compression +/- Synergy (PRN), patient position, and 4D imaging (if applicable) prior to the treatment delivery to ensure accuracy.  The SBRT team, including myself, were all present for the set up CT scan and delivery of the fraction (the latter on a PRN basis).  The patient successfully completed the procedure today in stable condition; this procedure was well tolerated today.         Kenji Gan has now received 1200 cGy in 1/2 fractions directed to the single ISO 2 lesion SBRT.        Adrian Yoon III, MD MS [facility]  Radiation Oncology  Cell: [phone]    [facility]:  [phone]   FAX: [phone]  [facility]:  [phone]   FAX:    [phone]  [facility]:  [phone]   FAX:  [phone]

## 2024-04-24 ENCOUNTER — HOSPITAL ENCOUNTER (OUTPATIENT)
Dept: RADIATION ONCOLOGY | Age: 64
Discharge: HOME | End: 2024-04-24
Payer: COMMERCIAL

## 2024-04-24 VITALS
TEMPERATURE: 97.6 F | RESPIRATION RATE: 18 BRPM | DIASTOLIC BLOOD PRESSURE: 46 MMHG | BODY MASS INDEX: 18.79 KG/M2 | OXYGEN SATURATION: 98 % | HEART RATE: 100 BPM | SYSTOLIC BLOOD PRESSURE: 109 MMHG | WEIGHT: 106.1 LBS

## 2024-04-24 DIAGNOSIS — C34.81 MALIGNANT NEOPLASM OF OVERLAPPING SITES OF RIGHT LUNG (HCC): Primary | ICD-10-CM

## 2024-04-24 PROCEDURE — NBSRV NON-BILLABLE SERVICE: Performed by: RADIOLOGY

## 2024-04-24 PROCEDURE — 77373 STRTCTC BDY RAD THER TX DLVR: CPT | Performed by: RADIOLOGY

## 2024-04-24 NOTE — PATIENT INSTRUCTIONS
Continue daily fractionated radiation therapy as scheduled. Please see weekly OTV note and intial consultation letter in EPIC for clinical details.        Adrian Yoon III, MD MS DABR    [facility]:  [phone]   FAX: [phone]  [facility]:  [phone]   FAX:    [phone]  [facility]:  [phone]   FAX:  [phone]  Email: [email or web address]

## 2024-04-24 NOTE — ADDENDUM NOTE
Encounter addended by: Melissa Beasley RN on: 4/24/2024 9:36 AM   Actions taken: Flowsheet accepted, Order Reconciliation Section accessed, Medication List reviewed, Clinical Note Signed

## 2024-04-24 NOTE — PROGRESS NOTES
Radiation Oncology          Ms.Robin INES Gan underwent fractionated external beam radiation therapy using a SBRT / SABR technique.    I was personally present for the treatment planning (+/- 4D CT, W/WO Ab compression) imaging and pt setup to ensure appropriate immobilization to meet current MACIEJ standards. After a detailed review of the treatment plan and appropriate physics QA / oversight this pt was scheduled and underwent hypo fractionated treatment as noted per the D/I in [place].  Typical fractionation schemes include but are not limited to 6000 Gy in 3-5 fractions.  The NCCN guidelines and pt workup including a detailed discussion of the risks, benefits and alternative were discussed previously [RTOG dose constraints met per plan as applicable- see Mosaiq].  The pt verbalized understanding for the risks of this procedure today prior to Tx.   Today, after a dual identification time out (including a brief plan overview )- I personally reviewed the complex multifaceted immobilization apparatus W/WO compression +/- Synergy (PRN), patient position, and 4D imaging (if applicable) prior to the treatment delivery to ensure accuracy.  The SBRT team, including myself, were all present for the set up CT scan and delivery of the fraction (the latter on a PRN basis).  The patient successfully completed the procedure today in stable condition; this procedure was well tolerated today.         Kenji Gan has now received 2400 cGy in 2/5 fractions directed to the right lung lesions.        Adrian Yoon III, MD MS [facility]  Radiation Oncology  Cell: [phone]    [facility]:  [phone]   FAX: [phone]  [facility]:  [phone]   FAX:    [phone]  [facility]:  [phone]   FAX:  [phone]              
Kenji Gan  2024  Wt Readings from Last 3 Encounters:   24 48.1 kg (106 lb 1.6 oz)   24 46.2 kg (101 lb 12.8 oz)   24 47.2 kg (104 lb)     Body mass index is 18.79 kg/m².        Treatment Area:RUL + RML    Patient was seen today for weekly visit.      Comfort Alteration  KPS:80%  Fatigue: Moderate    Ventilation Alterations  Cough: Yes  Hemoptysis: No  Mucus Color: clear/yellowish/gray  Dyspnea: No  O2 Sat: 98%    Nutritional Alteration  Anorexia: No  Nausea: Yes   Vomiting: No     Skin Alteration   Sensation:Good    Radiation Dermatitis:  no    Mucous Membrane Alteration  Voice Changes/ Stridor/Larynx: no  Pharynx & Esophagus: litte bit, but nothing new since starting RT    Elimination Alterations  Constipation: yes, but under control  Diarrhea:  yes, under control, dietary changes      Emotional  Coping: effective      Injury, potential bleeding or infection: no    Other:na    Lab Results   Component Value Date    WBC 9.0 2024    HGB 14.4 2024    HCT 41.5 2024     2024         BP (!) 109/46   Pulse 100   Temp 97.6 °F (36.4 °C) (Temporal)   Resp 18   Wt 48.1 kg (106 lb 1.6 oz)   LMP  (LMP Unknown)   SpO2 98%   BMI 18.79 kg/m²   BP within normal range? no   -if no, manually recheck in 5-10 min  NEW BP readin/60  BP within normal range? yes       Assessment/Plan:2/5fx;2400/6000cGy completed.    Melissa Beasley RN      
Examination: General appearance - alert, well appearing, and in no distress.            Wt Readings from Last 3 Encounters:   03/07/24 46.2 kg (101 lb 12.8 oz)   02/22/24 47.2 kg (104 lb)   02/16/24 47.2 kg (104 lb)         ASSESSMENT/PLAN:     Patient is tolerating treatments well with expected toxicities. RBA were reviewed prior to first fraction and PRN.    Current and planned dose reviewed. Goals of treatment and potential side effects were reviewed with the patient PRN. Treatment imaging has been personally reviewed for accuracy and precision.    Questions answered to apparent satisfaction.    Treatments will continue as planned.        Adrian Yoon III, MD MS DABR  Radiation Oncologist        [facility] ([facility]): [phone] /// FAX: [phone]  MARKEL Mcrae): [phone] /// FAX: [phone]  SYDNIE Hung): [phone] /// FAX: [phone]

## 2024-04-25 ENCOUNTER — HOSPITAL ENCOUNTER (OUTPATIENT)
Dept: INFUSION THERAPY | Age: 64
End: 2024-04-25

## 2024-04-26 ENCOUNTER — HOSPITAL ENCOUNTER (OUTPATIENT)
Dept: RADIATION ONCOLOGY | Age: 64
Discharge: HOME | End: 2024-04-26
Payer: COMMERCIAL

## 2024-04-26 PROCEDURE — 77373 STRTCTC BDY RAD THER TX DLVR: CPT | Performed by: RADIOLOGY

## 2024-04-26 NOTE — PROGRESS NOTES
Radiation Oncology          Ms.Robin INES Gan underwent fractionated external beam radiation therapy using a SBRT / SABR technique.    I was personally present for the treatment planning (+/- 4D CT, W/WO Ab compression) imaging and pt setup to ensure appropriate immobilization to meet current MACIEJ standards. After a detailed review of the treatment plan and appropriate physics QA / oversight this pt was scheduled and underwent hypo fractionated treatment as noted per the D/I in [place].  Typical fractionation schemes include but are not limited to 6000 Gy in 3-5 fractions.  The NCCN guidelines and pt workup including a detailed discussion of the risks, benefits and alternative were discussed previously [RTOG dose constraints met per plan as applicable- see Mosaiq].  The pt verbalized understanding for the risks of this procedure today prior to Tx.   Today, after a dual identification time out (including a brief plan overview )- I personally reviewed the complex multifaceted immobilization apparatus W/WO compression +/- Synergy (PRN), patient position, and 4D imaging (if applicable) prior to the treatment delivery to ensure accuracy.  The SBRT team, including myself, were all present for the set up CT scan and delivery of the fraction (the latter on a PRN basis).  The patient successfully completed the procedure today in stable condition; this procedure was well tolerated today.         Kenji Gan has now received 3600 cGy in 3/5 fractions directed to the 1 ISO 2 lesion RUL RML.        Adrian Yoon III, MD MS [facility]  Radiation Oncology  Cell: [phone]    [facility]:  [phone]   FAX: [phone]  [facility]:  [phone]   FAX:    [phone]  [facility]:  [phone]   FAX:  [phone]

## 2024-04-26 NOTE — PATIENT INSTRUCTIONS
Continue daily fractionated radiation therapy as scheduled. Please see weekly OTV note and intial consultation letter in EPIC for clinical details.        Adrian Yoon III, MD MS DABR    [facility]:  [phone]   FAX: [phone]  [facility]:  [phone]   FAX:    [phone]  [facility]:  [phone]   FAX:  [phone]  Email: [email or web address]

## 2024-04-29 ENCOUNTER — APPOINTMENT (OUTPATIENT)
Dept: RADIATION ONCOLOGY | Age: 64
End: 2024-04-29
Payer: COMMERCIAL

## 2024-04-29 PROCEDURE — 77373 STRTCTC BDY RAD THER TX DLVR: CPT | Performed by: RADIOLOGY

## 2024-04-29 PROCEDURE — NBSRV NON-BILLABLE SERVICE: Performed by: RADIOLOGY

## 2024-04-29 NOTE — PROGRESS NOTES
Radiation Oncology          Ms.Robin INES Gan underwent fractionated external beam radiation therapy using a SBRT / SABR technique.    I was personally present for the treatment planning (+/- 4D CT, W/WO Ab compression) imaging and pt setup to ensure appropriate immobilization to meet current MACIEJ standards. After a detailed review of the treatment plan and appropriate physics QA / oversight this pt was scheduled and underwent hypo fractionated treatment as noted per the D/I in Mosaiq.  Typical fractionation schemes include but are not limited to 6000 Gy in 3-5 fractions.  The NCCN guidelines and pt workup including a detailed discussion of the risks, benefits and alternative were discussed previously [RTOG dose constraints met per plan as applicable- see Mosaiq].  The pt verbalized understanding for the risks of this procedure today prior to Tx.   Today, after a dual identification time out (including a brief plan overview )- I personally reviewed the complex multifaceted immobilization apparatus W/WO compression +/- Synergy (PRN), patient position, and 4D imaging (if applicable) prior to the treatment delivery to ensure accuracy.  The SBRT team, including myself, were all present for the set up CT scan and delivery of the fraction (the latter on a PRN basis).  The patient successfully completed the procedure today in stable condition; this procedure was well tolerated today.         Kenji Gan has now received 4800 cGy in 4/5 fractions directed to the RUL RML 1 ISO.        Adrian Yoon III, MD MS [facility]  Radiation Oncology  Cell: [phone]    [facility]:  [phone]   FAX: [phone]  [facility]:  [phone]   FAX:    [phone]  [facility]:  [phone]   FAX:  [phone]

## 2024-05-01 ENCOUNTER — HOSPITAL ENCOUNTER (OUTPATIENT)
Dept: RADIATION ONCOLOGY | Age: 64
Discharge: HOME | End: 2024-05-01
Payer: COMMERCIAL

## 2024-05-01 VITALS
RESPIRATION RATE: 18 BRPM | TEMPERATURE: 97.2 F | SYSTOLIC BLOOD PRESSURE: 130 MMHG | WEIGHT: 101.9 LBS | BODY MASS INDEX: 18.05 KG/M2 | DIASTOLIC BLOOD PRESSURE: 66 MMHG | OXYGEN SATURATION: 98 % | HEART RATE: 85 BPM

## 2024-05-01 DIAGNOSIS — C34.82 MALIGNANT NEOPLASM OF OVERLAPPING SITES OF LEFT LUNG (HCC): Primary | ICD-10-CM

## 2024-05-01 PROCEDURE — 77373 STRTCTC BDY RAD THER TX DLVR: CPT | Performed by: RADIOLOGY

## 2024-05-01 PROCEDURE — 77336 RADIATION PHYSICS CONSULT: CPT | Performed by: RADIOLOGY

## 2024-05-01 PROCEDURE — 77435 SBRT MANAGEMENT: CPT | Performed by: RADIOLOGY

## 2024-05-01 NOTE — PROGRESS NOTES
DEPARTMENT OF RADIATION ONCOLOGY ON TREATMENT VISIT         5/1/2024      NAME:  Kenji Gan    YOB: 1960    Diagnosis: lung cancer    SUBJECTIVE:   Kenji Gan has now received fractionated external beam radiation therapy - ongoing.    Past medical, surgical, social and family histories reviewed and updated as indicated.    Pain: controlled    ALLERGIES:  Patient has no known allergies.         Current Outpatient Medications   Medication Sig Dispense Refill    ADVAIR DISKUS 250-50 MCG/ACT AEPB diskus inhaler INHALE ONE (1) PUFF BY MOUTH TWICE DAILY IN THE MORNING AND EVENING APPROXIMATELY 12 HOURS APART *RINSE MOUTH AND GARGLE AFTER USE* 60 each 10    oxyBUTYnin (DITROPAN-XL) 10 MG extended release tablet Take 1 tablet by mouth daily For bladder spasms 30 tablet 10    vitamin B-12 (CYANOCOBALAMIN) 1000 MCG tablet TAKE 1 TABLET BY MOUTH DAILY 30 tablet 10    buPROPion (WELLBUTRIN XL) 300 MG extended release tablet TAKE 1 TABLET BY MOUTH EVERY MORNING 30 tablet 10    GNP EARWAX REMOVAL DROPS 6.5 % otic solution INSTILL 5 DROPS INTO LEFT EAR AS NEEDED FOR EAR WAX 15 mL 10    lisinopril-hydroCHLOROthiazide (PRINZIDE;ZESTORETIC) 20-12.5 MG per tablet TAKE 1 TABLET BY MOUTH DAILY 30 tablet 10    PARoxetine (PAXIL) 10 MG tablet TAKE 1 TABLET BY MOUTH DAILY 30 tablet 10    SPIRIVA HANDIHALER 18 MCG inhalation capsule INHALE THE CONTENTS OF ONE (1) CAPSULE BY MOUTH VIA HANDIHALER ONCE DAILY AS DIRECTED *DO NOT SWALLOW CAPSULE* 30 capsule 10    albuterol (PROVENTIL) (2.5 MG/3ML) 0.083% nebulizer solution INHALE 1 VIAL VIA NEBULIZER EVERY 6 HOURS AS NEEDED FOR WHEEZING 360 mL 10    VENTOLIN  (90 Base) MCG/ACT inhaler INHALE TWO (2) PUFFS BY MOUTH EVERY 6 HOURS AS NEEDED FOR SHORTNESS OF BREATH OR WHEEZING 18 g 10     No current facility-administered medications for this encounter.           OBJECTIVE:  Alert and fully ambulatory. Pleasant and conversant.        Physical Examination:

## 2024-05-01 NOTE — ADDENDUM NOTE
Encounter addended by: Melissa Beasley RN on: 5/1/2024 10:16 AM   Actions taken: Flowsheet accepted, Order Reconciliation Section accessed, Medication List reviewed, Clinical Note Signed

## 2024-05-01 NOTE — PROGRESS NOTES
Kenji INES Gan  5/1/2024  Wt Readings from Last 3 Encounters:   05/01/24 46.2 kg (101 lb 14.4 oz)   04/24/24 48.1 kg (106 lb 1.6 oz)   03/07/24 46.2 kg (101 lb 12.8 oz)     Body mass index is 18.05 kg/m².        Treatment Area:[facility]    Patient was seen today for weekly visit.      Comfort Alteration  KPS:80%  Fatigue: Moderate    Ventilation Alterations  Cough: Yes  Hemoptysis: No  Mucus Color: clear/yellowish/gray  Dyspnea: Yes, on exertion  O2 Sat: 99%    Nutritional Alteration  Anorexia: Yes  Nausea: Yes   Vomiting: Yes     Skin Alteration   Sensation:Good    Radiation Dermatitis:  no    Mucous Membrane Alteration  Voice Changes/ Stridor/Larynx: yes, raspy  Pharynx & Esophagus: no    Elimination Alterations  Constipation: yes  Diarrhea:  yes      Emotional  Coping: effective      Injury, potential bleeding or infection: no    Other:na    Lab Results   Component Value Date    WBC 9.0 03/07/2024    HGB 14.4 03/07/2024    HCT 41.5 03/07/2024     03/07/2024         /66   Pulse 85   Temp 97.2 °F (36.2 °C) (Temporal)   Resp 18   Wt 46.2 kg (101 lb 14.4 oz)   LMP  (LMP Unknown)   SpO2 98%   BMI 18.05 kg/m²   BP within normal range? yes       Assessment/Plan:5fx;6000cGy completed.    Melissa Beasley RN

## 2024-05-01 NOTE — PROGRESS NOTES
Radiation Oncology          Ms.Robin INES Gan underwent fractionated external beam radiation therapy using a SBRT / SABR technique.    I was personally present for the treatment planning (+/- 4D CT, W/WO Ab compression) imaging and pt setup to ensure appropriate immobilization to meet current MACIEJ standards. After a detailed review of the treatment plan and appropriate physics QA / oversight this pt was scheduled and underwent hypo fractionated treatment as noted per the D/I in [place]q.  Typical fractionation schemes include but are not limited to 5500 Gy in 3-5 fractions.  The NCCN guidelines and pt workup including a detailed discussion of the risks, benefits and alternative were discussed previously [RTOG dose constraints met per plan as applicable- see Mosaiq].  The pt verbalized understanding for the risks of this procedure today prior to Tx.   Today, after a dual identification time out (including a brief plan overview )- I personally reviewed the complex multifaceted immobilization apparatus W/WO compression +/- Synergy (PRN), patient position, and 4D imaging (if applicable) prior to the treatment delivery to ensure accuracy.  The SBRT team, including myself, were all present for the set up CT scan and delivery of the fraction (the latter on a PRN basis).  The patient successfully completed the procedure today in stable condition; this procedure was well tolerated today.         Kenji Gan has now received 5500 cGy in 5/5 fractions directed to the 2 lesions 1 ISO.        SBRT complete 5/1/24.  FU CT chest 3 mo.        Adrian Yoon III, MD MS [facility]  Radiation Oncology  Cell: [phone]    [facility]:  [phone]   FAX: [phone]  [facility]:  [phone]   FAX:    [phone]  [facility]:  [phone]   FAX:  [phone]

## 2024-05-02 ENCOUNTER — HOSPITAL ENCOUNTER (OUTPATIENT)
Dept: INFUSION THERAPY | Age: 64
Discharge: HOME | End: 2024-05-02
Payer: COMMERCIAL

## 2024-05-02 ENCOUNTER — OFFICE VISIT (OUTPATIENT)
Dept: ONCOLOGY | Age: 64
End: 2024-05-02
Payer: COMMERCIAL

## 2024-05-02 VITALS
WEIGHT: 107 LBS | TEMPERATURE: 97.2 F | OXYGEN SATURATION: 96 % | BODY MASS INDEX: 18.96 KG/M2 | DIASTOLIC BLOOD PRESSURE: 62 MMHG | HEIGHT: 63 IN | SYSTOLIC BLOOD PRESSURE: 142 MMHG | HEART RATE: 90 BPM

## 2024-05-02 DIAGNOSIS — R91.8 LUNG MASS: Primary | ICD-10-CM

## 2024-05-02 DIAGNOSIS — C34.90 MALIGNANT NEOPLASM OF LUNG, UNSPECIFIED LATERALITY, UNSPECIFIED PART OF LUNG (HCC): ICD-10-CM

## 2024-05-02 LAB
ALBUMIN SERPL-MCNC: 4.7 G/DL (ref 3.5–5.2)
ALP SERPL-CCNC: 106 U/L (ref 35–104)
ALT SERPL-CCNC: 16 U/L (ref 0–32)
ANION GAP SERPL CALCULATED.3IONS-SCNC: 13 MMOL/L (ref 7–16)
AST SERPL-CCNC: 19 U/L (ref 0–31)
BASOPHILS # BLD: 0.04 K/UL (ref 0–0.2)
BASOPHILS NFR BLD: 1 % (ref 0–2)
BILIRUB SERPL-MCNC: 0.3 MG/DL (ref 0–1.2)
BUN SERPL-MCNC: 7 MG/DL (ref 6–23)
CALCIUM SERPL-MCNC: 9.8 MG/DL (ref 8.6–10.2)
CHLORIDE SERPL-SCNC: 94 MMOL/L (ref 98–107)
CO2 SERPL-SCNC: 27 MMOL/L (ref 22–29)
CREAT SERPL-MCNC: 0.5 MG/DL (ref 0.5–1)
EOSINOPHIL # BLD: 0.11 K/UL (ref 0.05–0.5)
EOSINOPHILS RELATIVE PERCENT: 3 % (ref 0–6)
ERYTHROCYTE [DISTWIDTH] IN BLOOD BY AUTOMATED COUNT: 12.4 % (ref 11.5–15)
GFR, ESTIMATED: >90 ML/MIN/1.73M2
GLUCOSE SERPL-MCNC: 104 MG/DL (ref 74–99)
HCT VFR BLD AUTO: 41.8 % (ref 34–48)
HGB BLD-MCNC: 14.8 G/DL (ref 11.5–15.5)
IMM GRANULOCYTES # BLD AUTO: <0.03 K/UL (ref 0–0.58)
IMM GRANULOCYTES NFR BLD: 0 % (ref 0–5)
LYMPHOCYTES NFR BLD: 1.13 K/UL (ref 1.5–4)
LYMPHOCYTES RELATIVE PERCENT: 31 % (ref 20–42)
MCH RBC QN AUTO: 32 PG (ref 26–35)
MCHC RBC AUTO-ENTMCNC: 35.4 G/DL (ref 32–34.5)
MCV RBC AUTO: 90.3 FL (ref 80–99.9)
MONOCYTES NFR BLD: 0.38 K/UL (ref 0.1–0.95)
MONOCYTES NFR BLD: 10 % (ref 2–12)
NEUTROPHILS NFR BLD: 54 % (ref 43–80)
NEUTS SEG NFR BLD: 1.98 K/UL (ref 1.8–7.3)
PLATELET # BLD AUTO: 300 K/UL (ref 130–450)
PMV BLD AUTO: 9.1 FL (ref 7–12)
POTASSIUM SERPL-SCNC: 3.4 MMOL/L (ref 3.5–5)
PROT SERPL-MCNC: 7.5 G/DL (ref 6.4–8.3)
RBC # BLD AUTO: 4.63 M/UL (ref 3.5–5.5)
SODIUM SERPL-SCNC: 134 MMOL/L (ref 132–146)
WBC OTHER # BLD: 3.7 K/UL (ref 4.5–11.5)

## 2024-05-02 PROCEDURE — 85025 COMPLETE CBC W/AUTO DIFF WBC: CPT

## 2024-05-02 PROCEDURE — G8427 DOCREV CUR MEDS BY ELIG CLIN: HCPCS | Performed by: STUDENT IN AN ORGANIZED HEALTH CARE EDUCATION/TRAINING PROGRAM

## 2024-05-02 PROCEDURE — 99213 OFFICE O/P EST LOW 20 MIN: CPT | Performed by: STUDENT IN AN ORGANIZED HEALTH CARE EDUCATION/TRAINING PROGRAM

## 2024-05-02 PROCEDURE — 80053 COMPREHEN METABOLIC PANEL: CPT

## 2024-05-02 PROCEDURE — 3077F SYST BP >= 140 MM HG: CPT | Performed by: STUDENT IN AN ORGANIZED HEALTH CARE EDUCATION/TRAINING PROGRAM

## 2024-05-02 PROCEDURE — G8420 CALC BMI NORM PARAMETERS: HCPCS | Performed by: STUDENT IN AN ORGANIZED HEALTH CARE EDUCATION/TRAINING PROGRAM

## 2024-05-02 PROCEDURE — 3017F COLORECTAL CA SCREEN DOC REV: CPT | Performed by: STUDENT IN AN ORGANIZED HEALTH CARE EDUCATION/TRAINING PROGRAM

## 2024-05-02 PROCEDURE — 4004F PT TOBACCO SCREEN RCVD TLK: CPT | Performed by: STUDENT IN AN ORGANIZED HEALTH CARE EDUCATION/TRAINING PROGRAM

## 2024-05-02 PROCEDURE — 36415 COLL VENOUS BLD VENIPUNCTURE: CPT

## 2024-05-02 PROCEDURE — 3078F DIAST BP <80 MM HG: CPT | Performed by: STUDENT IN AN ORGANIZED HEALTH CARE EDUCATION/TRAINING PROGRAM

## 2024-05-04 ASSESSMENT — ENCOUNTER SYMPTOMS
SHORTNESS OF BREATH: 0
COUGH: 0
GASTROINTESTINAL NEGATIVE: 1

## 2024-05-04 NOTE — PROGRESS NOTES
[facility] PHYSICIANS [facility] MED ONCOLOGY  [address]  [facility] [address]  Dept: [phone]  Loc: [phone]    Clinic Progress Note      Date of Encounter: 05/02/2024     Referring Provider:  Adrian Yoon III, MD    Reason for Visit:   Right lung nodules/masses x2        PCP:  Mike Hsu MD    Demographics: 64 y.o. female    Chief Complaint   Patient presents with    Lung Cancer         Subjective:  Tolerated SBRT well without major issues.   Here to discuss next steps in management.     HPI from Initial Outpatient Consultation  (01/25/2024):  The patient is a 63 y.o. female patient comes in for evaluation for right-sided lung nodules, 1 of which is located on the right upper lobe and the other being on the right lower lobe.  Per chart review, these nodules appear to have been detected on CT lung cancer screening about 1.5 years ago.  PET scan had been done on 7/6/2022, without significant FDG avid findings.    It was not until more recently the last few months, when CT chest suggested increase in size of nodules.  And PET scan now shows increased FDG avidity.  The patient has been evaluated by CT surgery, reported poor candidate for pneumonectomy.  She was sent to see radiation oncology, and then patient is meeting me today.    Otherwise patient denies of new or worrisome symptoms.  Denies of any pain.  She was accompanied by her sister today.        Review of Systems   Constitutional: Negative.    HENT: Negative.     Eyes:  Negative for visual disturbance.   Respiratory:  Negative for cough and shortness of breath.    Cardiovascular: Negative.    Gastrointestinal: Negative.    Genitourinary: Negative.    Musculoskeletal: Negative.    Skin: Negative.    Neurological: Negative.  Negative for headaches.   Hematological: Negative.    Psychiatric/Behavioral: Negative.           Past Medical History:   Diagnosis Date    Bladder spasm     COPD (chronic

## 2024-05-13 DIAGNOSIS — J44.9 CHRONIC OBSTRUCTIVE PULMONARY DISEASE, UNSPECIFIED COPD TYPE (HCC): ICD-10-CM

## 2024-05-14 RX ORDER — ALBUTEROL SULFATE 90 UG/1
AEROSOL, METERED RESPIRATORY (INHALATION)
Qty: 18 G | Refills: 5 | Status: SHIPPED | OUTPATIENT
Start: 2024-05-14

## 2024-05-14 RX ORDER — ALBUTEROL SULFATE 2.5 MG/3ML
SOLUTION RESPIRATORY (INHALATION)
Qty: 360 ML | Refills: 5 | Status: SHIPPED | OUTPATIENT
Start: 2024-05-14

## 2024-05-14 NOTE — TELEPHONE ENCOUNTER
Last Appointment:  Visit date not found  Future Appointments   Date Time Provider Department Center   5/15/2024  9:40 AM Vee Estes, DO ACC Pulm [facility]   5/17/2024  1:00 PM Mike Hsu MD COLUMB BIRK [facility]   5/31/2024  9:30 AM Jena Royal, APRN - CNP YZ Rad Onc [facility]   8/8/2024 10:15 AM SEYZ MED ONC FAST TRACK 1 SEYZ Med Onc [facility]   8/8/2024 10:30 AM Hipolito Melendez MD MED ONC [facility]

## 2024-05-15 ENCOUNTER — OFFICE VISIT (OUTPATIENT)
Dept: PULMONOLOGY | Age: 64
End: 2024-05-15
Payer: COMMERCIAL

## 2024-05-15 VITALS
BODY MASS INDEX: 18.17 KG/M2 | DIASTOLIC BLOOD PRESSURE: 61 MMHG | SYSTOLIC BLOOD PRESSURE: 137 MMHG | HEART RATE: 85 BPM | OXYGEN SATURATION: 94 % | RESPIRATION RATE: 16 BRPM | TEMPERATURE: 97.3 F | WEIGHT: 102.6 LBS

## 2024-05-15 DIAGNOSIS — J44.9 CHRONIC OBSTRUCTIVE PULMONARY DISEASE, UNSPECIFIED COPD TYPE (HCC): Primary | ICD-10-CM

## 2024-05-15 DIAGNOSIS — C34.90 MALIGNANT NEOPLASM OF LUNG, UNSPECIFIED LATERALITY, UNSPECIFIED PART OF LUNG (HCC): ICD-10-CM

## 2024-05-15 DIAGNOSIS — R91.8 LUNG MASS: ICD-10-CM

## 2024-05-15 DIAGNOSIS — Z72.0 TOBACCO USE: ICD-10-CM

## 2024-05-15 LAB
EXPIRATORY TIME: 6.96 SEC
FEF 25-75% %PRED-PRE: 38 L/SEC
FEF 25-75% PRED: 2.06 L/SEC
FEF 25-75-PRE: 0.78 L/SEC
FEV1 %PRED-PRE: 72 %
FEV1 PRED: 2.63 L
FEV1/FVC %PRED-PRE: 71 %
FEV1/FVC PRED: 79 %
FEV1/FVC: 56 %
FEV1: 1.67 L
FVC %PRED-PRE: 101 %
FVC PRED: 2.93 L
FVC: 2.97 L
PEF %PRED-PRE: 66 L/SEC
PEF PRED: 5.9 L/SEC
PEF-PRE: 3.9 L/SEC

## 2024-05-15 PROCEDURE — 94010 BREATHING CAPACITY TEST: CPT | Performed by: INTERNAL MEDICINE

## 2024-05-15 ASSESSMENT — PULMONARY FUNCTION TESTS
FVC: 2.97
FEV1/FVC_PERCENT_PREDICTED_PRE: 71
FVC_PREDICTED: 2.93
FVC_PERCENT_PREDICTED_PRE: 101
FEV1/FVC: 56
FEV1: 1.67
FEV1_PREDICTED: 2.630
FEV1/FVC_PREDICTED: 79
FEV1_PERCENT_PREDICTED_PRE: 72

## 2024-05-15 NOTE — PATIENT INSTRUCTIONS
Vee Estes    [facility]  [address] [place] [id number]  Office: [phone]      Your were seen in the office today for follow up      We  did not make changes to your medications today.       Testing ordered today was none      Vaccines recommended none    Follow up in 6 months              Please do not hesitate to call the office with any questions.

## 2024-05-15 NOTE — PROGRESS NOTES
[facility] PHYSICIANS [facility]     HISTORY OF PRESENT ILLNESS:    Kenji Gan is a 64 y.o. year old female here for evaluation of lung nodule.  The patient has previously been seen by Dr. Brown and Dr. Yoon.  She is also had a consult with Dr. Salvador back in 2022 but then had been lost to follow-up.  She will be seeing Dr. Melendez soon.  Her current inhalers include Spiriva, a rescue inhaler, and Advair.  She is currently smoking about 1-1/2 packs of cigarettes a day.    She was seen by Dr. Mendoza on December 12.  She had had a previous PET scan that showed an irregular upper lobe mass measuring 2.6 x 1.6 compared to 1.4 x 2.6 cm previously with an SUV of 4.5.  Also an irregular nodule in the superior segment of the right upper lobe which was 1.1 cm with a SUV 2.7.    Currently denies symptoms of coughing, wheezing, shortness of breath.      Updated HPI as of May 15, 2024:  Patient seen and examined.  Since last appointment she had undergone bronchoscopy with navigational biopsy with .  The case was technically challenging and there was concern for a possible false negative and still a significantly high suspicion for malignancy.  Culture also showed strep pneumo and haemophilus.  She was treated for both of these.  She was evaluated by both medical oncology and radiation oncology and has completed a course of radiation.  She reports for me today that she is feeling better the last 5 days since she had in quite some time she feels like her appetite is back.  She remains on the Advair, Spiriva, uses the rescue inhaler between 0-2 times a day depending upon her activity and what she is doing.  She has not needed the nebulizer.  She reports that she does have a cough with clear sputum with no hemoptysis    ALLERGIES:  No Known Allergies    PAST MEDICAL HISTORY:       Diagnosis Date    Bladder spasm     COPD (chronic obstructive pulmonary disease) (HCC)     Diarrhea

## 2024-05-17 ENCOUNTER — OFFICE VISIT (OUTPATIENT)
Dept: FAMILY MEDICINE CLINIC | Age: 64
End: 2024-05-17
Payer: COMMERCIAL

## 2024-05-17 VITALS
TEMPERATURE: 98.2 F | WEIGHT: 102 LBS | BODY MASS INDEX: 18.07 KG/M2 | SYSTOLIC BLOOD PRESSURE: 136 MMHG | DIASTOLIC BLOOD PRESSURE: 82 MMHG | HEIGHT: 63 IN | HEART RATE: 101 BPM | OXYGEN SATURATION: 93 %

## 2024-05-17 DIAGNOSIS — I10 ESSENTIAL HYPERTENSION: Primary | ICD-10-CM

## 2024-05-17 DIAGNOSIS — Z12.31 ENCOUNTER FOR MAMMOGRAM TO ESTABLISH BASELINE MAMMOGRAM: ICD-10-CM

## 2024-05-17 DIAGNOSIS — C34.90 MALIGNANT NEOPLASM OF LUNG, UNSPECIFIED LATERALITY, UNSPECIFIED PART OF LUNG (HCC): ICD-10-CM

## 2024-05-17 PROCEDURE — 3075F SYST BP GE 130 - 139MM HG: CPT | Performed by: FAMILY MEDICINE

## 2024-05-17 PROCEDURE — G8419 CALC BMI OUT NRM PARAM NOF/U: HCPCS | Performed by: FAMILY MEDICINE

## 2024-05-17 PROCEDURE — 99213 OFFICE O/P EST LOW 20 MIN: CPT | Performed by: FAMILY MEDICINE

## 2024-05-17 PROCEDURE — G8427 DOCREV CUR MEDS BY ELIG CLIN: HCPCS | Performed by: FAMILY MEDICINE

## 2024-05-17 PROCEDURE — 3079F DIAST BP 80-89 MM HG: CPT | Performed by: FAMILY MEDICINE

## 2024-05-17 PROCEDURE — 4004F PT TOBACCO SCREEN RCVD TLK: CPT | Performed by: FAMILY MEDICINE

## 2024-05-17 PROCEDURE — 3017F COLORECTAL CA SCREEN DOC REV: CPT | Performed by: FAMILY MEDICINE

## 2024-05-17 NOTE — PROGRESS NOTES
[facility]  Office Progress Note - Dr. Hsu  5/17/24    CC:   Chief Complaint   Patient presents with    Hypertension     Did not take BP meds this AM.         /82   Pulse (!) 101   Temp 98.2 °F (36.8 °C) (Temporal)   Ht 1.6 m (5' 3\")   Wt 46.3 kg (102 lb)   LMP  (LMP Unknown)   SpO2 93%   BMI 18.07 kg/m²   Wt Readings from Last 3 Encounters:   05/17/24 46.3 kg (102 lb)   05/15/24 46.5 kg (102 lb 9.6 oz)   05/02/24 48.5 kg (107 lb)         Wt Readings from Last 3 Encounters:   02/16/24 47.2 kg (104 lb)   01/25/24 47.6 kg (105 lb)   12/27/23 47.2 kg (104 lb 1.6 oz)     11/14/23 47.2 kg (104 lb)   11/07/23 49.9 kg (110 lb)   09/26/23 50.8 kg (112 lb)     08/19/22 110 lb (49.9 kg)   07/27/22 106 lb (48.1 kg)   06/24/22 106 lb (48.1 kg)       HPI: nausea has been minimal appetite has been decent.  She is going to work on gaining 5 to 10 pounds back.  She feels like she is getting enough to eat.  She declines any nausea or appetite stimulant medications.    Hypertension  Follow-up  Blood pressure is controlled today.  BP Readings from Last 3 Encounters:   05/17/24 136/82   05/15/24 137/61   05/02/24 (!) 142/62     Patient continues medications regularly. Compliance is good.  Denies CP, sob, abd pain, headaches, vision changes, dizziness, hypotensive symptoms.   No side effects from medications noted.     She received radiation treatment for strongly suspicious lung mass that was difficult to biopsy and ultimately was believed to be a false negative result.  She is going to have repeat lung imaging in August.  She has been feeling pretty well.  _________________________________________________________    Assessment / Plan  Kenji was seen today for hypertension.    Diagnoses and all orders for this visit:    Essential hypertension  Blood pressure acceptable today.  Continue current medication regimen.  Continue avoidance of alcohol.  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

## 2024-05-31 ENCOUNTER — HOSPITAL ENCOUNTER (OUTPATIENT)
Dept: RADIATION ONCOLOGY | Age: 64
End: 2024-05-31
Payer: COMMERCIAL

## 2024-06-12 DIAGNOSIS — J44.9 CHRONIC OBSTRUCTIVE PULMONARY DISEASE, UNSPECIFIED COPD TYPE (HCC): ICD-10-CM

## 2024-06-13 NOTE — TELEPHONE ENCOUNTER
Last Appointment:  5/17/2024    Future appts:  Future Appointments   Date Time Provider Department Center   8/8/2024 10:15 AM SEYZ MED ONC FAST TRACK 1 SE[facility] Med Onc [facility]   8/8/2024 10:30 AM Hipolito Melendez MD MED ONC [facility]   9/23/2024  9:30 AM JAMESYX ANDRIYNA IMAGING RADHA COL JACBCC [facility]   11/18/2024  9:20 AM Mike Hsu MD COLUMB GIRMA [facility]   11/18/2024 10:20 AM Vee Estes, DO ACC Pulm [facility]

## 2024-06-17 RX ORDER — TIOTROPIUM BROMIDE 18 UG/1
CAPSULE ORAL; RESPIRATORY (INHALATION)
Qty: 30 CAPSULE | Refills: 10 | Status: SHIPPED | OUTPATIENT
Start: 2024-06-17

## 2024-06-17 RX ORDER — PAROXETINE 10 MG/1
10 TABLET, FILM COATED ORAL DAILY
Qty: 30 TABLET | Refills: 10 | Status: SHIPPED | OUTPATIENT
Start: 2024-06-17

## 2024-07-01 ENCOUNTER — OFFICE VISIT (OUTPATIENT)
Dept: FAMILY MEDICINE CLINIC | Age: 64
End: 2024-07-01
Payer: COMMERCIAL

## 2024-07-01 VITALS
HEIGHT: 63 IN | WEIGHT: 104 LBS | OXYGEN SATURATION: 98 % | SYSTOLIC BLOOD PRESSURE: 118 MMHG | BODY MASS INDEX: 18.43 KG/M2 | TEMPERATURE: 98.6 F | HEART RATE: 91 BPM | DIASTOLIC BLOOD PRESSURE: 80 MMHG

## 2024-07-01 DIAGNOSIS — R39.15 URINARY URGENCY: Primary | ICD-10-CM

## 2024-07-01 DIAGNOSIS — N30.80 ACUTE BACTERIAL SIMPLE CYSTITIS: ICD-10-CM

## 2024-07-01 DIAGNOSIS — B96.89 ACUTE BACTERIAL SIMPLE CYSTITIS: ICD-10-CM

## 2024-07-01 LAB
APPEARANCE FLUID: CLEAR
BILIRUBIN, POC: NORMAL
BLOOD URINE, POC: NORMAL
CLARITY, POC: CLEAR
COLOR, POC: NORMAL
GLUCOSE URINE, POC: NORMAL
KETONES, POC: NORMAL
LEUKOCYTE EST, POC: NORMAL
NITRITE, POC: NORMAL
PH, POC: 6.5
PROTEIN, POC: NORMAL
SPECIFIC GRAVITY, POC: 1.01
UROBILINOGEN, POC: 0.2

## 2024-07-01 PROCEDURE — G8419 CALC BMI OUT NRM PARAM NOF/U: HCPCS | Performed by: FAMILY MEDICINE

## 2024-07-01 PROCEDURE — 4004F PT TOBACCO SCREEN RCVD TLK: CPT | Performed by: FAMILY MEDICINE

## 2024-07-01 PROCEDURE — 3079F DIAST BP 80-89 MM HG: CPT | Performed by: FAMILY MEDICINE

## 2024-07-01 PROCEDURE — 81002 URINALYSIS NONAUTO W/O SCOPE: CPT | Performed by: FAMILY MEDICINE

## 2024-07-01 PROCEDURE — 3074F SYST BP LT 130 MM HG: CPT | Performed by: FAMILY MEDICINE

## 2024-07-01 PROCEDURE — 3017F COLORECTAL CA SCREEN DOC REV: CPT | Performed by: FAMILY MEDICINE

## 2024-07-01 PROCEDURE — 99213 OFFICE O/P EST LOW 20 MIN: CPT | Performed by: FAMILY MEDICINE

## 2024-07-01 PROCEDURE — G8427 DOCREV CUR MEDS BY ELIG CLIN: HCPCS | Performed by: FAMILY MEDICINE

## 2024-07-01 RX ORDER — SULFAMETHOXAZOLE AND TRIMETHOPRIM 800; 160 MG/1; MG/1
1 TABLET ORAL 2 TIMES DAILY
Qty: 10 TABLET | Refills: 0 | Status: SHIPPED | OUTPATIENT
Start: 2024-07-01 | End: 2024-07-06

## 2024-07-01 NOTE — PROGRESS NOTES
[facility]  Office Progress Note - Dr. Hsu  7/1/24    CC:   Chief Complaint   Patient presents with    Urinary Urgency     Urgency and frequency.  Denies dysuria.  Onset 2 weeks.    Doctor d/c'andres oxybutynin on 05/17 - pt reports she is still taking it because she is finishing up the pill packs she has with it already in it.        /80   Pulse 91   Temp 98.6 °F (37 °C) (Temporal)   Ht 1.6 m (5' 3\")   Wt 47.2 kg (104 lb)   LMP  (LMP Unknown)   SpO2 98%   BMI 18.42 kg/m²   Wt Readings from Last 3 Encounters:   07/01/24 47.2 kg (104 lb)   05/17/24 46.3 kg (102 lb)   05/15/24 46.5 kg (102 lb 9.6 oz)       HPI: urinary frequency day and night.   Some BL on and off pelvic pain. 2 weeks.   Sometimes some hesitancy.    A warm sensation when urinates. No renae burning.   No apparent DC.   Not sexually active.   No lesions, no soreness.    UA unremarkable today.   No spotting or bleeding.       _________________________________________________________    Assessment / Plan  Kenji was seen today for urinary urgency.    Diagnoses and all orders for this visit:    Urinary urgency  -     POCT Urinalysis no Micro    Acute bacterial simple cystitis    -     sulfamethoxazole-trimethoprim (BACTRIM DS;SEPTRA DS) 800-160 MG per tablet; Take 1 tablet by mouth 2 times daily for 5 days    Going to elect to treat clinically for simple cystitis given the classic symptoms.  Given that she is not sexually active for years I am not worried about an STI.  She has not had any lesions.  We will try Bactrim for 5 days.  If she has not improved in the next week ago we will pursue more testing    Follow-up if symptoms not resolved over the next week.  Or as scheduled.   Patient counseled to follow up sooner or seek more acute care if symptoms worsening or not improving according to plan.     Electronically signed by RHONDA HSU MD on

## 2024-07-08 RX ORDER — PANTOPRAZOLE SODIUM 40 MG/1
TABLET, DELAYED RELEASE ORAL
Qty: 60 TABLET | Refills: 10 | OUTPATIENT
Start: 2024-07-08

## 2024-07-12 ENCOUNTER — TELEPHONE (OUTPATIENT)
Dept: FAMILY MEDICINE CLINIC | Age: 64
End: 2024-07-12

## 2024-07-12 DIAGNOSIS — Z01.818 PREPROCEDURAL EXAMINATION: Primary | ICD-10-CM

## 2024-07-12 NOTE — TELEPHONE ENCOUNTER
Patient called. She has a CT scan scheduled for 7/16. She needs to have blood work done before it. Can you place the order?

## 2024-07-16 ENCOUNTER — HOSPITAL ENCOUNTER (OUTPATIENT)
Age: 64
Discharge: HOME | End: 2024-07-16
Attending: STUDENT IN AN ORGANIZED HEALTH CARE EDUCATION/TRAINING PROGRAM
Payer: COMMERCIAL

## 2024-07-16 ENCOUNTER — HOSPITAL ENCOUNTER (OUTPATIENT)
Dept: CT IMAGING | Age: 64
Discharge: HOME | End: 2024-07-18
Attending: STUDENT IN AN ORGANIZED HEALTH CARE EDUCATION/TRAINING PROGRAM
Payer: COMMERCIAL

## 2024-07-16 DIAGNOSIS — R91.8 LUNG MASS: ICD-10-CM

## 2024-07-16 DIAGNOSIS — Z01.818 PREPROCEDURAL EXAMINATION: ICD-10-CM

## 2024-07-16 LAB
ANION GAP SERPL CALCULATED.3IONS-SCNC: 14 MMOL/L (ref 7–16)
BUN SERPL-MCNC: 10 MG/DL (ref 6–23)
CALCIUM SERPL-MCNC: 9.3 MG/DL (ref 8.6–10.2)
CHLORIDE SERPL-SCNC: 91 MMOL/L (ref 98–107)
CO2 SERPL-SCNC: 23 MMOL/L (ref 22–29)
CREAT SERPL-MCNC: 0.5 MG/DL (ref 0.5–1)
GFR, ESTIMATED: >90 ML/MIN/1.73M2
GLUCOSE SERPL-MCNC: 102 MG/DL (ref 74–99)
POTASSIUM SERPL-SCNC: 3.8 MMOL/L (ref 3.5–5)
SODIUM SERPL-SCNC: 128 MMOL/L (ref 132–146)

## 2024-07-16 PROCEDURE — 80048 BASIC METABOLIC PNL TOTAL CA: CPT

## 2024-07-16 PROCEDURE — 71260 CT THORAX DX C+: CPT

## 2024-07-16 PROCEDURE — 6360000004 HC RX CONTRAST MEDICATION: Performed by: RADIOLOGY

## 2024-07-16 PROCEDURE — 36415 COLL VENOUS BLD VENIPUNCTURE: CPT

## 2024-07-16 RX ADMIN — IOPAMIDOL 75 ML: 755 INJECTION, SOLUTION INTRAVENOUS at 16:57

## 2024-07-31 ENCOUNTER — TELEPHONE (OUTPATIENT)
Dept: PULMONOLOGY | Age: 64
End: 2024-07-31

## 2024-07-31 NOTE — TELEPHONE ENCOUNTER
A message was left with the patient's voicemail requesting that she call the office regarding a CT Chest Jose.  The office number was left with her in the email.

## 2024-08-05 ENCOUNTER — TELEPHONE (OUTPATIENT)
Dept: PULMONOLOGY | Age: 64
End: 2024-08-05

## 2024-08-05 DIAGNOSIS — R91.8 LUNG MASS: ICD-10-CM

## 2024-08-05 DIAGNOSIS — J44.1 ACUTE EXACERBATION OF CHRONIC OBSTRUCTIVE PULMONARY DISEASE (COPD) (HCC): Primary | ICD-10-CM

## 2024-08-05 NOTE — TELEPHONE ENCOUNTER
Patient was called and notified of her appointment date and time.  Patient scheduled at [place] on Sept. 1th at 9am.

## 2024-08-08 ENCOUNTER — OFFICE VISIT (OUTPATIENT)
Dept: ONCOLOGY | Age: 64
End: 2024-08-08
Payer: COMMERCIAL

## 2024-08-08 ENCOUNTER — HOSPITAL ENCOUNTER (OUTPATIENT)
Dept: INFUSION THERAPY | Age: 64
Discharge: HOME | End: 2024-08-08
Payer: COMMERCIAL

## 2024-08-08 VITALS
OXYGEN SATURATION: 96 % | DIASTOLIC BLOOD PRESSURE: 63 MMHG | BODY MASS INDEX: 18.79 KG/M2 | HEART RATE: 75 BPM | SYSTOLIC BLOOD PRESSURE: 139 MMHG | WEIGHT: 106.1 LBS | TEMPERATURE: 98.1 F

## 2024-08-08 DIAGNOSIS — R91.8 LUNG MASS: Primary | ICD-10-CM

## 2024-08-08 DIAGNOSIS — C34.90 MALIGNANT NEOPLASM OF LUNG, UNSPECIFIED LATERALITY, UNSPECIFIED PART OF LUNG (HCC): ICD-10-CM

## 2024-08-08 LAB
ALBUMIN SERPL-MCNC: 4.4 G/DL (ref 3.5–5.2)
ALP SERPL-CCNC: 107 U/L (ref 35–104)
ALT SERPL-CCNC: 26 U/L (ref 0–32)
ANION GAP SERPL CALCULATED.3IONS-SCNC: 9 MMOL/L (ref 7–16)
AST SERPL-CCNC: 27 U/L (ref 0–31)
BASOPHILS # BLD: 0.05 K/UL (ref 0–0.2)
BASOPHILS NFR BLD: 1 % (ref 0–2)
BILIRUB SERPL-MCNC: 0.2 MG/DL (ref 0–1.2)
BUN SERPL-MCNC: 8 MG/DL (ref 6–23)
CALCIUM SERPL-MCNC: 9.9 MG/DL (ref 8.6–10.2)
CHLORIDE SERPL-SCNC: 98 MMOL/L (ref 98–107)
CO2 SERPL-SCNC: 26 MMOL/L (ref 22–29)
CREAT SERPL-MCNC: 0.6 MG/DL (ref 0.5–1)
EOSINOPHIL # BLD: 0.17 K/UL (ref 0.05–0.5)
EOSINOPHILS RELATIVE PERCENT: 3 % (ref 0–6)
ERYTHROCYTE [DISTWIDTH] IN BLOOD BY AUTOMATED COUNT: 13.8 % (ref 11.5–15)
GFR, ESTIMATED: >90 ML/MIN/1.73M2
GLUCOSE SERPL-MCNC: 111 MG/DL (ref 74–99)
HCT VFR BLD AUTO: 41.3 % (ref 34–48)
HGB BLD-MCNC: 13.8 G/DL (ref 11.5–15.5)
IMM GRANULOCYTES # BLD AUTO: <0.03 K/UL (ref 0–0.58)
IMM GRANULOCYTES NFR BLD: 0 % (ref 0–5)
LYMPHOCYTES NFR BLD: 1.91 K/UL (ref 1.5–4)
LYMPHOCYTES RELATIVE PERCENT: 30 % (ref 20–42)
MCH RBC QN AUTO: 31.7 PG (ref 26–35)
MCHC RBC AUTO-ENTMCNC: 33.4 G/DL (ref 32–34.5)
MCV RBC AUTO: 94.7 FL (ref 80–99.9)
MONOCYTES NFR BLD: 0.72 K/UL (ref 0.1–0.95)
MONOCYTES NFR BLD: 11 % (ref 2–12)
NEUTROPHILS NFR BLD: 55 % (ref 43–80)
NEUTS SEG NFR BLD: 3.53 K/UL (ref 1.8–7.3)
PLATELET # BLD AUTO: 304 K/UL (ref 130–450)
PMV BLD AUTO: 9.1 FL (ref 7–12)
POTASSIUM SERPL-SCNC: 4.4 MMOL/L (ref 3.5–5)
PROT SERPL-MCNC: 7.8 G/DL (ref 6.4–8.3)
RBC # BLD AUTO: 4.36 M/UL (ref 3.5–5.5)
SODIUM SERPL-SCNC: 133 MMOL/L (ref 132–146)
WBC OTHER # BLD: 6.4 K/UL (ref 4.5–11.5)

## 2024-08-08 PROCEDURE — 3075F SYST BP GE 130 - 139MM HG: CPT | Performed by: STUDENT IN AN ORGANIZED HEALTH CARE EDUCATION/TRAINING PROGRAM

## 2024-08-08 PROCEDURE — 85025 COMPLETE CBC W/AUTO DIFF WBC: CPT

## 2024-08-08 PROCEDURE — 4004F PT TOBACCO SCREEN RCVD TLK: CPT | Performed by: STUDENT IN AN ORGANIZED HEALTH CARE EDUCATION/TRAINING PROGRAM

## 2024-08-08 PROCEDURE — 36415 COLL VENOUS BLD VENIPUNCTURE: CPT

## 2024-08-08 PROCEDURE — G8420 CALC BMI NORM PARAMETERS: HCPCS | Performed by: STUDENT IN AN ORGANIZED HEALTH CARE EDUCATION/TRAINING PROGRAM

## 2024-08-08 PROCEDURE — G8427 DOCREV CUR MEDS BY ELIG CLIN: HCPCS | Performed by: STUDENT IN AN ORGANIZED HEALTH CARE EDUCATION/TRAINING PROGRAM

## 2024-08-08 PROCEDURE — 80053 COMPREHEN METABOLIC PANEL: CPT

## 2024-08-08 PROCEDURE — 3017F COLORECTAL CA SCREEN DOC REV: CPT | Performed by: STUDENT IN AN ORGANIZED HEALTH CARE EDUCATION/TRAINING PROGRAM

## 2024-08-08 PROCEDURE — 99213 OFFICE O/P EST LOW 20 MIN: CPT | Performed by: STUDENT IN AN ORGANIZED HEALTH CARE EDUCATION/TRAINING PROGRAM

## 2024-08-08 PROCEDURE — 3078F DIAST BP <80 MM HG: CPT | Performed by: STUDENT IN AN ORGANIZED HEALTH CARE EDUCATION/TRAINING PROGRAM

## 2024-08-08 NOTE — PROGRESS NOTES
[facility] PHYSICIANS [facility] CARE [facility] MED ONCOLOGY  [address]  [facility] [address]  Dept: [phone]  Loc: [phone]    Clinic Progress Note      Date of Encounter: 08/08/2024     Referring Provider:  Adrian Yoon III, MD    Reason for Visit:   Right lung nodules/masses x2        PCP:  Mike Hsu MD    Demographics: 64 y.o. female    Chief Complaint   Patient presents with    Follow-up     Lung Mass         Subjective:  Pt returns after her CT scans. No major events.   She has been having more cough lately.     HPI from Initial Outpatient Consultation  (01/25/2024):  The patient is a 63 y.o. female patient comes in for evaluation for right-sided lung nodules, 1 of which is located on the right upper lobe and the other being on the right lower lobe.  Per chart review, these nodules appear to have been detected on CT lung cancer screening about 1.5 years ago.  PET scan had been done on 7/6/2022, without significant FDG avid findings.    It was not until more recently the last few months, when CT chest suggested increase in size of nodules.  And PET scan now shows increased FDG avidity.  The patient has been evaluated by CT surgery, reported poor candidate for pneumonectomy.  She was sent to see radiation oncology, and then patient is meeting me today.    Otherwise patient denies of new or worrisome symptoms.  Denies of any pain.  She was accompanied by her sister today.              Past Medical History:   Diagnosis Date    Bladder spasm     COPD (chronic obstructive pulmonary disease) (HCC)     Diarrhea     GERD (gastroesophageal reflux disease)     Hypertension     Mass of lower lobe of right lung     Mass of upper lobe of right lung        Past Surgical History:   Procedure Laterality Date    APPENDECTOMY  1995    BRONCHOSCOPY N/A 2/22/2024    BRONCHOSCOPY ENDOBRONCHIAL ULTRASOUND performed by Tristan Caruso MD [facility]    BRONCHOSCOPY N/A 2/22/2024

## 2024-08-09 NOTE — PROGRESS NOTES
Patient provided with discharge instructions, patient has MYCHART.  All questions answered.  Patient understands follow up plan of care.

## 2024-09-01 ENCOUNTER — HOSPITAL ENCOUNTER (OUTPATIENT)
Dept: CT IMAGING | Age: 64
Discharge: HOME | End: 2024-09-03
Attending: INTERNAL MEDICINE
Payer: COMMERCIAL

## 2024-09-01 DIAGNOSIS — J44.1 ACUTE EXACERBATION OF CHRONIC OBSTRUCTIVE PULMONARY DISEASE (COPD) (HCC): ICD-10-CM

## 2024-09-01 DIAGNOSIS — R91.8 LUNG MASS: ICD-10-CM

## 2024-09-01 PROCEDURE — 71250 CT THORAX DX C-: CPT

## 2024-09-05 NOTE — PROGRESS NOTES
Patient does not need a clearance prior to her bronchoscopy, she needs an EKG, I left a message with the patient and her sister.

## 2024-09-11 ENCOUNTER — NURSE ONLY (OUTPATIENT)
Dept: FAMILY MEDICINE CLINIC | Age: 64
End: 2024-09-11
Payer: COMMERCIAL

## 2024-09-11 DIAGNOSIS — Z01.818 PREPROCEDURAL EXAMINATION: Primary | ICD-10-CM

## 2024-09-11 PROCEDURE — 93000 ELECTROCARDIOGRAM COMPLETE: CPT | Performed by: FAMILY MEDICINE

## 2024-09-12 RX ORDER — LISINOPRIL AND HYDROCHLOROTHIAZIDE 12.5; 2 MG/1; MG/1
1 TABLET ORAL DAILY
Qty: 30 TABLET | Refills: 10 | Status: SHIPPED | OUTPATIENT
Start: 2024-09-12

## 2024-09-16 RX ORDER — OXYBUTYNIN CHLORIDE 10 MG/1
10 TABLET, EXTENDED RELEASE ORAL DAILY
COMMUNITY
Start: 2024-09-11

## 2024-09-16 NOTE — PROGRESS NOTES
jeanmarie      [facility] PRE-ADMISSION TESTING   ENDOSCOPY INSTRUCTIONS  PAT- Phone Number: [phone]    ENDOSCOPY INSTRUCTIONS:     [x] Antibacterial Soap Shower Night before AND morning of procedure.  [x] Do not wear makeup, lotions, powders, deodorant.   [x] No solid food after midnight. You may have SIPS of clear liquids up until 2 hours before your arrival time to the hospital.   [x] You may brush your teeth, gargle, but do NOT swallow water.   [x] No tobacco products, illegal drugs, or alcohol within 24 hours of your surgery.  [x] Jewelry or valuables should not be brought to the hospital. All body and/or tongue piercing's must be removed prior to arriving to hospital. No contact lens or hair pins.   [] Urine Pregnancy test will be preformed the day of surgery. A specimen sample may be brought from home.  [x] Arrange transportation with a responsible adult  to and from the hospital. If you do not have a responsible adult  to transport you, you will need to make arrangements with a medical transportation company. Arrange for someone to be with you for the remainder of the day and for 24 hours after your procedure due to having had anesthesia.    -Who will be your  for transportation? Sister   -Who will be staying with you for 24 hrs after your procedure?  Sister   [x] Bring insurance card and photo ID.  [x] Bring copy of living will or healthcare power of  papers to be placed in your electronic record.    PARKING INSTRUCTIONS:     [x] ARRIVAL DATE & TIME:  9/18  @ 1215   [x] Times are subject to change. We will contact you the business day before surgery if that were to occur.  [x] Enter into the [facility] Entrance. Two people may accompany you. Masks are not required.  [x] Parking Lot \"I\" is where you will park. It is located on the corner of [address] and [address]. The entrance is on [address].   Only one vehicle - per patient, is  permitted in parking lot.   Upon entering the parking lot, a voucher ticket will print.    MEDICATION INSTRUCTIONS:    [x] Bring a complete list of your medications, please write the last time you took the medicine, give this list to the nurse in Pre-Op.  [x] Take ONLY the following medications the morning of surgery: wellbutrin, paxil   [x] Stop all herbal supplements and vitamins 5 days before surgery.   [x] Stop all NSAIDS 7 days before surgery.  [] DO NOT take any diabetic medicine the morning of surgery.  Follow instructions for insulin the day before surgery.  [] If you are diabetic and your blood sugar is low or you feel symptomatic, you may drink 1-2 ounces of apple juice or take a glucose tablet.            -The morning of your procedure, you may call the pre-op area if you have concerns about your blood sugar [phone].  [x] Use your inhalers the morning of surgery.  Bring your emergency inhaler with you day of surgery.  [x] Follow physician instructions regarding any blood thinners you may be taking.    WHAT TO EXPECT:    [x] The day of your procedure you will be greeted and checked in by the [facility] .  In addition, you will be registered in the [facility] by a Patient Access Representative. Please bring your photo ID and insurance card. A nurse will greet you in accordance to the time you are needed in the pre-op area to prepare you for surgery. Please do not be discouraged if you are not greeted in the order you arrive as there are many variables that are involved in patient preparation. Your patience is greatly appreciated as you wait for your nurse.  [x] Delays may occur. Staff will make a sincere effort to keep you informed of delays. If any delays occur with your procedure, we apologize ahead of time for your inconvenience as we recognize the value of your time.

## 2024-09-18 ENCOUNTER — ANESTHESIA (OUTPATIENT)
Dept: ENDOSCOPY | Age: 64
End: 2024-09-18
Payer: COMMERCIAL

## 2024-09-18 ENCOUNTER — APPOINTMENT (OUTPATIENT)
Dept: GENERAL RADIOLOGY | Age: 64
End: 2024-09-18
Attending: INTERNAL MEDICINE
Payer: COMMERCIAL

## 2024-09-18 ENCOUNTER — ANESTHESIA EVENT (OUTPATIENT)
Dept: ENDOSCOPY | Age: 64
End: 2024-09-18
Payer: COMMERCIAL

## 2024-09-18 ENCOUNTER — HOSPITAL ENCOUNTER (OUTPATIENT)
Age: 64
Setting detail: OUTPATIENT SURGERY
Discharge: HOME | End: 2024-09-18
Attending: INTERNAL MEDICINE | Admitting: INTERNAL MEDICINE
Payer: COMMERCIAL

## 2024-09-18 VITALS
SYSTOLIC BLOOD PRESSURE: 112 MMHG | BODY MASS INDEX: 17.75 KG/M2 | DIASTOLIC BLOOD PRESSURE: 69 MMHG | RESPIRATION RATE: 15 BRPM | TEMPERATURE: 97.1 F | HEART RATE: 74 BPM | WEIGHT: 104 LBS | HEIGHT: 64 IN | OXYGEN SATURATION: 96 %

## 2024-09-18 DIAGNOSIS — R91.1 LUNG NODULE: ICD-10-CM

## 2024-09-18 LAB
ANION GAP SERPL CALCULATED.3IONS-SCNC: 17 MMOL/L (ref 7–16)
BASOPHILS # BLD: 0.05 K/UL (ref 0–0.2)
BASOPHILS NFR BLD: 0 % (ref 0–2)
BUN SERPL-MCNC: 8 MG/DL (ref 6–23)
CALCIUM SERPL-MCNC: 9.9 MG/DL (ref 8.6–10.2)
CHLORIDE SERPL-SCNC: 88 MMOL/L (ref 98–107)
CO2 SERPL-SCNC: 22 MMOL/L (ref 22–29)
CREAT SERPL-MCNC: 0.5 MG/DL (ref 0.5–1)
EOSINOPHIL # BLD: 0.13 K/UL (ref 0.05–0.5)
EOSINOPHILS RELATIVE PERCENT: 1 % (ref 0–6)
ERYTHROCYTE [DISTWIDTH] IN BLOOD BY AUTOMATED COUNT: 13.3 % (ref 11.5–15)
GFR, ESTIMATED: >90 ML/MIN/1.73M2
GLUCOSE SERPL-MCNC: 92 MG/DL (ref 74–99)
HCT VFR BLD AUTO: 45.8 % (ref 34–48)
HGB BLD-MCNC: 15.7 G/DL (ref 11.5–15.5)
IMM GRANULOCYTES # BLD AUTO: 0.06 K/UL (ref 0–0.58)
IMM GRANULOCYTES NFR BLD: 1 % (ref 0–5)
INR PPP: 1.1
LYMPHOCYTES NFR BLD: 1.92 K/UL (ref 1.5–4)
LYMPHOCYTES RELATIVE PERCENT: 17 % (ref 20–42)
MCH RBC QN AUTO: 32.4 PG (ref 26–35)
MCHC RBC AUTO-ENTMCNC: 34.3 G/DL (ref 32–34.5)
MCV RBC AUTO: 94.6 FL (ref 80–99.9)
MONOCYTES NFR BLD: 0.96 K/UL (ref 0.1–0.95)
MONOCYTES NFR BLD: 9 % (ref 2–12)
NEUTROPHILS NFR BLD: 72 % (ref 43–80)
NEUTS SEG NFR BLD: 8.17 K/UL (ref 1.8–7.3)
PLATELET, FLUORESCENCE: 386 K/UL (ref 130–450)
PMV BLD AUTO: 9 FL (ref 7–12)
POTASSIUM SERPL-SCNC: 3.3 MMOL/L (ref 3.5–5)
PROTHROMBIN TIME: 11.9 SEC (ref 9.3–12.4)
RBC # BLD AUTO: 4.84 M/UL (ref 3.5–5.5)
SODIUM SERPL-SCNC: 127 MMOL/L (ref 132–146)
WBC OTHER # BLD: 11.3 K/UL (ref 4.5–11.5)

## 2024-09-18 PROCEDURE — 31623 DX BRONCHOSCOPE/BRUSH: CPT | Performed by: INTERNAL MEDICINE

## 2024-09-18 PROCEDURE — 7100000010 HC PHASE II RECOVERY - FIRST 15 MIN: Performed by: INTERNAL MEDICINE

## 2024-09-18 PROCEDURE — 2580000003 HC RX 258: Performed by: INTERNAL MEDICINE

## 2024-09-18 PROCEDURE — 7100000000 HC PACU RECOVERY - FIRST 15 MIN: Performed by: INTERNAL MEDICINE

## 2024-09-18 PROCEDURE — 2709999900 HC NON-CHARGEABLE SUPPLY: Performed by: INTERNAL MEDICINE

## 2024-09-18 PROCEDURE — 2500000003 HC RX 250 WO HCPCS: Performed by: NURSE ANESTHETIST, CERTIFIED REGISTERED

## 2024-09-18 PROCEDURE — 6360000002 HC RX W HCPCS: Performed by: NURSE ANESTHETIST, CERTIFIED REGISTERED

## 2024-09-18 PROCEDURE — 31628 BRONCHOSCOPY/LUNG BX EACH: CPT | Performed by: INTERNAL MEDICINE

## 2024-09-18 PROCEDURE — 7100000011 HC PHASE II RECOVERY - ADDTL 15 MIN: Performed by: INTERNAL MEDICINE

## 2024-09-18 PROCEDURE — 88173 CYTOPATH EVAL FNA REPORT: CPT

## 2024-09-18 PROCEDURE — 85610 PROTHROMBIN TIME: CPT

## 2024-09-18 PROCEDURE — 86403 PARTICLE AGGLUT ANTBDY SCRN: CPT

## 2024-09-18 PROCEDURE — 71045 X-RAY EXAM CHEST 1 VIEW: CPT

## 2024-09-18 PROCEDURE — 85025 COMPLETE CBC W/AUTO DIFF WBC: CPT

## 2024-09-18 PROCEDURE — C1601 HC ENDOSCOPE, PULM, IMAGING/ILLUMINATION DEVICE: HCPCS | Performed by: INTERNAL MEDICINE

## 2024-09-18 PROCEDURE — 31627 NAVIGATIONAL BRONCHOSCOPY: CPT | Performed by: INTERNAL MEDICINE

## 2024-09-18 PROCEDURE — 3700000000 HC ANESTHESIA ATTENDED CARE: Performed by: INTERNAL MEDICINE

## 2024-09-18 PROCEDURE — 88112 CYTOPATH CELL ENHANCE TECH: CPT

## 2024-09-18 PROCEDURE — 6370000000 HC RX 637 (ALT 250 FOR IP): Performed by: INTERNAL MEDICINE

## 2024-09-18 PROCEDURE — 87205 SMEAR GRAM STAIN: CPT

## 2024-09-18 PROCEDURE — 31629 BRONCHOSCOPY/NEEDLE BX EACH: CPT | Performed by: INTERNAL MEDICINE

## 2024-09-18 PROCEDURE — 7100000001 HC PACU RECOVERY - ADDTL 15 MIN: Performed by: INTERNAL MEDICINE

## 2024-09-18 PROCEDURE — 88305 TISSUE EXAM BY PATHOLOGIST: CPT

## 2024-09-18 PROCEDURE — 87070 CULTURE OTHR SPECIMN AEROBIC: CPT

## 2024-09-18 PROCEDURE — 3609020000 HC BRONCHOSCOPY W/EBUS FNA: Performed by: INTERNAL MEDICINE

## 2024-09-18 PROCEDURE — 80048 BASIC METABOLIC PNL TOTAL CA: CPT

## 2024-09-18 PROCEDURE — 3700000001 HC ADD 15 MINUTES (ANESTHESIA): Performed by: INTERNAL MEDICINE

## 2024-09-18 PROCEDURE — 2720000010 HC SURG SUPPLY STERILE: Performed by: INTERNAL MEDICINE

## 2024-09-18 PROCEDURE — 31624 DX BRONCHOSCOPE/LAVAGE: CPT | Performed by: INTERNAL MEDICINE

## 2024-09-18 RX ORDER — LIDOCAINE HYDROCHLORIDE 20 MG/ML
INJECTION, SOLUTION INTRAVENOUS
Status: DISCONTINUED | OUTPATIENT
Start: 2024-09-18 | End: 2024-09-18 | Stop reason: SDUPTHER

## 2024-09-18 RX ORDER — ONDANSETRON 2 MG/ML
INJECTION INTRAMUSCULAR; INTRAVENOUS
Status: DISCONTINUED | OUTPATIENT
Start: 2024-09-18 | End: 2024-09-18 | Stop reason: SDUPTHER

## 2024-09-18 RX ORDER — ROCURONIUM BROMIDE 10 MG/ML
INJECTION, SOLUTION INTRAVENOUS
Status: DISCONTINUED | OUTPATIENT
Start: 2024-09-18 | End: 2024-09-18 | Stop reason: SDUPTHER

## 2024-09-18 RX ORDER — PROPOFOL 10 MG/ML
INJECTION, EMULSION INTRAVENOUS
Status: DISCONTINUED | OUTPATIENT
Start: 2024-09-18 | End: 2024-09-18 | Stop reason: SDUPTHER

## 2024-09-18 RX ORDER — MIDAZOLAM HYDROCHLORIDE 1 MG/ML
INJECTION INTRAMUSCULAR; INTRAVENOUS
Status: DISCONTINUED | OUTPATIENT
Start: 2024-09-18 | End: 2024-09-18 | Stop reason: SDUPTHER

## 2024-09-18 RX ORDER — SODIUM CHLORIDE 0.9 % (FLUSH) 0.9 %
5-40 SYRINGE (ML) INJECTION EVERY 12 HOURS SCHEDULED
Status: DISCONTINUED | OUTPATIENT
Start: 2024-09-18 | End: 2024-09-18 | Stop reason: HOSPADM

## 2024-09-18 RX ORDER — DEXAMETHASONE SODIUM PHOSPHATE 10 MG/ML
INJECTION INTRAMUSCULAR; INTRAVENOUS
Status: DISCONTINUED | OUTPATIENT
Start: 2024-09-18 | End: 2024-09-18 | Stop reason: SDUPTHER

## 2024-09-18 RX ORDER — FENTANYL CITRATE 50 UG/ML
INJECTION, SOLUTION INTRAMUSCULAR; INTRAVENOUS
Status: DISCONTINUED | OUTPATIENT
Start: 2024-09-18 | End: 2024-09-18 | Stop reason: SDUPTHER

## 2024-09-18 RX ORDER — PHENYLEPHRINE HCL IN 0.9% NACL 1 MG/10 ML
SYRINGE (ML) INTRAVENOUS
Status: DISCONTINUED | OUTPATIENT
Start: 2024-09-18 | End: 2024-09-18 | Stop reason: SDUPTHER

## 2024-09-18 RX ORDER — SODIUM CHLORIDE 9 MG/ML
INJECTION, SOLUTION INTRAVENOUS PRN
Status: DISCONTINUED | OUTPATIENT
Start: 2024-09-18 | End: 2024-09-18 | Stop reason: HOSPADM

## 2024-09-18 RX ORDER — SODIUM CHLORIDE 0.9 % (FLUSH) 0.9 %
5-40 SYRINGE (ML) INJECTION PRN
Status: DISCONTINUED | OUTPATIENT
Start: 2024-09-18 | End: 2024-09-18 | Stop reason: HOSPADM

## 2024-09-18 RX ADMIN — MIDAZOLAM 2 MG: 1 INJECTION INTRAMUSCULAR; INTRAVENOUS at 13:29

## 2024-09-18 RX ADMIN — Medication 100 MCG: at 14:17

## 2024-09-18 RX ADMIN — FENTANYL CITRATE 50 MCG: 50 INJECTION, SOLUTION INTRAMUSCULAR; INTRAVENOUS at 13:30

## 2024-09-18 RX ADMIN — FENTANYL CITRATE 50 MCG: 50 INJECTION, SOLUTION INTRAMUSCULAR; INTRAVENOUS at 14:33

## 2024-09-18 RX ADMIN — Medication 200 MCG: at 14:24

## 2024-09-18 RX ADMIN — SUGAMMADEX 200 MG: 100 INJECTION, SOLUTION INTRAVENOUS at 14:49

## 2024-09-18 RX ADMIN — PROPOFOL 150 MG: 10 INJECTION, EMULSION INTRAVENOUS at 13:30

## 2024-09-18 RX ADMIN — LIDOCAINE HYDROCHLORIDE 40 MG: 20 INJECTION, SOLUTION INTRAVENOUS at 13:30

## 2024-09-18 RX ADMIN — POTASSIUM BICARBONATE 40 MEQ: 782 TABLET, EFFERVESCENT ORAL at 15:44

## 2024-09-18 RX ADMIN — ONDANSETRON HYDROCHLORIDE 4 MG: 2 SOLUTION INTRAMUSCULAR; INTRAVENOUS at 14:41

## 2024-09-18 RX ADMIN — PROPOFOL 75 MCG/KG/MIN: 10 INJECTION, EMULSION INTRAVENOUS at 13:35

## 2024-09-18 RX ADMIN — ROCURONIUM BROMIDE 50 MG: 10 INJECTION, SOLUTION INTRAVENOUS at 13:30

## 2024-09-18 RX ADMIN — DEXAMETHASONE SODIUM PHOSPHATE 10 MG: 10 INJECTION INTRAMUSCULAR; INTRAVENOUS at 13:30

## 2024-09-18 RX ADMIN — Medication 100 MCG: at 14:06

## 2024-09-18 RX ADMIN — SODIUM CHLORIDE: 9 INJECTION, SOLUTION INTRAVENOUS at 12:58

## 2024-09-18 ASSESSMENT — COPD QUESTIONNAIRES: CAT_SEVERITY: MODERATE

## 2024-09-18 ASSESSMENT — PAIN - FUNCTIONAL ASSESSMENT
PAIN_FUNCTIONAL_ASSESSMENT: NONE - DENIES PAIN
PAIN_FUNCTIONAL_ASSESSMENT: 0-10

## 2024-09-18 ASSESSMENT — PAIN SCALES - GENERAL: PAINLEVEL_OUTOF10: 0

## 2024-09-18 ASSESSMENT — LIFESTYLE VARIABLES: SMOKING_STATUS: 1

## 2024-09-18 ASSESSMENT — ENCOUNTER SYMPTOMS: DYSPNEA ACTIVITY LEVEL: AFTER AMBULATING 2 FLIGHTS OF STAIRS

## 2024-09-18 NOTE — DISCHARGE INSTRUCTIONS
[facility]   [facility]     PROCEDURE DISCHARGE INSTRUCTIONS    PROCEDURE COMPLETED:    You or your loved one has had a bronchoscopy with our without biospys. A bronchoscopy is the visual examination of the lungs and air passages, called the bronchial tubes. The exam is performed with a bronchoscope, an flexable instrument with a lighted tip and camera.  A bronchoscope is also used to obtain tissue and secretion/fluid samples. This is a procedure helps diagnose lung related diseases and conditions, such as, cancer or an infection.     Usually, you don't have to stay in the hospital, but an overnight stay may be necessary in some cases. You may be drowsy or lightheaded after receiving sedation or anesthesia.  A responsible person should be with you for the next 24 hours.  Please follow the instructions checked below:    DISCHARGE CARE INSTRUCTIONS:    GENERAL CARE:   [x] Immediately after the procedure, spit out any saliva until your throat is no longer numb. [x] If you are a smoker, Quit - its the best preventable health problem.    [x] Expect a hoarse voice, sore neck and throat for a few days after the bronchoscopy.     DIET INSTRUCTIONS:  [x] Start with light diet and progress to your normal diet as you feel like eating. If you experience nausea or repeated episodes of vomiting which persist beyond 6 hours, notify your doctor.  [] Other     ACTIVITY INSTRUCTIONS:  [x] Rest today. Increase activity as tolerated.   [x] If you take inhalers/puffers resume them immediately.   [x] No heavy lifting or strenuous activity.     [x] No driving for for at least 6 hours.  [x] You may shower or bathe      [x] You may resume your normal activity tomorrow  [] Other         MEDICATION INSTRUCTIONS:  If you had to stop medicines before the procedure, ask your doctor when you can start again. Medicines often stopped include:   Anti-inflammatory drugs (eg, aspirin,  motrin, ibuprofen )   Blood thinners like clopidogrel (Plavix), dabigatran (Pradaxa) or warfarin (Coumadin)     If you are taking medications, follow these general guidelines:   [x] Other than blood thinner (mentioned above) RESUME your medications as directed. Please, do not change the amount or the schedule.  [x] If you notice new changes report them to your doctor.     [] You may take a non-prescription \"headache\" or \"pain relief\", preferably one that does not contain aspirin for mild aches and pains.      FOLLOW-UP CARE:  [] Please, Call [phone] & schedule a follow-up appointment.    Typically your test/biospy results take 3 - 5 days to be completed.   [x] Monitor your recovery once you leave the hospital, keep your health care provider informed of any changes.  [x] Call Your Doctor If Any of the Following Occurs:    [x] Bleeding, progressive breathing problems, an irregular heart rate.    [] Signs of infection, including fever and chills    [x] Cough, shortness of breath, or chest pain    [] Coughing up more than a teaspoon (5 milliliters) of blood   [x] Severe nausea or vomiting    [x] Increased or unusual wheezing   [] Pain that you can't control with the medications you've been given     In case of an emergency, call 911 immediately.     I spoke with Dr. Melendez. You do NOT need to get labs drawn tomorrow. Please follow up with him as scheduled next week.         Vee Estes, DO,

## 2024-09-18 NOTE — PROGRESS NOTES
Informed Dr. Del Castillo of patient having 3 cups of black coffee today from 7am-1030am. He stated that was okay.

## 2024-09-18 NOTE — PROGRESS NOTES
Patient up to the restroom tolerated well , discharge instructions gone over with patient and patients sister

## 2024-09-21 LAB
MICROORGANISM SPEC CULT: ABNORMAL
MICROORGANISM SPEC CULT: ABNORMAL
MICROORGANISM/AGENT SPEC: ABNORMAL
SERVICE CMNT-IMP: ABNORMAL
SPECIMEN DESCRIPTION: ABNORMAL

## 2024-09-25 LAB — SURGICAL PATHOLOGY REPORT: NORMAL

## 2024-09-26 ENCOUNTER — HOSPITAL ENCOUNTER (OUTPATIENT)
Dept: INFUSION THERAPY | Age: 64
Discharge: HOME | End: 2024-09-26
Payer: COMMERCIAL

## 2024-09-26 ENCOUNTER — OFFICE VISIT (OUTPATIENT)
Dept: ONCOLOGY | Age: 64
End: 2024-09-26
Payer: COMMERCIAL

## 2024-09-26 VITALS
OXYGEN SATURATION: 98 % | SYSTOLIC BLOOD PRESSURE: 147 MMHG | DIASTOLIC BLOOD PRESSURE: 69 MMHG | BODY MASS INDEX: 18.35 KG/M2 | WEIGHT: 106.9 LBS | HEART RATE: 78 BPM | TEMPERATURE: 97.3 F

## 2024-09-26 DIAGNOSIS — C34.90 MALIGNANT NEOPLASM OF LUNG, UNSPECIFIED LATERALITY, UNSPECIFIED PART OF LUNG (HCC): ICD-10-CM

## 2024-09-26 DIAGNOSIS — R91.8 LUNG MASS: Primary | ICD-10-CM

## 2024-09-26 LAB
ALBUMIN SERPL-MCNC: 4.5 G/DL (ref 3.5–5.2)
ALP SERPL-CCNC: 112 U/L (ref 35–104)
ALT SERPL-CCNC: 20 U/L (ref 0–32)
ANION GAP SERPL CALCULATED.3IONS-SCNC: 13 MMOL/L (ref 7–16)
AST SERPL-CCNC: 19 U/L (ref 0–31)
BASOPHILS # BLD: 0.04 K/UL (ref 0–0.2)
BASOPHILS NFR BLD: 1 % (ref 0–2)
BILIRUB SERPL-MCNC: 0.3 MG/DL (ref 0–1.2)
BUN SERPL-MCNC: 7 MG/DL (ref 6–23)
CALCIUM SERPL-MCNC: 10 MG/DL (ref 8.6–10.2)
CHLORIDE SERPL-SCNC: 97 MMOL/L (ref 98–107)
CO2 SERPL-SCNC: 25 MMOL/L (ref 22–29)
CREAT SERPL-MCNC: 0.5 MG/DL (ref 0.5–1)
EOSINOPHIL # BLD: 0.12 K/UL (ref 0.05–0.5)
EOSINOPHILS RELATIVE PERCENT: 2 % (ref 0–6)
ERYTHROCYTE [DISTWIDTH] IN BLOOD BY AUTOMATED COUNT: 13.7 % (ref 11.5–15)
GFR, ESTIMATED: >90 ML/MIN/1.73M2
GLUCOSE SERPL-MCNC: 98 MG/DL (ref 74–99)
HCT VFR BLD AUTO: 41.3 % (ref 34–48)
HGB BLD-MCNC: 13.9 G/DL (ref 11.5–15.5)
IMM GRANULOCYTES # BLD AUTO: 0.03 K/UL (ref 0–0.58)
IMM GRANULOCYTES NFR BLD: 1 % (ref 0–5)
LYMPHOCYTES NFR BLD: 1.85 K/UL (ref 1.5–4)
LYMPHOCYTES RELATIVE PERCENT: 34 % (ref 20–42)
MCH RBC QN AUTO: 32.3 PG (ref 26–35)
MCHC RBC AUTO-ENTMCNC: 33.7 G/DL (ref 32–34.5)
MCV RBC AUTO: 95.8 FL (ref 80–99.9)
MONOCYTES NFR BLD: 0.62 K/UL (ref 0.1–0.95)
MONOCYTES NFR BLD: 11 % (ref 2–12)
NEUTROPHILS NFR BLD: 51 % (ref 43–80)
NEUTS SEG NFR BLD: 2.78 K/UL (ref 1.8–7.3)
NON-GYN CYTOLOGY REPORT: NORMAL
PLATELET # BLD AUTO: 364 K/UL (ref 130–450)
PMV BLD AUTO: 9.1 FL (ref 7–12)
POTASSIUM SERPL-SCNC: 4.6 MMOL/L (ref 3.5–5)
PROT SERPL-MCNC: 7.6 G/DL (ref 6.4–8.3)
RBC # BLD AUTO: 4.31 M/UL (ref 3.5–5.5)
SODIUM SERPL-SCNC: 135 MMOL/L (ref 132–146)
WBC OTHER # BLD: 5.4 K/UL (ref 4.5–11.5)

## 2024-09-26 PROCEDURE — 36415 COLL VENOUS BLD VENIPUNCTURE: CPT

## 2024-09-26 PROCEDURE — G8427 DOCREV CUR MEDS BY ELIG CLIN: HCPCS | Performed by: STUDENT IN AN ORGANIZED HEALTH CARE EDUCATION/TRAINING PROGRAM

## 2024-09-26 PROCEDURE — 3017F COLORECTAL CA SCREEN DOC REV: CPT | Performed by: STUDENT IN AN ORGANIZED HEALTH CARE EDUCATION/TRAINING PROGRAM

## 2024-09-26 PROCEDURE — G8419 CALC BMI OUT NRM PARAM NOF/U: HCPCS | Performed by: STUDENT IN AN ORGANIZED HEALTH CARE EDUCATION/TRAINING PROGRAM

## 2024-09-26 PROCEDURE — 85025 COMPLETE CBC W/AUTO DIFF WBC: CPT

## 2024-09-26 PROCEDURE — 3078F DIAST BP <80 MM HG: CPT | Performed by: STUDENT IN AN ORGANIZED HEALTH CARE EDUCATION/TRAINING PROGRAM

## 2024-09-26 PROCEDURE — 80053 COMPREHEN METABOLIC PANEL: CPT

## 2024-09-26 PROCEDURE — 99213 OFFICE O/P EST LOW 20 MIN: CPT | Performed by: STUDENT IN AN ORGANIZED HEALTH CARE EDUCATION/TRAINING PROGRAM

## 2024-09-26 PROCEDURE — 4004F PT TOBACCO SCREEN RCVD TLK: CPT | Performed by: STUDENT IN AN ORGANIZED HEALTH CARE EDUCATION/TRAINING PROGRAM

## 2024-09-26 PROCEDURE — 3077F SYST BP >= 140 MM HG: CPT | Performed by: STUDENT IN AN ORGANIZED HEALTH CARE EDUCATION/TRAINING PROGRAM

## 2024-09-29 RX ORDER — PANTOPRAZOLE SODIUM 40 MG/1
TABLET, DELAYED RELEASE ORAL
Qty: 30 TABLET | Refills: 10 | OUTPATIENT
Start: 2024-09-29

## 2024-10-11 ENCOUNTER — HOSPITAL ENCOUNTER (OUTPATIENT)
Dept: PET IMAGING | Age: 64
Discharge: HOME | End: 2024-10-13
Attending: STUDENT IN AN ORGANIZED HEALTH CARE EDUCATION/TRAINING PROGRAM
Payer: COMMERCIAL

## 2024-10-11 DIAGNOSIS — R91.8 LUNG MASS: ICD-10-CM

## 2024-10-11 LAB — GLUCOSE BLD-MCNC: 95 MG/DL (ref 74–99)

## 2024-10-11 PROCEDURE — 82962 GLUCOSE BLOOD TEST: CPT

## 2024-10-11 PROCEDURE — 78815 PET IMAGE W/CT SKULL-THIGH: CPT

## 2024-10-11 PROCEDURE — 3430000000 HC RX DIAGNOSTIC RADIOPHARMACEUTICAL: Performed by: RADIOLOGY

## 2024-10-11 PROCEDURE — A9609 HC RX DIAGNOSTIC RADIOPHARMACEUTICAL: HCPCS | Performed by: RADIOLOGY

## 2024-10-11 RX ORDER — FLUDEOXYGLUCOSE F 18 200 MCI/ML
15 INJECTION, SOLUTION INTRAVENOUS
Status: COMPLETED | OUTPATIENT
Start: 2024-10-11 | End: 2024-10-11

## 2024-10-11 RX ADMIN — FLUDEOXYGLUCOSE F 18 15 MILLICURIE: 200 INJECTION, SOLUTION INTRAVENOUS at 12:34

## 2024-10-11 NOTE — H&P
Department of Internal Medicine  Pulmonary Medicine  Outpatient Procedure H&P    Procedure:  Bronchoscopy, BAL, TBBX, FNA, Robotic bronchoscopy    Indication:  Repeat bronchoscopy at the request of Dr. Melendez to biopsy RLL sup segment lesion    Referring  Physician:    Dr. Melendez    Brief history: Patient with known history of emphysema, mass of RUL and RLL.     Past Medical History:   Diagnosis Date    Bladder spasm     COPD (chronic obstructive pulmonary disease) (HCC)     Diarrhea     GERD (gastroesophageal reflux disease)     Hypertension     Mass of lower lobe of right lung     Mass of upper lobe of right lung          Past Surgical History:   Procedure Laterality Date    APPENDECTOMY  01/01/1995    BRONCHOSCOPY N/A 02/22/2024    BRONCHOSCOPY ENDOBRONCHIAL ULTRASOUND performed by Tristan Caruso MD at [facility] [place]    BRONCHOSCOPY N/A 02/22/2024    BRONCHOSCOPY W/EBUS FNA ROBOTIC/CINE performed by Tristan Caruso MD at [facility]OSCOPY    BRONCHOSCOPY  02/22/2024    BRONCHOSCOPY ALVEOLAR LAVAGE performed by Tristan Caruso MD at [facility]OSCOPY    BRONCHOSCOPY  02/22/2024    BRONCHOSCOPY BRUSHINGS performed by Tristan Caruso MD at [facility]OSCOPY    BRONCHOSCOPY  02/22/2024    BRONCHOSCOPY BIOPSY BRONCHUS performed by Tristan Caruso MD at [facility]OSCOPY    BRONCHOSCOPY N/A 09/18/2024    BRONCHOSCOPY ENDOBRONCHIAL ULTRASOUND FINE NEEDLE ASPIRATION ROBOTIC performed by Vee Estes DO at [facility]OSCOPY    CHOLECYSTECTOMY, LAPAROSCOPIC N/A 06/23/2021    LAPAROSCOPIC ROBOTIC XI ASSISTED CHOLECYSTECTOMY performed by Davina Mata MD at [facility] OR    COLONOSCOPY  08/19/2015    ENDOSCOPY, COLON, DIAGNOSTIC  08/19/2015    UPPER GASTROINTESTINAL ENDOSCOPY N/A 06/11/2021    EGD BIOPSY performed by Davina Mata MD at [facility] ENDOSCOPY       No current facility-administered medications for this encounter.     Current Outpatient Medications   Medication Sig Dispense Refill    oxyBUTYnin (DITROPAN-XL) 10 MG

## 2024-10-15 RX ORDER — CARBAMIDE PEROXIDE 65 MG/ML
LIQUID TOPICAL
Qty: 15 ML | Refills: 10 | OUTPATIENT
Start: 2024-10-15

## 2024-10-17 ENCOUNTER — SCHEDULED TELEPHONE ENCOUNTER (OUTPATIENT)
Dept: ONCOLOGY | Age: 64
End: 2024-10-17

## 2024-10-17 DIAGNOSIS — R91.8 LUNG MASS: Primary | ICD-10-CM

## 2024-10-17 NOTE — PROGRESS NOTES
Attempted to call patient, no answer but left VM; she was able to call back. I attempted to call back again. No answer, left another message. OK to reschedule any time.

## 2024-11-08 DIAGNOSIS — J44.9 CHRONIC OBSTRUCTIVE PULMONARY DISEASE, UNSPECIFIED COPD TYPE (HCC): ICD-10-CM

## 2024-11-11 RX ORDER — ALBUTEROL SULFATE 0.83 MG/ML
SOLUTION RESPIRATORY (INHALATION)
Qty: 360 ML | Refills: 10 | Status: SHIPPED | OUTPATIENT
Start: 2024-11-11

## 2024-11-11 RX ORDER — ALBUTEROL SULFATE 90 UG/1
AEROSOL, METERED RESPIRATORY (INHALATION)
Qty: 18 G | Refills: 10 | Status: SHIPPED | OUTPATIENT
Start: 2024-11-11

## 2024-11-11 NOTE — TELEPHONE ENCOUNTER
Name of Medication(s) Requested:  Requested Prescriptions     Pending Prescriptions Disp Refills    VENTOLIN  (90 Base) MCG/ACT inhaler [Pharmacy Med Name: VENTOLIN HFA 90MCG INH X1 108 (90 BAS Aerosol] 18 g 10     Sig: INHALE TWO (2) PUFFS BY MOUTH EVERY 6 HOURS AS NEEDED FOR SHORTNESS OF BREATH OR FOR WHEEZING    albuterol (PROVENTIL) (2.5 MG/3ML) 0.083% nebulizer solution [Pharmacy Med Name: ALBUTEROL 0.083% 60CT (2.5 MG/3ML Nebulization Solution] 360 mL 10     Sig: INHALE 1 VIAL VIA NEBULIZER EVERY 6 HOURS AS NEEDED FOR WHEEZING       Medication is on current medication list Yes    Dosage and directions were verified? Yes    Quantity verified: 90 day supply     Pharmacy Verified?  Yes    Last Appointment:  7/1/2024    Future appts:  Future Appointments   Date Time Provider Department Center   11/14/2024  3:30 PM Hipolito Melendez MD MED ONC [facility]   11/18/2024  9:20 AM Mike Hsu MD [facility] DEP        (If no appt send self scheduling link. .REFILLAPPT)  Scheduling request sent?     [] Yes  [x] No    Does patient need updated?  [] Yes  [x] No

## 2024-11-14 ENCOUNTER — SCHEDULED TELEPHONE ENCOUNTER (OUTPATIENT)
Dept: ONCOLOGY | Age: 64
End: 2024-11-14
Payer: COMMERCIAL

## 2024-11-14 DIAGNOSIS — R91.8 LUNG MASS: Primary | ICD-10-CM

## 2024-11-14 PROCEDURE — 99442 PR PHYS/QHP TELEPHONE EVALUATION 11-20 MIN: CPT | Performed by: STUDENT IN AN ORGANIZED HEALTH CARE EDUCATION/TRAINING PROGRAM

## 2024-11-14 NOTE — PROGRESS NOTES
[facility] PHYSICIANS [facility] CARE [facility] MED ONCOLOGY  [address]  [facility] [address]  Dept: [phone]  Loc: [phone]    Clinic Progress Note      Date of Encounter: 11/14/2024     Referring Provider:  Adrian Yoon III, MD    Reason for Visit:   Right lung nodules/masses x2        PCP:  Mike Hsu MD    Demographics: 64 y.o. female    Chief Complaint   Patient presents with    Follow-up     Telephone visit         Subjective:  Telephone visit today.  No major issues.  Here to discuss results and next steps of management.        HPI from Initial Outpatient Consultation  (01/25/2024):  The patient is a 63 y.o. female patient comes in for evaluation for right-sided lung nodules, 1 of which is located on the right upper lobe and the other being on the right lower lobe.  Per chart review, these nodules appear to have been detected on CT lung cancer screening about 1.5 years ago.  PET scan had been done on 7/6/2022, without significant FDG avid findings.    It was not until more recently the last few months, when CT chest suggested increase in size of nodules.  And PET scan now shows increased FDG avidity.  The patient has been evaluated by CT surgery, reported poor candidate for pneumonectomy.  She was sent to see radiation oncology, and then patient is meeting me today.    Otherwise patient denies of new or worrisome symptoms.  Denies of any pain.  She was accompanied by her sister today.              Past Medical History:   Diagnosis Date    Bladder spasm     COPD (chronic obstructive pulmonary disease) (HCC)     Diarrhea     GERD (gastroesophageal reflux disease)     Hypertension     Mass of lower lobe of right lung     Mass of upper lobe of right lung        Past Surgical History:   Procedure Laterality Date    APPENDECTOMY  01/01/1995    BRONCHOSCOPY N/A 02/22/2024    BRONCHOSCOPY ENDOBRONCHIAL ULTRASOUND performed by Tristan Caruso MD at [facility] [place]

## 2024-11-18 ENCOUNTER — OFFICE VISIT (OUTPATIENT)
Dept: FAMILY MEDICINE CLINIC | Age: 64
End: 2024-11-18

## 2024-11-18 VITALS
SYSTOLIC BLOOD PRESSURE: 126 MMHG | WEIGHT: 107 LBS | HEIGHT: 64 IN | DIASTOLIC BLOOD PRESSURE: 80 MMHG | TEMPERATURE: 99 F | BODY MASS INDEX: 18.27 KG/M2 | HEART RATE: 92 BPM | OXYGEN SATURATION: 97 %

## 2024-11-18 DIAGNOSIS — F33.1 MODERATE EPISODE OF RECURRENT MAJOR DEPRESSIVE DISORDER (HCC): ICD-10-CM

## 2024-11-18 DIAGNOSIS — C34.90 MALIGNANT NEOPLASM OF LUNG, UNSPECIFIED LATERALITY, UNSPECIFIED PART OF LUNG (HCC): Primary | ICD-10-CM

## 2024-11-18 DIAGNOSIS — I10 ESSENTIAL HYPERTENSION: ICD-10-CM

## 2024-11-18 RX ORDER — BUPROPION HYDROCHLORIDE 300 MG/1
TABLET ORAL
Qty: 30 TABLET | Refills: 10 | Status: SHIPPED | OUTPATIENT
Start: 2024-11-18

## 2024-11-18 RX ORDER — PAROXETINE 20 MG/1
20 TABLET, FILM COATED ORAL DAILY
Qty: 90 TABLET | Refills: 0 | Status: SHIPPED | OUTPATIENT
Start: 2024-11-18

## 2024-11-18 NOTE — PROGRESS NOTES
[facility]  Office Progress Note - Dr. Hsu  11/18/24    CC:   Chief Complaint   Patient presents with    Skin Problem     Area on left cheek - would like doctor to take a look.        /80   Pulse 92   Temp 99 °F (37.2 °C) (Temporal)   Ht 1.626 m (5' 4\")   Wt 48.5 kg (107 lb)   LMP  (LMP Unknown)   SpO2 97%   BMI 18.37 kg/m²   Wt Readings from Last 3 Encounters:   11/18/24 48.5 kg (107 lb)   09/26/24 48.5 kg (106 lb 14.4 oz)   09/23/24 46.3 kg (102 lb)       HPI: She has a small superficial venous pool on the left cheek most likely.  Does not appear to be a melanoma type lesion.    She understands the recent findings on her CT scan of the lungs.  She is waiting to hear from the cardiothoracic surgery office.  If surgery remains not an option, she may receive further radiation treatments.  Working with oncology.    Hypertension  Follow-up  Blood pressure is  controlled today.  BP Readings from Last 3 Encounters:   11/18/24 126/80   09/26/24 (!) 147/69   09/18/24 112/69     Patient continues medications regularly. Compliance is good.  Denies CP, sob, abd pain, headaches, vision changes, dizziness, hypotensive symptoms.   No side effects from medications noted.      Socially, her furnace is broke.   She does not anticipate fixing at this winter.  She does have a place she can stay at her sister's.    Mood has been more anxious with dealing with increased stresses and uncertainty of her medical condition.  _________________________________________________________    Assessment / Plan  Kenji was seen today for skin problem.    Diagnoses and all orders for this visit:    Malignant neoplasm of lung, unspecified laterality, unspecified part of lung (HCC)  Follow-up with cardiothoracic surgery this week if they did not contact her by Wednesday about a date.  Continues to follow with oncology.  She was able to recount to me the current situation which reflects the progress notes from

## 2024-11-19 DIAGNOSIS — R91.1 PULMONARY NODULE: Primary | ICD-10-CM

## 2024-11-19 NOTE — PROGRESS NOTES
Cardiothoracic Surgery       Subjective     Patient ID: Kenji Gan     CC:     HPI:  Kenji Gan is a 64 y.o. female with pertinent past medical history of COPD, GED, HTN and lung mass who presents to the office to discuss surgery. The patient is known to me in the sense that I have seen her since 2022 to discuss surgical options of a lung mass. In 2022, chest CT remained stable, and it was elected to follow-up with the patient in 3 months with an additional CT. The patient was lost to follow-up for a year. At her appointment on 12/12/23, the patient's RLL nodule had unfortunately grown considerably, and she also had PET positive RUL and RLL nodules. Pneumonectomy was not possible, and she was sent to Dr. Yoon for SBRT. The patient went forward with biopsies of the RUL and RLL nodules, however both were negative for malignancy. Oncology decided it would be best for the patient to undergo SBRT pre-emptively due to the nature of the nodules increasing in size and no infectious symptoms. She started radiation in May 2024. In August 2024, chest CT showed increase in size of the RLL nodule with stable RUL nodule. Again, the patient underwent EBUS with results inconclusive for malignancy. PET scan was obtained on 10/14/24, which showed decreased uptake in the RUL nodule but increased uptake in the RLL nodule with interval enlargement. The patient presents today to be evaluated again for lobectomy. Today she denies CP, SOB, fever, chills, N/V.       Past Medical History:   Diagnosis Date    Bladder spasm     COPD (chronic obstructive pulmonary disease) (HCC)     Diarrhea     GERD (gastroesophageal reflux disease)     Hypertension     Mass of lower lobe of right lung     Mass of upper lobe of right lung           Review of Systems:   Review of Systems   Constitutional:  Negative for chills, diaphoresis, fatigue and fever.

## 2024-11-22 ENCOUNTER — HOSPITAL ENCOUNTER (OUTPATIENT)
Dept: PULMONOLOGY | Age: 64
Discharge: HOME | End: 2024-11-22
Payer: COMMERCIAL

## 2024-11-22 DIAGNOSIS — R91.1 PULMONARY NODULE: ICD-10-CM

## 2024-11-22 PROCEDURE — 94729 DIFFUSING CAPACITY: CPT

## 2024-11-22 PROCEDURE — 94375 RESPIRATORY FLOW VOLUME LOOP: CPT

## 2024-11-26 ENCOUNTER — INITIAL CONSULT (OUTPATIENT)
Dept: CARDIOTHORACIC SURGERY | Age: 64
End: 2024-11-26
Payer: COMMERCIAL

## 2024-11-26 VITALS
HEART RATE: 72 BPM | DIASTOLIC BLOOD PRESSURE: 80 MMHG | TEMPERATURE: 97.8 F | BODY MASS INDEX: 18.27 KG/M2 | SYSTOLIC BLOOD PRESSURE: 126 MMHG | WEIGHT: 107 LBS | HEIGHT: 64 IN | OXYGEN SATURATION: 95 %

## 2024-11-26 DIAGNOSIS — C34.90 MALIGNANT NEOPLASM OF LUNG, UNSPECIFIED LATERALITY, UNSPECIFIED PART OF LUNG (HCC): Primary | ICD-10-CM

## 2024-11-26 PROCEDURE — G8419 CALC BMI OUT NRM PARAM NOF/U: HCPCS | Performed by: THORACIC SURGERY (CARDIOTHORACIC VASCULAR SURGERY)

## 2024-11-26 PROCEDURE — G8484 FLU IMMUNIZE NO ADMIN: HCPCS | Performed by: THORACIC SURGERY (CARDIOTHORACIC VASCULAR SURGERY)

## 2024-11-26 PROCEDURE — 4004F PT TOBACCO SCREEN RCVD TLK: CPT | Performed by: THORACIC SURGERY (CARDIOTHORACIC VASCULAR SURGERY)

## 2024-11-26 PROCEDURE — 99215 OFFICE O/P EST HI 40 MIN: CPT | Performed by: THORACIC SURGERY (CARDIOTHORACIC VASCULAR SURGERY)

## 2024-11-26 PROCEDURE — 3079F DIAST BP 80-89 MM HG: CPT | Performed by: THORACIC SURGERY (CARDIOTHORACIC VASCULAR SURGERY)

## 2024-11-26 PROCEDURE — 3017F COLORECTAL CA SCREEN DOC REV: CPT | Performed by: THORACIC SURGERY (CARDIOTHORACIC VASCULAR SURGERY)

## 2024-11-26 PROCEDURE — G8427 DOCREV CUR MEDS BY ELIG CLIN: HCPCS | Performed by: THORACIC SURGERY (CARDIOTHORACIC VASCULAR SURGERY)

## 2024-11-26 PROCEDURE — 3074F SYST BP LT 130 MM HG: CPT | Performed by: THORACIC SURGERY (CARDIOTHORACIC VASCULAR SURGERY)

## 2024-12-12 ENCOUNTER — SCHEDULED TELEPHONE ENCOUNTER (OUTPATIENT)
Dept: ONCOLOGY | Age: 64
End: 2024-12-12

## 2024-12-12 DIAGNOSIS — R91.8 LUNG MASS: Primary | ICD-10-CM

## 2024-12-13 RX ORDER — LANOLIN ALCOHOL/MO/W.PET/CERES
1000 CREAM (GRAM) TOPICAL DAILY
Qty: 30 TABLET | Refills: 10 | Status: SHIPPED | OUTPATIENT
Start: 2024-12-13

## 2024-12-13 NOTE — TELEPHONE ENCOUNTER
Last Appointment:  11/18/2024  Future Appointments   Date Time Provider Department Center   5/22/2025  8:20 AM Mike Hsu MD [facility] ECC DEP

## 2024-12-13 NOTE — PROGRESS NOTES
Attempted to call patient for Telephone visit. No answer. Left VM.  Will need to reschedule again.

## 2024-12-19 DIAGNOSIS — F33.1 MODERATE EPISODE OF RECURRENT MAJOR DEPRESSIVE DISORDER (HCC): ICD-10-CM

## 2024-12-20 RX ORDER — PAROXETINE 20 MG/1
20 TABLET, FILM COATED ORAL DAILY
Qty: 30 TABLET | Refills: 10 | Status: SHIPPED | OUTPATIENT
Start: 2024-12-20

## 2024-12-20 NOTE — TELEPHONE ENCOUNTER
Name of Medication(s) Requested:  Requested Prescriptions     Pending Prescriptions Disp Refills    PARoxetine (PAXIL) 20 MG tablet [Pharmacy Med Name: PAROXETINE 20MG TAB 20 Tablet] 30 tablet 10     Sig: TAKE 1 TABLET BY MOUTH DAILY       Medication is on current medication list Yes    Dosage and directions were verified? Yes    Quantity verified: 90 day supply     Pharmacy Verified?  Yes    Last Appointment:  11/18/2024    Future appts:  Future Appointments   Date Time Provider Department Center   5/22/2025  8:20 AM Mike Hsu MD [facility] ECC DEP        (If no appt send self scheduling link. .REFILLAPPT)  Scheduling request sent?     [] Yes  [x] No    Does patient need updated?  [] Yes  [x] No

## 2025-01-09 ENCOUNTER — HOSPITAL ENCOUNTER (OUTPATIENT)
Dept: INFUSION THERAPY | Age: 65
Discharge: HOME | End: 2025-01-09

## 2025-01-09 ENCOUNTER — OFFICE VISIT (OUTPATIENT)
Dept: ONCOLOGY | Age: 65
End: 2025-01-09
Payer: COMMERCIAL

## 2025-01-09 VITALS
BODY MASS INDEX: 19.31 KG/M2 | WEIGHT: 113.1 LBS | TEMPERATURE: 97.3 F | HEART RATE: 98 BPM | DIASTOLIC BLOOD PRESSURE: 65 MMHG | SYSTOLIC BLOOD PRESSURE: 139 MMHG | OXYGEN SATURATION: 96 % | HEIGHT: 64 IN

## 2025-01-09 DIAGNOSIS — R91.8 LUNG MASS: Primary | ICD-10-CM

## 2025-01-09 PROCEDURE — 99213 OFFICE O/P EST LOW 20 MIN: CPT | Performed by: STUDENT IN AN ORGANIZED HEALTH CARE EDUCATION/TRAINING PROGRAM

## 2025-01-09 PROCEDURE — 99213 OFFICE O/P EST LOW 20 MIN: CPT

## 2025-01-09 PROCEDURE — 4004F PT TOBACCO SCREEN RCVD TLK: CPT | Performed by: STUDENT IN AN ORGANIZED HEALTH CARE EDUCATION/TRAINING PROGRAM

## 2025-01-09 PROCEDURE — 3017F COLORECTAL CA SCREEN DOC REV: CPT | Performed by: STUDENT IN AN ORGANIZED HEALTH CARE EDUCATION/TRAINING PROGRAM

## 2025-01-09 PROCEDURE — 3078F DIAST BP <80 MM HG: CPT | Performed by: STUDENT IN AN ORGANIZED HEALTH CARE EDUCATION/TRAINING PROGRAM

## 2025-01-09 PROCEDURE — G8427 DOCREV CUR MEDS BY ELIG CLIN: HCPCS | Performed by: STUDENT IN AN ORGANIZED HEALTH CARE EDUCATION/TRAINING PROGRAM

## 2025-01-09 PROCEDURE — 3075F SYST BP GE 130 - 139MM HG: CPT | Performed by: STUDENT IN AN ORGANIZED HEALTH CARE EDUCATION/TRAINING PROGRAM

## 2025-01-09 PROCEDURE — G8420 CALC BMI NORM PARAMETERS: HCPCS | Performed by: STUDENT IN AN ORGANIZED HEALTH CARE EDUCATION/TRAINING PROGRAM

## 2025-01-09 NOTE — PROGRESS NOTES
Patient did NOT stop at check out window, left without AVS.  Patient has MYCHART.    
NEBULIZER EVERY 6 HOURS AS NEEDED FOR WHEEZING 360 mL 10    oxyBUTYnin (DITROPAN-XL) 10 MG extended release tablet Take 1 tablet by mouth daily      lisinopril-hydroCHLOROthiazide (PRINZIDE;ZESTORETIC) 20-12.5 MG per tablet TAKE 1 TABLET BY MOUTH DAILY 30 tablet 10    SPIRIVA HANDIHALER 18 MCG inhalation capsule INHALE THE CONTENTS OF ONE (1) CAPSULE BY MOUTH VIA HANDIHALER ONCE DAILY AS DIRECTED *DO NOT SWALLOW CAPSULE* 30 capsule 10    ADVAIR DISKUS 250-50 MCG/ACT AEPB diskus inhaler INHALE ONE (1) PUFF BY MOUTH TWICE DAILY IN THE MORNING AND EVENING APPROXIMATELY 12 HOURS APART *RINSE MOUTH AND GARGLE AFTER USE* 60 each 10     No current facility-administered medications on file prior to visit.     Reviewed and reconciled.    No Known Allergies          REVIEW OF SYSTEMS:  CONSTITUTIONAL :  No fever, chills, fatigue, night sweats, or unintended weight loss  EYES: No discharge, itchiness, pain, redness  ENMT: No mouth sores, thrush, sore throat,  dysphagia, hoarseness of voice   RESPIRATORY: Chronic intermittent cough.   CARDIOVASCULAR: No chest pain, palpitations  GASTROINTESTINAL: No nausea, vomiting, abdominal pain   GENITOURINARY: No dysuria, urinary frequency, hematuria  ENDOCRINE: No polydipsia, polyuria, cold or heat intolerance.  MUSCULOSKELETAL: No arthralgias, myalgias, or bony pain  INTEGUMENT.: No skin rash or bruises.  HEM/LYMPHATICS: No adenopathy, bruising or prolonged bleeding  NEURO: No headaches or vision changes.      Vitals:    01/09/25 1408   BP: 139/65   Pulse: 98   Temp: 97.3 °F (36.3 °C)   SpO2: 96%       PHYSICAL EXAM:  /65   Pulse 98   Temp 97.3 °F (36.3 °C)   Ht 1.626 m (5' 4\")   Wt 51.3 kg (113 lb 1.6 oz)   LMP  (LMP Unknown)   SpO2 96%   BMI 19.41 kg/m²   GENERAL: Well developed, well nourished; in no acute distress  HEENT:  Nornmocephalic, atraumatic.   EOMI. No scleral icterus. Oropharynx clear.   NECK:  Supple.  Normal range of motion.  LUNGS:  Clear to auscultation

## 2025-01-13 PROBLEM — R91.1 PULMONARY NODULE: Status: ACTIVE | Noted: 2025-01-13

## 2025-01-16 ENCOUNTER — HOSPITAL ENCOUNTER (OUTPATIENT)
Dept: PET IMAGING | Age: 65
Discharge: HOME | End: 2025-01-18
Attending: STUDENT IN AN ORGANIZED HEALTH CARE EDUCATION/TRAINING PROGRAM
Payer: COMMERCIAL

## 2025-01-16 DIAGNOSIS — J44.9 CHRONIC OBSTRUCTIVE PULMONARY DISEASE, UNSPECIFIED COPD TYPE (HCC): ICD-10-CM

## 2025-01-16 DIAGNOSIS — R91.8 LUNG MASS: ICD-10-CM

## 2025-01-16 LAB — GLUCOSE BLD-MCNC: 99 MG/DL (ref 74–99)

## 2025-01-16 PROCEDURE — A9609 HC RX DIAGNOSTIC RADIOPHARMACEUTICAL: HCPCS | Performed by: RADIOLOGY

## 2025-01-16 PROCEDURE — 82962 GLUCOSE BLOOD TEST: CPT

## 2025-01-16 PROCEDURE — 3430000000 HC RX DIAGNOSTIC RADIOPHARMACEUTICAL: Performed by: RADIOLOGY

## 2025-01-16 RX ORDER — FLUDEOXYGLUCOSE F 18 200 MCI/ML
15 INJECTION, SOLUTION INTRAVENOUS
Status: COMPLETED | OUTPATIENT
Start: 2025-01-16 | End: 2025-01-16

## 2025-01-16 RX ADMIN — FLUDEOXYGLUCOSE F 18 15 MILLICURIE: 200 INJECTION, SOLUTION INTRAVENOUS at 08:36

## 2025-01-17 RX ORDER — TIOTROPIUM BROMIDE 18 UG/1
CAPSULE ORAL; RESPIRATORY (INHALATION)
Qty: 30 CAPSULE | Refills: 10 | Status: SHIPPED | OUTPATIENT
Start: 2025-01-17

## 2025-01-17 RX ORDER — FLUTICASONE PROPIONATE AND SALMETEROL 50; 250 UG/1; UG/1
POWDER RESPIRATORY (INHALATION)
Qty: 60 EACH | Refills: 10 | Status: SHIPPED | OUTPATIENT
Start: 2025-01-17

## 2025-01-17 RX ORDER — OXYBUTYNIN CHLORIDE 10 MG/1
TABLET, EXTENDED RELEASE ORAL
Qty: 30 TABLET | Refills: 10 | Status: SHIPPED | OUTPATIENT
Start: 2025-01-17

## 2025-01-17 NOTE — TELEPHONE ENCOUNTER
Name of Medication(s) Requested:  Requested Prescriptions     Pending Prescriptions Disp Refills    SPIRIVA HANDIHALER 18 MCG inhalation capsule [Pharmacy Med Name: SPIRIVA 18 MCG CP-HANDIHALE 18 Capsule] 30 capsule 10     Sig: INHALE THE CONTENTS OF ONE (1) CAPSULE BY MOUTH VIA HANDIHALER ONCE DAILY AS DIRECTED *DO NOT SWALLOW CAPSULE*    ADVAIR DISKUS 250-50 MCG/ACT AEPB diskus inhaler [Pharmacy Med Name: ADVAIR 250/50 DISKUS 60CT 250-50 Aerosol] 60 each 10     Sig: INHALE ONE (1) PUFF BY MOUTH TWICE DAILY IN THE MORNING AND EVENING APPROXIMATELY 12 HOURS APART *RINSE MOUTH AND GARGLE AFTER USE*    oxyBUTYnin (DITROPAN-XL) 10 MG extended release tablet [Pharmacy Med Name: OXYBUTYNIN ER 10MG TAB 10 Tablet] 30 tablet 10     Sig: TAKE 1 TABLET BY MOUTH DAILY FOR BLADDER SPASMS       Medication is on current medication list Yes    Dosage and directions were verified? Yes    Quantity verified: 90 day supply     Pharmacy Verified?  Yes    Last Appointment:  11/18/2024    Future appts:  Future Appointments   Date Time Provider Department Center   1/23/2025  2:30 PM [facility] MED ONC FAST TRACK 2 [facility] Med Onc [facility]   1/23/2025  2:45 PM Hipolito Melendez MD MED ONC [facility]   5/22/2025  8:20 AM Mike Hsu MD [facility] DEP        (If no appt send self scheduling link. .REFILLAPPT)  Scheduling request sent?     [] Yes  [x] No    Does patient need updated?  [] Yes  [x] No

## 2025-01-23 ENCOUNTER — SCHEDULED TELEPHONE ENCOUNTER (OUTPATIENT)
Dept: ONCOLOGY | Age: 65
End: 2025-01-23

## 2025-01-23 ENCOUNTER — HOSPITAL ENCOUNTER (OUTPATIENT)
Dept: INFUSION THERAPY | Age: 65
End: 2025-01-23

## 2025-01-23 DIAGNOSIS — R91.8 LUNG MASS: Primary | ICD-10-CM

## 2025-01-23 DIAGNOSIS — J18.9 LUNG INFECTION: ICD-10-CM

## 2025-01-23 RX ORDER — CEFDINIR 300 MG/1
300 CAPSULE ORAL 2 TIMES DAILY
Qty: 20 CAPSULE | Refills: 0 | Status: SHIPPED | OUTPATIENT
Start: 2025-01-23 | End: 2025-02-02

## 2025-01-23 NOTE — PROGRESS NOTES
[facility] PHYSICIANS [facility] CARE [facility] MED ONCOLOGY  [address]  [facility] [address]  Dept: [phone]  Loc: [phone]    Clinic Progress Note      Date of Encounter: 01/23/2025     Referring Provider:  Adrian Yoon III, MD    Reason for Visit:   Right lung nodules/masses x2        PCP:  Mike Hsu MD    Demographics: 64 y.o. female    Chief Complaint   Patient presents with    Results         Subjective:  Telephone visit today. PET completed.   No new issues or symptoms.         HPI from Initial Outpatient Consultation  (01/25/2024):  The patient is a 63 y.o. female patient comes in for evaluation for right-sided lung nodules, 1 of which is located on the right upper lobe and the other being on the right lower lobe.  Per chart review, these nodules appear to have been detected on CT lung cancer screening about 1.5 years ago.  PET scan had been done on 7/6/2022, without significant FDG avid findings.    It was not until more recently the last few months, when CT chest suggested increase in size of nodules.  And PET scan now shows increased FDG avidity.  The patient has been evaluated by CT surgery, reported poor candidate for pneumonectomy.  She was sent to see radiation oncology, and then patient is meeting me today.    Otherwise patient denies of new or worrisome symptoms.  Denies of any pain.  She was accompanied by her sister today.              Past Medical History:   Diagnosis Date    Bladder spasm     COPD (chronic obstructive pulmonary disease) (HCC)     Diarrhea     GERD (gastroesophageal reflux disease)     Hypertension     Mass of lower lobe of right lung     Mass of upper lobe of right lung        Past Surgical History:   Procedure Laterality Date    APPENDECTOMY  01/01/1995    BRONCHOSCOPY N/A 02/22/2024    BRONCHOSCOPY ENDOBRONCHIAL ULTRASOUND [place] [facility] [place]    BRONCHOSCOPY N/A 02/22/2024    BRONCHOSCOPY W/EBUS FNA

## 2025-03-19 ENCOUNTER — PATIENT MESSAGE (OUTPATIENT)
Dept: FAMILY MEDICINE CLINIC | Age: 65
End: 2025-03-19

## 2025-03-19 RX ORDER — AMOXICILLIN 500 MG/1
500 CAPSULE ORAL 3 TIMES DAILY
Qty: 21 CAPSULE | Refills: 0 | Status: SHIPPED | OUTPATIENT
Start: 2025-03-19 | End: 2025-03-26

## 2025-04-14 ENCOUNTER — HOSPITAL ENCOUNTER (OUTPATIENT)
Dept: PET IMAGING | Age: 65
Discharge: HOME | End: 2025-04-16
Attending: STUDENT IN AN ORGANIZED HEALTH CARE EDUCATION/TRAINING PROGRAM
Payer: MEDICARE

## 2025-04-14 DIAGNOSIS — R91.8 LUNG MASS: ICD-10-CM

## 2025-04-14 DIAGNOSIS — J18.9 LUNG INFECTION: ICD-10-CM

## 2025-04-14 LAB — GLUCOSE BLD-MCNC: 85 MG/DL (ref 74–99)

## 2025-04-14 PROCEDURE — A9609 HC RX DIAGNOSTIC RADIOPHARMACEUTICAL: HCPCS | Performed by: RADIOLOGY

## 2025-04-14 PROCEDURE — 3430000000 HC RX DIAGNOSTIC RADIOPHARMACEUTICAL: Performed by: RADIOLOGY

## 2025-04-14 PROCEDURE — 78815 PET IMAGE W/CT SKULL-THIGH: CPT

## 2025-04-14 PROCEDURE — 82962 GLUCOSE BLOOD TEST: CPT

## 2025-04-14 RX ORDER — FLUDEOXYGLUCOSE F 18 200 MCI/ML
15 INJECTION, SOLUTION INTRAVENOUS ONCE
Status: COMPLETED | OUTPATIENT
Start: 2025-04-14 | End: 2025-04-14

## 2025-04-14 RX ADMIN — FLUDEOXYGLUCOSE F 18 15 MILLICURIE: 200 INJECTION, SOLUTION INTRAVENOUS at 09:55

## 2025-04-17 ENCOUNTER — SCHEDULED TELEPHONE ENCOUNTER (OUTPATIENT)
Dept: ONCOLOGY | Age: 65
End: 2025-04-17

## 2025-04-17 DIAGNOSIS — J18.9 LUNG INFECTION: ICD-10-CM

## 2025-04-17 DIAGNOSIS — R91.8 LUNG MASS: Primary | ICD-10-CM

## 2025-04-17 NOTE — PROGRESS NOTES
Patient was Telephone virtual visit.  Patient has MyChart.  RTC disposition note:  Return if symptoms worsen or fail to improve.

## 2025-04-17 NOTE — PROGRESS NOTES
[facility] PHYSICIANS [facility] CARE [facility] MED ONCOLOGY  [address]  [facility] [address]  Dept: [phone]  Loc: [phone]    Clinic Progress Note      Date of Encounter: 04/17/2025     Referring Provider:  Adrian Yoon III, MD    Reason for Visit:   Right lung nodules/masses x2        PCP:  Mike Hsu MD    Demographics: 64 y.o. female    Chief Complaint   Patient presents with    Results         Subjective:  Telephone visit today. PET completed.   No new issues or symptoms.         HPI from Initial Outpatient Consultation  (01/25/2024):  The patient is a 63 y.o. female patient comes in for evaluation for right-sided lung nodules, 1 of which is located on the right upper lobe and the other being on the right lower lobe.  Per chart review, these nodules appear to have been detected on CT lung cancer screening about 1.5 years ago.  PET scan had been done on 7/6/2022, without significant FDG avid findings.    It was not until more recently the last few months, when CT chest suggested increase in size of nodules.  And PET scan now shows increased FDG avidity.  The patient has been evaluated by CT surgery, reported poor candidate for pneumonectomy.  She was sent to see radiation oncology, and then patient is meeting me today.    Otherwise patient denies of new or worrisome symptoms.  Denies of any pain.  She was accompanied by her sister today.              Past Medical History:   Diagnosis Date    Bladder spasm     COPD (chronic obstructive pulmonary disease) (HCC)     Diarrhea     GERD (gastroesophageal reflux disease)     Hypertension     Mass of lower lobe of right lung     Mass of upper lobe of right lung        Past Surgical History:   Procedure Laterality Date    APPENDECTOMY  01/01/1995    BRONCHOSCOPY N/A 02/22/2024    BRONCHOSCOPY ENDOBRONCHIAL ULTRASOUND performed by Tristan Caruso MD at [facility] [place]    BRONCHOSCOPY N/A 02/22/2024    BRONCHOSCOPY W/EBUS FNA

## 2025-05-22 ENCOUNTER — OFFICE VISIT (OUTPATIENT)
Dept: FAMILY MEDICINE CLINIC | Age: 65
End: 2025-05-22
Payer: MEDICARE

## 2025-05-22 VITALS
OXYGEN SATURATION: 97 % | HEIGHT: 64 IN | RESPIRATION RATE: 18 BRPM | TEMPERATURE: 98.2 F | HEART RATE: 84 BPM | WEIGHT: 110 LBS | DIASTOLIC BLOOD PRESSURE: 82 MMHG | SYSTOLIC BLOOD PRESSURE: 130 MMHG | BODY MASS INDEX: 18.78 KG/M2

## 2025-05-22 DIAGNOSIS — F33.1 MODERATE EPISODE OF RECURRENT MAJOR DEPRESSIVE DISORDER (HCC): ICD-10-CM

## 2025-05-22 DIAGNOSIS — Z00.00 WELCOME TO MEDICARE PREVENTIVE VISIT: Primary | ICD-10-CM

## 2025-05-22 DIAGNOSIS — H91.90 HARD OF HEARING: ICD-10-CM

## 2025-05-22 PROBLEM — J44.1 ACUTE EXACERBATION OF CHRONIC OBSTRUCTIVE PULMONARY DISEASE (COPD) (HCC): Status: RESOLVED | Noted: 2020-08-18 | Resolved: 2025-05-22

## 2025-05-22 PROBLEM — C34.90 MALIGNANT NEOPLASM OF LUNG, UNSPECIFIED LATERALITY, UNSPECIFIED PART OF LUNG (HCC): Status: RESOLVED | Noted: 2024-02-16 | Resolved: 2025-05-22

## 2025-05-22 PROCEDURE — 1123F ACP DISCUSS/DSCN MKR DOCD: CPT | Performed by: FAMILY MEDICINE

## 2025-05-22 PROCEDURE — 3075F SYST BP GE 130 - 139MM HG: CPT | Performed by: FAMILY MEDICINE

## 2025-05-22 PROCEDURE — 3017F COLORECTAL CA SCREEN DOC REV: CPT | Performed by: FAMILY MEDICINE

## 2025-05-22 PROCEDURE — 3079F DIAST BP 80-89 MM HG: CPT | Performed by: FAMILY MEDICINE

## 2025-05-22 PROCEDURE — G0402 INITIAL PREVENTIVE EXAM: HCPCS | Performed by: FAMILY MEDICINE

## 2025-05-22 RX ORDER — LISINOPRIL AND HYDROCHLOROTHIAZIDE 12.5; 2 MG/1; MG/1
1 TABLET ORAL DAILY
Qty: 30 TABLET | Refills: 10 | Status: SHIPPED | OUTPATIENT
Start: 2025-05-22

## 2025-05-22 RX ORDER — LANOLIN ALCOHOL/MO/W.PET/CERES
1000 CREAM (GRAM) TOPICAL DAILY
Qty: 30 TABLET | Refills: 10 | Status: SHIPPED | OUTPATIENT
Start: 2025-05-22

## 2025-05-22 RX ORDER — BUPROPION HYDROCHLORIDE 300 MG/1
TABLET ORAL
Qty: 30 TABLET | Refills: 10 | Status: SHIPPED | OUTPATIENT
Start: 2025-05-22

## 2025-05-22 RX ORDER — BUDESONIDE AND FORMOTEROL FUMARATE DIHYDRATE 160; 4.5 UG/1; UG/1
2 AEROSOL RESPIRATORY (INHALATION) 2 TIMES DAILY
Qty: 10.2 G | Refills: 3 | Status: SHIPPED | OUTPATIENT
Start: 2025-05-22

## 2025-05-22 RX ORDER — PANTOPRAZOLE SODIUM 40 MG/1
40 TABLET, DELAYED RELEASE ORAL
Qty: 30 TABLET | Refills: 5 | Status: SHIPPED | OUTPATIENT
Start: 2025-05-22

## 2025-05-22 RX ORDER — PAROXETINE 20 MG/1
20 TABLET, FILM COATED ORAL DAILY
Qty: 30 TABLET | Refills: 10 | Status: SHIPPED | OUTPATIENT
Start: 2025-05-22

## 2025-05-22 SDOH — ECONOMIC STABILITY: FOOD INSECURITY: WITHIN THE PAST 12 MONTHS, THE FOOD YOU BOUGHT JUST DIDN'T LAST AND YOU DIDN'T HAVE MONEY TO GET MORE.: NEVER TRUE

## 2025-05-22 SDOH — ECONOMIC STABILITY: FOOD INSECURITY: WITHIN THE PAST 12 MONTHS, YOU WORRIED THAT YOUR FOOD WOULD RUN OUT BEFORE YOU GOT MONEY TO BUY MORE.: NEVER TRUE

## 2025-05-22 ASSESSMENT — LIFESTYLE VARIABLES
HOW MANY STANDARD DRINKS CONTAINING ALCOHOL DO YOU HAVE ON A TYPICAL DAY: PATIENT DOES NOT DRINK
HOW OFTEN DO YOU HAVE A DRINK CONTAINING ALCOHOL: NEVER

## 2025-05-22 ASSESSMENT — PATIENT HEALTH QUESTIONNAIRE - PHQ9
6. FEELING BAD ABOUT YOURSELF - OR THAT YOU ARE A FAILURE OR HAVE LET YOURSELF OR YOUR FAMILY DOWN: NOT AT ALL
10. IF YOU CHECKED OFF ANY PROBLEMS, HOW DIFFICULT HAVE THESE PROBLEMS MADE IT FOR YOU TO DO YOUR WORK, TAKE CARE OF THINGS AT HOME, OR GET ALONG WITH OTHER PEOPLE: NOT DIFFICULT AT ALL
SUM OF ALL RESPONSES TO PHQ QUESTIONS 1-9: 0
5. POOR APPETITE OR OVEREATING: NOT AT ALL
4. FEELING TIRED OR HAVING LITTLE ENERGY: NOT AT ALL
SUM OF ALL RESPONSES TO PHQ QUESTIONS 1-9: 0
1. LITTLE INTEREST OR PLEASURE IN DOING THINGS: NOT AT ALL
2. FEELING DOWN, DEPRESSED OR HOPELESS: NOT AT ALL
SUM OF ALL RESPONSES TO PHQ QUESTIONS 1-9: 0
7. TROUBLE CONCENTRATING ON THINGS, SUCH AS READING THE NEWSPAPER OR WATCHING TELEVISION: NOT AT ALL
3. TROUBLE FALLING OR STAYING ASLEEP: NOT AT ALL
9. THOUGHTS THAT YOU WOULD BE BETTER OFF DEAD, OR OF HURTING YOURSELF: NOT AT ALL
8. MOVING OR SPEAKING SO SLOWLY THAT OTHER PEOPLE COULD HAVE NOTICED. OR THE OPPOSITE, BEING SO FIGETY OR RESTLESS THAT YOU HAVE BEEN MOVING AROUND A LOT MORE THAN USUAL: NOT AT ALL
SUM OF ALL RESPONSES TO PHQ QUESTIONS 1-9: 0

## 2025-05-22 NOTE — PATIENT INSTRUCTIONS
Chest pain or pressure, or a strange feeling in the chest.     Sweating.     Shortness of breath.     Pain, pressure, or a strange feeling in the back, neck, jaw, or upper belly or in one or both shoulders or arms.     Lightheadedness or sudden weakness.     A fast or irregular heartbeat.   After you call 911, the  may tell you to chew 1 adult-strength or 2 to 4 low-dose aspirin. Wait for an ambulance. Do not try to drive yourself.  Watch closely for changes in your health, and be sure to contact your doctor if you have any problems.  Where can you learn more?  Go to https://www.[employer].net/patientEd and enter F075 to learn more about \"A Healthy Heart: Care Instructions.\"  Current as of: July 31, 2024  Content Version: 14.4  © 3802-8052 [employer].   Care instructions adapted under license by [employer]. If you have questions about a medical condition or this instruction, always ask your healthcare professional. [employer], disclaims any warranty or liability for your use of this information.    Personalized Preventive Plan for Kenji Gan - 5/22/2025  Medicare offers a range of preventive health benefits. Some of the tests and screenings are paid in full while other may be subject to a deductible, co-insurance, and/or copay.  Some of these benefits include a comprehensive review of your medical history including lifestyle, illnesses that may run in your family, and various assessments and screenings as appropriate.  After reviewing your medical record and screening and assessments performed today your provider may have ordered immunizations, labs, imaging, and/or referrals for you.  A list of these orders (if applicable) as well as your Preventive Care list are included within your After Visit Summary for your review.

## 2025-05-22 NOTE — PROGRESS NOTES
Medicare Annual Wellness Visit    Kenji Gan is here for Medicare AWV    Assessment & Plan  1. Esophagitis.  - Reports heartburn and stomach pains, attributing them to esophagitis.  - Manages symptoms with Tums but is open to trying a stronger acid reducer.  - Prescription for Protonix will be provided to see if it alleviates symptoms.  - If symptoms persist, may consider taking the medication for a longer period.    2. Overactive bladder.  - Currently on oxybutynin but feels it may not be necessary.  - Will discontinue oxybutynin to assess if symptoms worsen.  - If symptoms worsen, can resume the medication.  - Monitoring for any changes in urinary urgency or incontinence.    3. Hearing loss.  - Reports difficulty hearing and is interested in exploring hearing aids.  - Referral to the [facility] will be made for an audiological evaluation.  - Discussion of potential hearing aid options.  - Left ear will be cleaned to improve hearing.    4. Constipation.  - Experiences frequent constipation, attributing it to aging.  - Given history of polyp removal, Cologuard test is not recommended.  - Stool test will be conducted to check for blood in the stool.  - If the test is positive, further evaluation will be considered.    5. Medication management.  - Advair will be switched to Symbicort as per insurance company's request.  - Refills for medications will be sent to pharmacy.  - Monitoring effectiveness of Symbicort and Protonix.    Follow-up  - Follow-up scheduled in 3 months.  Moderate episode of recurrent major depressive disorder (HCC)  The following orders have not been finalized:  -     PARoxetine (PAXIL) 20 MG tablet  -     buPROPion (WELLBUTRIN XL) 300 MG extended release tablet  Overall Kenji Gan is doing well.   Age appropriate HM topics reviewed and updated where agreeable.   Vaccines reviewed and updated where agreeable.   Age appropriate anticipatory guidance discussed.   Encouraged to

## 2025-06-11 ENCOUNTER — PATIENT MESSAGE (OUTPATIENT)
Dept: FAMILY MEDICINE CLINIC | Age: 65
End: 2025-06-11

## 2025-06-11 DIAGNOSIS — Z12.11 COLON CANCER SCREENING: Primary | ICD-10-CM

## 2025-06-11 NOTE — TELEPHONE ENCOUNTER
Call Central Scheduling to arrange for an appointment  [phone] call to make an appointment with dermatology    Follow-up with his doctor in 1 week.    No swimming or getting the ear wet until he follows up.    Get reevaluated for any new or worsening symptoms.   Med pending

## 2025-06-12 RX ORDER — OXYBUTYNIN CHLORIDE 10 MG/1
10 TABLET, EXTENDED RELEASE ORAL DAILY
Qty: 30 TABLET | Refills: 2 | Status: SHIPPED | OUTPATIENT
Start: 2025-06-12

## 2025-07-10 ENCOUNTER — OFFICE VISIT (OUTPATIENT)
Dept: SURGERY | Age: 65
End: 2025-07-10
Payer: MEDICARE

## 2025-07-10 VITALS
OXYGEN SATURATION: 95 % | BODY MASS INDEX: 17.75 KG/M2 | HEIGHT: 64 IN | HEART RATE: 99 BPM | SYSTOLIC BLOOD PRESSURE: 134 MMHG | TEMPERATURE: 97.9 F | WEIGHT: 104 LBS | DIASTOLIC BLOOD PRESSURE: 86 MMHG | RESPIRATION RATE: 18 BRPM

## 2025-07-10 DIAGNOSIS — K59.00 CONSTIPATION, UNSPECIFIED CONSTIPATION TYPE: Primary | ICD-10-CM

## 2025-07-10 DIAGNOSIS — Z86.0100 HISTORY OF COLON POLYPS: ICD-10-CM

## 2025-07-10 PROCEDURE — 1090F PRES/ABSN URINE INCON ASSESS: CPT | Performed by: SURGERY

## 2025-07-10 PROCEDURE — G8419 CALC BMI OUT NRM PARAM NOF/U: HCPCS | Performed by: SURGERY

## 2025-07-10 PROCEDURE — 3075F SYST BP GE 130 - 139MM HG: CPT | Performed by: SURGERY

## 2025-07-10 PROCEDURE — 99203 OFFICE O/P NEW LOW 30 MIN: CPT | Performed by: SURGERY

## 2025-07-10 PROCEDURE — G8427 DOCREV CUR MEDS BY ELIG CLIN: HCPCS | Performed by: SURGERY

## 2025-07-10 PROCEDURE — 1123F ACP DISCUSS/DSCN MKR DOCD: CPT | Performed by: SURGERY

## 2025-07-10 PROCEDURE — 3079F DIAST BP 80-89 MM HG: CPT | Performed by: SURGERY

## 2025-07-10 PROCEDURE — 4004F PT TOBACCO SCREEN RCVD TLK: CPT | Performed by: SURGERY

## 2025-07-10 PROCEDURE — G8400 PT W/DXA NO RESULTS DOC: HCPCS | Performed by: SURGERY

## 2025-07-10 PROCEDURE — 3017F COLORECTAL CA SCREEN DOC REV: CPT | Performed by: SURGERY

## 2025-07-10 RX ORDER — SODIUM CHLORIDE 9 MG/ML
INJECTION, SOLUTION INTRAVENOUS CONTINUOUS
OUTPATIENT
Start: 2025-07-10

## 2025-07-10 RX ORDER — ONDANSETRON 4 MG/1
4 TABLET, FILM COATED ORAL DAILY PRN
Qty: 30 TABLET | Refills: 0 | Status: SHIPPED | OUTPATIENT
Start: 2025-07-10

## 2025-07-10 NOTE — PROGRESS NOTES
General Surgery History and Physical    Patient's Name/Date of Birth: Kenji Gan / 1960    Date: 7/10/2025    PCP: Mike Hsu MD    Referring Physician:   Mike Hsu,*  [phone]    CHIEF COMPLAINT:    Chief Complaint   Patient presents with    Colonoscopy     Colon cancer screening REF-COLTONMAN    Pt states that she goes from constipation to diarrhea but no blood in the stool, last colonoscopy was 10 years ago had polyps.          HISTORY OF PRESENT ILLNESS:    Kenji Gan is an 65 y.o. female who presents for a colonoscopy. The patient said she has constipation off and on. She doesn't take anything regularly. She thinks it is diet related. She said she doesn't have a great appetite and this has been going on for years. No nausea, vomiting, diarrhea. No changes in stool caliber. No bloody or black stools. No abdominal pain. No unintentional weight loss. No family history of colon cancer. The patient has a known history of: colon polyps. The patient has had a colonoscopy before - ten years ago and I removed polyps.    The last time she attempted a colonoscopy in 2019 and she vomited the prep. She said it didn't seem to work for her either. 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     Past Medical History:   Past Medical History:   Diagnosis Date    Bladder spasm     COPD (chronic obstructive pulmonary disease) (HCC)     Diarrhea     GERD (gastroesophageal reflux disease)     Hypertension     Mass of lower lobe of right lung     Mass of upper lobe of right lung         Past Surgical History:   Past Surgical History:   Procedure Laterality Date    APPENDECTOMY  01/01/1995    BRONCHOSCOPY N/A 02/22/2024    BRONCHOSCOPY ENDOBRONCHIAL ULTRASOUND performed by Tristan Caruso MD at [facility] [place] ENDOSCOPY    BRONCHOSCOPY N/A 02/22/2024    BRONCHOSCOPY W/EBUS FNA ROBOTIC/CINE performed by Tristan Caruso MD at [facility] [place] ENDOSCOPY    BRONCHOSCOPY  02/22/2024

## 2025-08-19 RX ORDER — ONDANSETRON 4 MG/1
4 TABLET, FILM COATED ORAL DAILY PRN
Qty: 5 TABLET | Refills: 0 | Status: SHIPPED | OUTPATIENT
Start: 2025-08-19

## 2025-08-22 ENCOUNTER — OFFICE VISIT (OUTPATIENT)
Dept: FAMILY MEDICINE CLINIC | Age: 65
End: 2025-08-22
Payer: MEDICARE

## 2025-08-22 VITALS
SYSTOLIC BLOOD PRESSURE: 138 MMHG | DIASTOLIC BLOOD PRESSURE: 76 MMHG | BODY MASS INDEX: 18.78 KG/M2 | TEMPERATURE: 98.5 F | OXYGEN SATURATION: 99 % | HEIGHT: 64 IN | WEIGHT: 110 LBS | HEART RATE: 80 BPM

## 2025-08-22 DIAGNOSIS — R13.19 ESOPHAGEAL DYSPHAGIA: ICD-10-CM

## 2025-08-22 DIAGNOSIS — F10.90 ALCOHOL USE DISORDER: ICD-10-CM

## 2025-08-22 DIAGNOSIS — J44.9 CHRONIC OBSTRUCTIVE PULMONARY DISEASE, UNSPECIFIED COPD TYPE (HCC): ICD-10-CM

## 2025-08-22 DIAGNOSIS — I10 ESSENTIAL HYPERTENSION: Primary | ICD-10-CM

## 2025-08-22 PROCEDURE — 3017F COLORECTAL CA SCREEN DOC REV: CPT | Performed by: FAMILY MEDICINE

## 2025-08-22 PROCEDURE — 3075F SYST BP GE 130 - 139MM HG: CPT | Performed by: FAMILY MEDICINE

## 2025-08-22 PROCEDURE — 3023F SPIROM DOC REV: CPT | Performed by: FAMILY MEDICINE

## 2025-08-22 PROCEDURE — G8420 CALC BMI NORM PARAMETERS: HCPCS | Performed by: FAMILY MEDICINE

## 2025-08-22 PROCEDURE — 99214 OFFICE O/P EST MOD 30 MIN: CPT | Performed by: FAMILY MEDICINE

## 2025-08-22 PROCEDURE — 3078F DIAST BP <80 MM HG: CPT | Performed by: FAMILY MEDICINE

## 2025-08-22 PROCEDURE — G8427 DOCREV CUR MEDS BY ELIG CLIN: HCPCS | Performed by: FAMILY MEDICINE

## 2025-08-22 PROCEDURE — 1090F PRES/ABSN URINE INCON ASSESS: CPT | Performed by: FAMILY MEDICINE

## 2025-08-22 PROCEDURE — G8400 PT W/DXA NO RESULTS DOC: HCPCS | Performed by: FAMILY MEDICINE

## 2025-08-22 PROCEDURE — 1123F ACP DISCUSS/DSCN MKR DOCD: CPT | Performed by: FAMILY MEDICINE

## 2025-08-22 PROCEDURE — 4004F PT TOBACCO SCREEN RCVD TLK: CPT | Performed by: FAMILY MEDICINE

## 2025-08-22 RX ORDER — ALBUTEROL SULFATE 90 UG/1
AEROSOL, METERED RESPIRATORY (INHALATION)
Qty: 18 G | Refills: 10 | Status: SHIPPED | OUTPATIENT
Start: 2025-08-22

## (undated) DEVICE — SPONGE GZ W4XL4IN RAYON POLY FILL CVR W/ NONWOVEN FAB

## (undated) DEVICE — INTENDED FOR TISSUE SEPARATION, AND OTHER PROCEDURES THAT REQUIRE A SHARP SURGICAL BLADE TO PUNCTURE OR CUT.: Brand: BARD-PARKER ® STAINLESS STEEL BLADES

## (undated) DEVICE — SINGLE USE BIOPSY VALVE MAJ-210: Brand: SINGLE USE BIOPSY VALVE (STERILE)

## (undated) DEVICE — TOWEL,OR,DSP,ST,BLUE,STD,6/PK,12PK/CS: Brand: MEDLINE

## (undated) DEVICE — SOLUTION IV IRRIG POUR BRL 0.9% SODIUM CHL 2F7124

## (undated) DEVICE — KIT PROCEDURE BRONCHOSCOPY GALAXY (MUST BE PURCHASED IN INCREMENTS OF 4 EA)

## (undated) DEVICE — SINGLE USE SUCTION VALVE MAJ-209: Brand: SINGLE USE SUCTION VALVE (STERILE)

## (undated) DEVICE — ADHESIVE SKIN CLSR 0.7ML TOP DERMBND ADV

## (undated) DEVICE — ADAPTER TBNG DIA15MM SWVL FBROPT BRONCHSCP TERM 2 AXIS PEEP

## (undated) DEVICE — GOWN,SIRUS,FABRNF,L,20/CS: Brand: MEDLINE

## (undated) DEVICE — GLOVE SURG SZ 6 L12IN FNGR THK94MIL TRNSLUC YEL LTX HYDRGEL

## (undated) DEVICE — BLOCK BITE 60FR RUBBER ADLT DENTAL

## (undated) DEVICE — CONTAINER SPEC 60ML PH 7NEUTRAL BUFF FRMLN RDY TO USE

## (undated) DEVICE — KIT,ANTI FOG,W/SPONGE & FLUID,SOFT PACK: Brand: MEDLINE

## (undated) DEVICE — CLIP INT M L POLYMER LOK LIG HEM O LOK

## (undated) DEVICE — PERMANENT CAUTERY HOOK: Brand: ENDOWRIST

## (undated) DEVICE — CANNULA SEAL

## (undated) DEVICE — INSUFFLATION NEEDLE TO ESTABLISH PNEUMOPERITONEUM.: Brand: INSUFFLATION NEEDLE

## (undated) DEVICE — PACK PROCEDURE SURG GEN CUST

## (undated) DEVICE — GOWN,SIRUS,FABRNF,XL,20/CS: Brand: MEDLINE

## (undated) DEVICE — SCISSORS SURG DIA8MM MPLR CRV ENDOWRIST

## (undated) DEVICE — DRAPE,LAP,CHOLE,W/TROUGHS,STERILE: Brand: MEDLINE

## (undated) DEVICE — INSTRUMENT CLAMP TOWEL LARGE REUSABLE

## (undated) DEVICE — ARM DRAPE

## (undated) DEVICE — BRUSH CYTO L140CM DIA1.5MM WRK CHN 2MM BRIST SHTH RNG HNDL

## (undated) DEVICE — ANCHOR TISSUE RETRIEVAL SYSTEM, BAG SIZE 125 ML, PORT SIZE 8 MM: Brand: ANCHOR TISSUE RETRIEVAL SYSTEM

## (undated) DEVICE — SURGICAL PROCEDURE PACK BRONCH

## (undated) DEVICE — FORCEPS L110MM DIA1.7MM PREMARKED REINF SHFT

## (undated) DEVICE — APPLICATOR PREP 26ML 0.7% IOD POVACRYLEX 74% ISO ALC ST

## (undated) DEVICE — DOUBLE BASIN SET: Brand: MEDLINE INDUSTRIES, INC.

## (undated) DEVICE — GRADUATE TRIANG MEASURE 1000ML BLK PRNT

## (undated) DEVICE — WARMER SCP 2 ANTIFOG LAP DISP

## (undated) DEVICE — NEEDLE BRONCHSCP 21GA HNDL SHTH NDL STYL PERIVIEW FLX

## (undated) DEVICE — MEDIUM-LARGE CLIP APPLIER: Brand: ENDOWRIST

## (undated) DEVICE — BLADELESS OBTURATOR: Brand: WECK VISTA

## (undated) DEVICE — COLUMN DRAPE

## (undated) DEVICE — CADIERE FORCEPS: Brand: ENDOWRIST

## (undated) DEVICE — ELECTRODE PT RET AD L9FT HI MOIST COND ADH HYDRGEL CORDED

## (undated) DEVICE — TIP COVER ACCESSORY

## (undated) DEVICE — INSUFFLATION TUBING SET WITH FILTER, FUNNEL CONNECTOR AND LUER LOCK: Brand: JOSNOE MEDICAL INC

## (undated) DEVICE — BRONCHOSCOPY PACK: Brand: MEDLINE INDUSTRIES, INC.